# Patient Record
Sex: FEMALE | Race: WHITE | NOT HISPANIC OR LATINO | Employment: UNEMPLOYED | URBAN - NONMETROPOLITAN AREA
[De-identification: names, ages, dates, MRNs, and addresses within clinical notes are randomized per-mention and may not be internally consistent; named-entity substitution may affect disease eponyms.]

---

## 2017-01-17 ENCOUNTER — OFFICE VISIT (OUTPATIENT)
Dept: ENDOCRINOLOGY | Facility: CLINIC | Age: 47
End: 2017-01-17

## 2017-01-17 VITALS
HEART RATE: 75 BPM | WEIGHT: 200.8 LBS | BODY MASS INDEX: 35.58 KG/M2 | HEIGHT: 63 IN | SYSTOLIC BLOOD PRESSURE: 158 MMHG | DIASTOLIC BLOOD PRESSURE: 60 MMHG

## 2017-01-17 DIAGNOSIS — E10.69 MIXED DIABETIC HYPERLIPIDEMIA ASSOCIATED WITH TYPE 1 DIABETES MELLITUS (HCC): ICD-10-CM

## 2017-01-17 DIAGNOSIS — I15.2 HYPERTENSION ASSOCIATED WITH DIABETES (HCC): ICD-10-CM

## 2017-01-17 DIAGNOSIS — E10.21 DIABETIC NEPHROPATHY ASSOCIATED WITH TYPE 1 DIABETES MELLITUS (HCC): ICD-10-CM

## 2017-01-17 DIAGNOSIS — E78.2 MIXED DIABETIC HYPERLIPIDEMIA ASSOCIATED WITH TYPE 1 DIABETES MELLITUS (HCC): ICD-10-CM

## 2017-01-17 DIAGNOSIS — E11.59 HYPERTENSION ASSOCIATED WITH DIABETES (HCC): ICD-10-CM

## 2017-01-17 DIAGNOSIS — E10.649 TYPE 1 DIABETES MELLITUS WITH HYPOGLYCEMIA UNAWARENESS (HCC): Primary | ICD-10-CM

## 2017-01-17 PROBLEM — E10.3419: Status: ACTIVE | Noted: 2017-01-17

## 2017-01-17 LAB — GLUCOSE BLDC GLUCOMTR-MCNC: 160 MG/DL (ref 70–130)

## 2017-01-17 PROCEDURE — 99214 OFFICE O/P EST MOD 30 MIN: CPT | Performed by: INTERNAL MEDICINE

## 2017-01-17 PROCEDURE — 82962 GLUCOSE BLOOD TEST: CPT | Performed by: INTERNAL MEDICINE

## 2017-01-17 NOTE — MR AVS SNAPSHOT
"                        Amanda Gunderson Vito   1/17/2017 9:45 AM   Office Visit    Dept Phone:  [phone]   Encounter #:  [id number]    Provider:  Satinder Ledezma MD   Department:  [facility] ENDOCRINOLOGY                Your Full Care Plan              Today's Medication Changes          These changes are accurate as of: 1/17/17 11:14 AM.  If you have any questions, ask your nurse or doctor.               New Medication(s)Ordered:     Insulin Glargine 300 UNIT/ML solution pen-injector   Commonly known as:  TOUJEO SOLOSTAR   Inject 30 Units under the skin Daily.   Replaces:  LANTUS 100 UNIT/ML injection         Medication(s)that have changed:     lisinopril 10 MG tablet   Commonly known as:  PRINIVIL,ZESTRIL   Take 10 mg by mouth daily.   What changed:  Another medication with the same name was removed. Continue taking this medication, and follow the directions you see here.         Stop taking medication(s)listed here:     LANTUS 100 UNIT/ML injection   Generic drug:  insulin glargine   Replaced by:  Insulin Glargine 300 UNIT/ML solution pen-injector           NOVOLOG 100 UNIT/ML injection   Generic drug:  insulin aspart                Where to Get Your Medications      These medications were sent to [facility] [address] [place] [address] [employer] OUT[facility] - [phone] [facility] [phone] [facility] [address]     Phone:  [phone]     Insulin Glargine 300 UNIT/ML solution pen-injector                  Your Updated Medication List          This list is accurate as of: 1/17/17 11:14 AM.  Always use your most recent med list.                ACCU-CHEK CELSO PLUS test strip   Generic drug:  glucose blood       ACCU-CHEK MULTICLIX LANCET DEV kit   Provide lancing device and 5 lancets per day use as needed       atorvastatin 40 MG tablet   Commonly known as:  LIPITOR       * B-D INSULIN SYRINGE 1CC/27G 27G X 1/2\" 1 ML misc   Generic drug:  Insulin Syringe-Needle " "U-100       * B-D INS SYRINGE 0.5CC/31GX5/16 31G X 5/16\" 0.5 ML misc   Generic drug:  Insulin Syringe-Needle U-100       * BD INSULIN SYRINGE ULTRAFINE 31G X 15/64\" 0.5 ML misc   Generic drug:  Insulin Syringe-Needle U-100       ALONSO BREEZE 2 SYSTEM W/DEVICE kit   Provide meter and strips to check 5 times daily       furosemide 40 MG tablet   Commonly known as:  LASIX       GLUCAGON EMERGENCY 1 MG injection   Generic drug:  glucagon       HUMALOG 100 UNIT/ML injection   Generic drug:  insulin lispro       hydrALAZINE 25 MG tablet   Commonly known as:  APRESOLINE       Insulin Glargine 300 UNIT/ML solution pen-injector   Commonly known as:  TOUJEO SOLOSTAR   Inject 30 Units under the skin Daily.       lisinopril 10 MG tablet   Commonly known as:  PRINIVIL,ZESTRIL       meloxicam 7.5 MG tablet   Commonly known as:  MOBIC       POLYTRIM 37111-3.1 UNIT/ML-% ophthalmic solution   Generic drug:  trimethoprim-polymyxin b       * Notice:  This list has 3 medication(s) that are the same as other medications prescribed for you. Read the directions carefully, and ask your doctor or other care provider to review them with you.            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    -  Primary ICD-10-CM: E10.649  ICD-9-CM: 250.81     Hypertension associated with diabetes     ICD-10-CM: E11.59, I10  ICD-9-CM: 250.80, 401.9     Mixed diabetic hyperlipidemia associated with type 1 diabetes mellitus     ICD-10-CM: E10.69, E78.2  ICD-9-CM: 250.81, 272.2     Diabetic nephropathy associated with type 1 diabetes mellitus     ICD-10-CM: E10.21  ICD-9-CM: 250.41, 583.81       Instructions     None    Patient Instructions History      Upcoming " "Appointments     Visit Type Date Time Department    FOLLOW UP 1/17/2017  9:45 AM [facility] ENDOCRINOLOGY [facility]    FOLLOW UP 5/31/2017  9:00 AM [facility]CRINOLOGY [facility]      [facility] Sign[facility]     Our records indicate that you have an active [facility] [email or web address] account.    You can view your After Visit Summary by going to [employer] and logging in with your [email or web address] username and password.  If you don't have a [email or web address] username and password but a parent or guardian has access to your record, the parent or guardian should login with their own [email or web address] username and password and access your record to view the After Visit Summary.    If you have questions, you can email [email or web address] or call [phone] to talk to our [email or web address] staff.  Remember, [email or web address] is NOT to be used for urgent needs.  For medical emergencies, dial 911.               Other Info from Your Visit           Your Appointments     May 31, 2017  9:00 AM CDT   Follow Up with Satinder Ledezma MD   [facility] (--)    [address]  [facility] [place] [address] [place], [place] [id number] [phone]           Arrive 15 minutes prior to appointment.              Allergies     Altace [Ramipril]        Reason for Visit     Diabetes           Vital Signs     Blood Pressure Pulse Height Weight Body Mass Index Smoking Status    158/60 (BP Location: Right arm, Patient Position: Sitting, Cuff Size: Large Adult) 75 63\" (160 cm) 200 lb 12.8 oz (91.1 kg) 35.57 kg/m2 Never Smoker      Problems and Diagnoses Noted     Diabetes with kidney complications    High blood pressure associated with diabetes    Mixed diabetic hyperlipidemia associated with type 1 diabetes mellitus    Severe nonproliferative diabetic retinopathy with macular edema associated with type 1 diabetes mellitus    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

## 2017-01-17 NOTE — PROGRESS NOTES
Amanda Padron is a 46 y.o. female who presents for  evaluation of   Chief Complaint   Patient presents with   • Diabetes         Primary Care / Referring Provider  APOLINAR Boyd    History of Present Illness  Duration/Timing:  Diabetes mellitus type 1, Age at onset of diabetes: 11 years, Onset of symptoms sudden  Timing - constant  Qualtiy - uncontrolled  Severiy - High since there is associated DR.    Severity (Complications/Hospitalizations)  Secondary Macrovascular Complications:  No CAD, No CVA, No PAD  Secondary Microvascular Complications:  Date of last Microalbumin Assessment 06/08/2016, No proteinuria, Diabetic Retinopathy, No Diabetic Neuropathy  No microalbuminuria - Jan 2014    Context  Diabetes Regimen:  Insulin,   Lab Results   Component Value Date    HGBA1C 7.9 (H) 01/09/2017       Blood Glucose Readings  both hypo and hyperglycemia   Diet  variable carb intake  Exercise:  Does not exercise    Associated Signs/Symptoms  Hyperglycemic Symptoms:  No polyuria, No polydipsia, No polyphagia, Blurred vision  Hypoglycemic Episodes:  Documented symptomatic hypoglycemia, Hypoglycemic unawareness, Seizures and syncope related to hypoglycemia      Past Medical History   Diagnosis Date   • Acute conjunctivitis    • Brittle diabetes mellitus      not controlled due to hypoglycemia      • Diabetic retinopathy    • Dyslipidemia    • Hypertensive disorder    • Vitamin D deficiency      Family History   Problem Relation Age of Onset   • Coronary artery disease Other    • Diabetes Other    • Hypertension Other      Social History   Substance Use Topics   • Smoking status: Never Smoker   • Smokeless tobacco: Not on file   • Alcohol use Not on file         Current Outpatient Prescriptions:   •  atorvastatin (LIPITOR) 40 MG tablet, Take 60 mg by mouth daily., Disp: , Rfl:   •  Blood Glucose Monitoring Suppl (ALONSO BREEZE 2 SYSTEM) W/DEVICE kit, Provide meter and strips to check 5 times daily, Disp: 150  "each, Rfl: 11  •  glucagon (GLUCAGON EMERGENCY) 1 MG injection, Inject  under the skin. Use as directed., Disp: , Rfl:   •  glucose blood (ACCU-CHEK CELSO PLUS) test strip, 1 each by Other route 4 (four) times a day. Use as instructed, Disp: , Rfl:   •  hydrALAZINE (APRESOLINE) 25 MG tablet, Take 25 mg by mouth 3 (three) times a day., Disp: , Rfl:   •  insulin glargine (LANTUS) 100 UNIT/ML injection, Inject 30 Units under the skin every night., Disp: , Rfl:   •  insulin lispro (HUMALOG) 100 UNIT/ML injection, Inject 20 Units under the skin 3 (three) times a day before meals., Disp: , Rfl:   •  Insulin Syringe-Needle U-100 (B-D INS SYRINGE 0.5CC/31GX5/16) 31G X 5/16\" 0.5 ML misc, 5 (five) times a day., Disp: , Rfl:   •  Insulin Syringe-Needle U-100 (B-D INSULIN SYRINGE 1CC/27G) 27G X 1/2\" 1 ML misc, 4 (four) times a day., Disp: , Rfl:   •  Insulin Syringe-Needle U-100 (BD INSULIN SYRINGE ULTRAFINE) 31G X 15/64\" 0.5 ML misc, 4 (four) times a day., Disp: , Rfl:   •  Lancets Misc. (ACCU-CHEK MULTICLIX LANCET DEV) kit, Provide lancing device and 5 lancets per day use as needed, Disp: 150 each, Rfl: 11  •  lisinopril (PRINIVIL,ZESTRIL) 20 MG tablet, Take 20 mg by mouth daily., Disp: , Rfl:   •  meloxicam (MOBIC) 7.5 MG tablet, Take 7.5 mg by mouth daily. one tablet by mouth once daily for pain take with food., Disp: , Rfl:   •  trimethoprim-polymyxin b (POLYTRIM) 32381-4.1 UNIT/ML-% ophthalmic solution, 1-2 drops 4 (four) times a day. one or two gtts to the affected eye QID for 5 to 7 days., Disp: , Rfl:   •  furosemide (LASIX) 40 MG tablet, Take 20 mg by mouth Every Other Day., Disp: , Rfl:   •  insulin aspart (NOVOLOG) 100 UNIT/ML injection, Inject 15-20 Units under the skin 3 (three) times a day before meals. Plus take with snacks., Disp: , Rfl:   •  lisinopril (PRINIVIL,ZESTRIL) 10 MG tablet, Take 10 mg by mouth daily., Disp: , Rfl:     Review of Systems    Review of Systems   Constitutional: Negative for activity " "change, appetite change, chills, diaphoresis, fatigue, fever and unexpected weight change.   HENT: Negative for congestion, drooling, ear discharge, ear pain, facial swelling, mouth sores, nosebleeds, postnasal drip, sinus pressure, sneezing, sore throat, tinnitus, trouble swallowing and voice change.    Eyes: Negative.  Negative for photophobia, pain, discharge, redness and itching.   Respiratory: Negative.  Negative for apnea, cough, choking, chest tightness, shortness of breath, wheezing and stridor.    Cardiovascular: Negative.  Negative for chest pain, palpitations and leg swelling.   Gastrointestinal: Negative.  Negative for abdominal distention, abdominal pain, constipation, diarrhea, nausea and vomiting.   Endocrine: Negative.  Negative for cold intolerance, heat intolerance, polydipsia, polyphagia and polyuria.   Genitourinary: Negative for difficulty urinating, dysuria, flank pain and frequency.   Musculoskeletal: Negative for arthralgias, back pain, gait problem, joint swelling, myalgias, neck pain and neck stiffness.   Skin: Negative for color change, pallor, rash and wound.   Allergic/Immunologic: Negative for environmental allergies, food allergies and immunocompromised state.   Neurological: Negative for dizziness, tremors, seizures, syncope, facial asymmetry, speech difficulty, weakness, light-headedness, numbness and headaches.   Hematological: Negative for adenopathy. Does not bruise/bleed easily.   Psychiatric/Behavioral: Negative for agitation, behavioral problems, confusion, decreased concentration, dysphoric mood, hallucinations, self-injury, sleep disturbance and suicidal ideas. The patient is not nervous/anxious and is not hyperactive.         Objective:     Visit Vitals   • /60 (BP Location: Right arm, Patient Position: Sitting, Cuff Size: Large Adult)   • Pulse 75   • Ht 63\" (160 cm)   • Wt 200 lb 12.8 oz (91.1 kg)   • BMI 35.57 kg/m2       Physical Exam   Constitutional: She is " oriented to person, place, and time. She appears well-developed.   HENT:   Head: Normocephalic.   Right Ear: External ear normal.   Left Ear: External ear normal.   Nose: Nose normal.   Eyes: Conjunctivae and EOM are normal. No scleral icterus.   Neck: Normal range of motion. Neck supple. No tracheal deviation present. No thyromegaly present.   Cardiovascular: Normal rate, regular rhythm, normal heart sounds and intact distal pulses.  Exam reveals no gallop and no friction rub.    No murmur heard.  Pulmonary/Chest: Effort normal and breath sounds normal. No stridor. No respiratory distress. She has no wheezes. She has no rales. She exhibits no tenderness.   Abdominal: Soft. Bowel sounds are normal. She exhibits no distension and no mass. There is no tenderness. There is no rebound and no guarding.   Musculoskeletal: Normal range of motion. She exhibits no tenderness or deformity.   Lymphadenopathy:     She has no cervical adenopathy.   Neurological: She is alert and oriented to person, place, and time. She displays normal reflexes. She exhibits normal muscle tone. Coordination normal.   Skin: No rash noted. No erythema. No pallor.   Psychiatric: She has a normal mood and affect. Her behavior is normal. Judgment and thought content normal.       Lab Review    Results for orders placed or performed in visit on 01/17/17   POC Glucose Fingerstick   Result Value Ref Range    Glucose 160 (A) 70 - 130 mg/dL           Assessment/Plan       ICD-10-CM ICD-9-CM   1. Uncontrolled type 1 diabetes mellitus with stage 3 chronic kidney disease E10.22 250.43    N18.3 585.3    E10.65        Glycemic Mgmt    Lab Results   Component Value Date    HGBA1C 7.9 (H) 01/09/2017    HGBA1C 9.0 (H) 09/29/2016    HGBA1C 9.3 (H) 06/13/2016     Lab Results   Component Value Date    LDLCALC 60 01/09/2017    CREATININE 1.4 (H) 01/09/2017         No carb counting    She gives insulin based on experience and doesn't like change so she is still using  syringe and vials    Lantus 25  -- I suggested to adjust based on night to a.m. excursion  +- adjusting by 1 to 2 units    Novolog- now Humalog,  15 with meals but she adjusts between 10 and 18  It seems that she does a carb ratio of 5 and I suggested to try it and self titration explained.  I sugggested sensitivity of 40    I did give her pens for her to try but preferred syringes    She agrees to toujeo, this will improve variability accounted by kinetic insulin variability       I suggested sensor, she will think about it.                                                                                                                                                                                                                                                                                                                                                                                                                                                                                                                                                       Lipid Management    No results found for: CHOL  Lab Results   Component Value Date    TRIG 93 01/09/2017    TRIG 90 08/11/2015    TRIG 65 04/21/2014     Lab Results   Component Value Date    HDL 53 (L) 01/09/2017    HDL 62 08/11/2015    HDL 60 04/21/2014     Lab Results   Component Value Date    LDLCALC 60 01/09/2017    LDLCALC 55 08/11/2015    LDLCALC 54 04/21/2014     Lipids at goal     continue     Lipitor 40 mg tablet, 1 1/2 tabs daily   Blood Pressure Management:  Target blood pressure less than 130/80 mm/hg, On Ace Inhibitor/ARB, Control of blood pressure    Vitals:    01/17/17 1031   BP: 158/60   Pulse: 75       Now managed by Moris      Microvascular Complication Monitoring:  Microalbuminuria, Date of last Microalbumin Assessment 06/08/2016, Diabetic Retinopathy, No Diabetic Neuropathy  following with Dr. Moore in [place] has     Now following w nephrology      On lasix 20 mg daily   On lisinopril 10 mg daily     not taking NSAIDS, bp well controlled, diabetes needs to improve     no neuropathy - has plantar fasciitis   Immunizations:  Patient prefers not to receive influenza immunization, Patient prefers not to receive pneumococcal immunization  Preventive Care:  Patient is not smoking  Weight Related:  Obesity, Counseled on nutrition, Counseled on physical activity  Bone Health  Vit D Def  can't tolerate Vit D3 but can tolerate vit D2    On 50 th u every weeky      Other Diabetes Related Aspects    Lab Results   Component Value Date    TSH 0.70 01/09/2017     Lab Results   Component Value Date    FTNCNMOH50 251 01/09/2017   neg celiac panel in  2016     I reviewed and summarized records from APOLINAR Boyd from 2016 and I reviewed / ordered labs.     Orders Placed This Encounter   Procedures   • POC Glucose Fingerstick         A copy of my note was sent to APOLINAR Boyd    Please see my above opinion and suggestions.

## 2017-04-06 ENCOUNTER — TRANSCRIBE ORDERS (OUTPATIENT)
Dept: LAB | Facility: HOSPITAL | Age: 47
End: 2017-04-06

## 2017-04-06 DIAGNOSIS — I10 ESSENTIAL (PRIMARY) HYPERTENSION: Primary | ICD-10-CM

## 2017-04-06 DIAGNOSIS — N17.9 ACUTE KIDNEY FAILURE, UNSPECIFIED (HCC): ICD-10-CM

## 2017-04-06 DIAGNOSIS — R80.9 PROTEINURIA: ICD-10-CM

## 2017-04-06 DIAGNOSIS — N18.30 CHRONIC RENAL DISEASE, STAGE III (HCC): ICD-10-CM

## 2017-04-06 DIAGNOSIS — E55.9 UNSPECIFIED VITAMIN D DEFICIENCY: ICD-10-CM

## 2017-04-28 ENCOUNTER — TELEPHONE (OUTPATIENT)
Dept: ENDOCRINOLOGY | Facility: CLINIC | Age: 47
End: 2017-04-28

## 2017-05-24 ENCOUNTER — TRANSCRIBE ORDERS (OUTPATIENT)
Dept: LAB | Facility: HOSPITAL | Age: 47
End: 2017-05-24

## 2017-05-25 ENCOUNTER — APPOINTMENT (OUTPATIENT)
Dept: LAB | Facility: HOSPITAL | Age: 47
End: 2017-05-25

## 2017-05-25 LAB
25(OH)D3 SERPL-MCNC: 46 NG/ML (ref 30–100)
ALBUMIN SERPL-MCNC: 4.7 G/DL (ref 3.4–4.8)
ALBUMIN UR-MCNC: 1.8 MG/L
ALBUMIN/GLOB SERPL: 1.5 G/DL (ref 1.1–1.8)
ALP SERPL-CCNC: 116 U/L (ref 38–126)
ALT SERPL W P-5'-P-CCNC: 52 U/L (ref 9–52)
ANION GAP SERPL CALCULATED.3IONS-SCNC: 14 MMOL/L (ref 5–15)
ARTICHOKE IGE QN: 60 MG/DL (ref 1–129)
AST SERPL-CCNC: 48 U/L (ref 14–36)
BASOPHILS # BLD AUTO: 0.01 10*3/MM3 (ref 0–0.2)
BASOPHILS NFR BLD AUTO: 0.2 % (ref 0–2)
BILIRUB SERPL-MCNC: 0.4 MG/DL (ref 0.2–1.3)
BUN BLD-MCNC: 19 MG/DL (ref 7–21)
BUN/CREAT SERPL: 16.5 (ref 7–25)
CALCIUM SPEC-SCNC: 9.2 MG/DL (ref 8.4–10.2)
CHLORIDE SERPL-SCNC: 100 MMOL/L (ref 95–110)
CHOLEST SERPL-MCNC: 140 MG/DL (ref 0–199)
CO2 SERPL-SCNC: 23 MMOL/L (ref 22–31)
CREAT BLD-MCNC: 1.15 MG/DL (ref 0.5–1)
CREAT UR-MCNC: 20.8 MG/DL
DEPRECATED RDW RBC AUTO: 46 FL (ref 36.4–46.3)
EOSINOPHIL # BLD AUTO: 0.22 10*3/MM3 (ref 0–0.7)
EOSINOPHIL NFR BLD AUTO: 3.8 % (ref 0–7)
ERYTHROCYTE [DISTWIDTH] IN BLOOD BY AUTOMATED COUNT: 14 % (ref 11.5–14.5)
GFR SERPL CREATININE-BSD FRML MDRD: 50 ML/MIN/1.73 (ref 58–135)
GLOBULIN UR ELPH-MCNC: 3.1 GM/DL (ref 2.3–3.5)
GLUCOSE BLD-MCNC: 215 MG/DL (ref 60–100)
HBA1C MFR BLD: 7.99 % (ref 4–5.6)
HCT VFR BLD AUTO: 35.6 % (ref 35–45)
HDLC SERPL-MCNC: 64 MG/DL (ref 60–200)
HGB BLD-MCNC: 11.5 G/DL (ref 12–15.5)
IMM GRANULOCYTES # BLD: 0.01 10*3/MM3 (ref 0–0.02)
IMM GRANULOCYTES NFR BLD: 0.2 % (ref 0–0.5)
LDLC/HDLC SERPL: 0.98 {RATIO} (ref 0–3.22)
LYMPHOCYTES # BLD AUTO: 1.86 10*3/MM3 (ref 0.6–4.2)
LYMPHOCYTES NFR BLD AUTO: 32.2 % (ref 10–50)
MCH RBC QN AUTO: 28.7 PG (ref 26.5–34)
MCHC RBC AUTO-ENTMCNC: 32.3 G/DL (ref 31.4–36)
MCV RBC AUTO: 88.8 FL (ref 80–98)
MICROALBUMIN/CREAT UR: 86.5 MG/G (ref 0–30)
MONOCYTES # BLD AUTO: 0.4 10*3/MM3 (ref 0–0.9)
MONOCYTES NFR BLD AUTO: 6.9 % (ref 0–12)
NEUTROPHILS # BLD AUTO: 3.28 10*3/MM3 (ref 2–8.6)
NEUTROPHILS NFR BLD AUTO: 56.7 % (ref 37–80)
PLATELET # BLD AUTO: 209 10*3/MM3 (ref 150–450)
PMV BLD AUTO: 13 FL (ref 8–12)
POTASSIUM BLD-SCNC: 4.3 MMOL/L (ref 3.5–5.1)
PROT SERPL-MCNC: 7.8 G/DL (ref 6.3–8.6)
RBC # BLD AUTO: 4.01 10*6/MM3 (ref 3.77–5.16)
SODIUM BLD-SCNC: 137 MMOL/L (ref 137–145)
TRIGL SERPL-MCNC: 66 MG/DL (ref 20–199)
TSH SERPL DL<=0.05 MIU/L-ACNC: 0.82 MIU/ML (ref 0.46–4.68)
VIT B12 BLD-MCNC: 906 PG/ML (ref 239–931)
WBC NRBC COR # BLD: 5.78 10*3/MM3 (ref 3.2–9.8)

## 2017-05-25 PROCEDURE — 85025 COMPLETE CBC W/AUTO DIFF WBC: CPT | Performed by: INTERNAL MEDICINE

## 2017-05-25 PROCEDURE — 82570 ASSAY OF URINE CREATININE: CPT | Performed by: INTERNAL MEDICINE

## 2017-05-25 PROCEDURE — 36415 COLL VENOUS BLD VENIPUNCTURE: CPT | Performed by: INTERNAL MEDICINE

## 2017-05-25 PROCEDURE — 82043 UR ALBUMIN QUANTITATIVE: CPT | Performed by: INTERNAL MEDICINE

## 2017-05-25 PROCEDURE — 80061 LIPID PANEL: CPT | Performed by: INTERNAL MEDICINE

## 2017-05-25 PROCEDURE — 80053 COMPREHEN METABOLIC PANEL: CPT | Performed by: INTERNAL MEDICINE

## 2017-05-25 PROCEDURE — 83036 HEMOGLOBIN GLYCOSYLATED A1C: CPT | Performed by: INTERNAL MEDICINE

## 2017-05-25 PROCEDURE — 82607 VITAMIN B-12: CPT | Performed by: INTERNAL MEDICINE

## 2017-05-25 PROCEDURE — 84156 ASSAY OF PROTEIN URINE: CPT | Performed by: INTERNAL MEDICINE

## 2017-05-25 PROCEDURE — 82306 VITAMIN D 25 HYDROXY: CPT | Performed by: INTERNAL MEDICINE

## 2017-05-25 PROCEDURE — 84443 ASSAY THYROID STIM HORMONE: CPT | Performed by: INTERNAL MEDICINE

## 2017-06-19 ENCOUNTER — TRANSCRIBE ORDERS (OUTPATIENT)
Dept: LAB | Facility: HOSPITAL | Age: 47
End: 2017-06-19

## 2017-06-19 DIAGNOSIS — R80.9 PROTEINURIA: ICD-10-CM

## 2017-06-19 DIAGNOSIS — I10 BENIGN HYPERTENSION: Primary | ICD-10-CM

## 2017-06-19 DIAGNOSIS — E55.9 UNSPECIFIED VITAMIN D DEFICIENCY: ICD-10-CM

## 2017-06-19 DIAGNOSIS — N18.30 CHRONIC KIDNEY DISEASE, STAGE III (MODERATE) (HCC): ICD-10-CM

## 2017-06-19 DIAGNOSIS — N17.9 ACUTE KIDNEY FAILURE, UNSPECIFIED (HCC): ICD-10-CM

## 2017-06-26 ENCOUNTER — OFFICE VISIT (OUTPATIENT)
Dept: ENDOCRINOLOGY | Facility: CLINIC | Age: 47
End: 2017-06-26

## 2017-06-26 VITALS
HEIGHT: 63 IN | WEIGHT: 194.3 LBS | DIASTOLIC BLOOD PRESSURE: 68 MMHG | SYSTOLIC BLOOD PRESSURE: 148 MMHG | BODY MASS INDEX: 34.43 KG/M2 | HEART RATE: 76 BPM

## 2017-06-26 DIAGNOSIS — E10.649 TYPE 1 DIABETES MELLITUS WITH HYPOGLYCEMIA UNAWARENESS (HCC): Primary | ICD-10-CM

## 2017-06-26 DIAGNOSIS — E78.2 MIXED DIABETIC HYPERLIPIDEMIA ASSOCIATED WITH TYPE 1 DIABETES MELLITUS (HCC): ICD-10-CM

## 2017-06-26 DIAGNOSIS — I15.2 HYPERTENSION ASSOCIATED WITH DIABETES (HCC): ICD-10-CM

## 2017-06-26 DIAGNOSIS — E10.3419: ICD-10-CM

## 2017-06-26 DIAGNOSIS — E10.69 MIXED DIABETIC HYPERLIPIDEMIA ASSOCIATED WITH TYPE 1 DIABETES MELLITUS (HCC): ICD-10-CM

## 2017-06-26 DIAGNOSIS — E11.59 HYPERTENSION ASSOCIATED WITH DIABETES (HCC): ICD-10-CM

## 2017-06-26 LAB — GLUCOSE BLDC GLUCOMTR-MCNC: 133 MG/DL (ref 70–130)

## 2017-06-26 PROCEDURE — 82962 GLUCOSE BLOOD TEST: CPT | Performed by: INTERNAL MEDICINE

## 2017-06-26 PROCEDURE — 99214 OFFICE O/P EST MOD 30 MIN: CPT | Performed by: INTERNAL MEDICINE

## 2017-06-26 NOTE — PROGRESS NOTES
Amanda Padron is a 46 y.o. female who presents for  evaluation of   Chief Complaint   Patient presents with   • Diabetes         Primary Care / Referring Provider  APOLINAR Boyd    History of Present Illness  Duration/Timing:  Diabetes mellitus type 1, Age at onset of diabetes: 11 years, Onset of symptoms sudden  Timing - constant  Qualtiy - uncontrolled, improved   Severiy - High since there is associated DR.    Severity (Complications/Hospitalizations)  Secondary Macrovascular Complications:  No CAD, No CVA, No PAD  Secondary Microvascular Complications:  Diabetic Nephropathy , Diabetic Retinopathy, No Diabetic Neuropathy      Context  Diabetes Regimen:  Insulin,   Lab Results   Component Value Date    HGBA1C 7.99 (H) 05/25/2017       Blood Glucose Readings  both hypo and hyperglycemia but minimized   Diet  variable carb intake  Exercise:  Does not exercise    Associated Signs/Symptoms  Hyperglycemic Symptoms:  No polyuria, No polydipsia, No polyphagia, Blurred vision  Hypoglycemic Episodes:  Documented symptomatic hypoglycemia, Hypoglycemic unawareness, Seizures and syncope related to hypoglycemia      Past Medical History:   Diagnosis Date   • Acute conjunctivitis    • 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      • Diabetic retinopathy    • Dyslipidemia    • Hypertensive disorder    • Vitamin D deficiency      Family History   Problem Relation Age of Onset   • Coronary artery disease Other    • Diabetes Other    • Hypertension Other      Social History   Substance Use Topics   • Smoking status: Never Smoker   • Smokeless tobacco: Never Used   • Alcohol use Not on file         Current Outpatient Prescriptions:   •  atorvastatin (LIPITOR) 40 MG tablet, Take 60 mg by mouth daily., Disp: , Rfl:   •  Blood Glucose Monitoring Suppl (Simulmedia BREEZE 2 SYSTEM) W/DEVICE kit, Provide meter and strips to check 5 times daily, Disp: 150 each, Rfl: 11  •  furosemide (LASIX) 40 MG  "tablet, Take 20 mg by mouth Every Other Day., Disp: , Rfl:   •  glucagon (GLUCAGON EMERGENCY) 1 MG injection, Inject  under the skin. Use as directed., Disp: , Rfl:   •  glucose blood (ACCU-CHEK CELSO PLUS) test strip, 1 each by Other route 4 (four) times a day. Use as instructed, Disp: , Rfl:   •  Insulin Glargine (TOUJEO SOLOSTAR) 300 UNIT/ML solution pen-injector, Inject 30 Units under the skin Daily., Disp: 1 pen, Rfl: 11  •  insulin lispro (HUMALOG) 100 UNIT/ML injection, Inject 20 Units under the skin 3 (three) times a day before meals., Disp: , Rfl:   •  Insulin Syringe-Needle U-100 (B-D INS SYRINGE 0.5CC/31GX5/16) 31G X 5/16\" 0.5 ML misc, 5 (five) times a day., Disp: , Rfl:   •  Insulin Syringe-Needle U-100 (B-D INSULIN SYRINGE 1CC/27G) 27G X 1/2\" 1 ML misc, 4 (four) times a day., Disp: , Rfl:   •  Insulin Syringe-Needle U-100 (BD INSULIN SYRINGE ULTRAFINE) 31G X 15/64\" 0.5 ML misc, 4 (four) times a day., Disp: , Rfl:   •  Lancets Misc. (ACCU-CHEK MULTICLIX LANCET DEV) kit, Provide lancing device and 5 lancets per day use as needed, Disp: 150 each, Rfl: 11  •  trimethoprim-polymyxin b (POLYTRIM) 60992-0.1 UNIT/ML-% ophthalmic solution, 1-2 drops 4 (four) times a day. one or two gtts to the affected eye QID for 5 to 7 days., Disp: , Rfl:   •  hydrALAZINE (APRESOLINE) 25 MG tablet, Take 25 mg by mouth 3 (three) times a day., Disp: , Rfl:   •  lisinopril (PRINIVIL,ZESTRIL) 10 MG tablet, Take 10 mg by mouth daily., Disp: , Rfl:   •  meloxicam (MOBIC) 7.5 MG tablet, Take 7.5 mg by mouth daily. one tablet by mouth once daily for pain take with food., Disp: , Rfl:     Review of Systems    Review of Systems   Constitutional: Negative for activity change, appetite change, chills, diaphoresis, fatigue, fever and unexpected weight change.   HENT: Negative for congestion, drooling, ear discharge, ear pain, facial swelling, mouth sores, nosebleeds, postnasal drip, sinus pressure, sneezing, sore throat, tinnitus, trouble " "swallowing and voice change.    Eyes: Negative.  Negative for photophobia, pain, discharge, redness and itching.   Respiratory: Negative.  Negative for apnea, cough, choking, chest tightness, shortness of breath, wheezing and stridor.    Cardiovascular: Negative.  Negative for chest pain, palpitations and leg swelling.   Gastrointestinal: Negative.  Negative for abdominal distention, abdominal pain, constipation, diarrhea, nausea and vomiting.   Endocrine: Negative.  Negative for cold intolerance, heat intolerance, polydipsia, polyphagia and polyuria.   Genitourinary: Negative for difficulty urinating, dysuria, flank pain and frequency.   Musculoskeletal: Negative for arthralgias, back pain, gait problem, joint swelling, myalgias, neck pain and neck stiffness.   Skin: Negative for color change, pallor, rash and wound.   Allergic/Immunologic: Negative for environmental allergies, food allergies and immunocompromised state.   Neurological: Negative for dizziness, tremors, seizures, syncope, facial asymmetry, speech difficulty, weakness, light-headedness, numbness and headaches.   Hematological: Negative for adenopathy. Does not bruise/bleed easily.   Psychiatric/Behavioral: Negative for agitation, behavioral problems, confusion, decreased concentration, dysphoric mood, hallucinations, self-injury, sleep disturbance and suicidal ideas. The patient is not nervous/anxious and is not hyperactive.         Objective:     /68 (BP Location: Right arm, Patient Position: Sitting, Cuff Size: Adult)  Pulse 76  Ht 63\" (160 cm)  Wt 194 lb 4.8 oz (88.1 kg)  BMI 34.42 kg/m2    Physical Exam   Constitutional: She is oriented to person, place, and time. She appears well-developed.   HENT:   Head: Normocephalic.   Right Ear: External ear normal.   Left Ear: External ear normal.   Nose: Nose normal.   Eyes: Conjunctivae and EOM are normal. No scleral icterus.   Neck: Normal range of motion. Neck supple. No tracheal deviation " present. No thyromegaly present.   Cardiovascular: Normal rate, regular rhythm, normal heart sounds and intact distal pulses.  Exam reveals no gallop and no friction rub.    No murmur heard.  Pulmonary/Chest: Effort normal and breath sounds normal. No stridor. No respiratory distress. She has no wheezes. She has no rales. She exhibits no tenderness.   Abdominal: Soft. Bowel sounds are normal. She exhibits no distension and no mass. There is no tenderness. There is no rebound and no guarding.   Musculoskeletal: Normal range of motion. She exhibits no tenderness or deformity.   Lymphadenopathy:     She has no cervical adenopathy.   Neurological: She is alert and oriented to person, place, and time. She displays normal reflexes. She exhibits normal muscle tone. Coordination normal.   Skin: No rash noted. No erythema. No pallor.   Psychiatric: She has a normal mood and affect. Her behavior is normal. Judgment and thought content normal.       Lab Review    Results for orders placed or performed in visit on 06/26/17   POC Glucose Fingerstick   Result Value Ref Range    Glucose 133 (A) 70 - 130 mg/dL           Assessment/Plan       ICD-10-CM ICD-9-CM   1. 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 Hypertension associated with diabetes E11.59 250.80    I10 401.9   3. Mixed diabetic hyperlipidemia associated with type 1 diabetes mellitus E10.69 250.81    E78.2 272.2   4. Severe nonproliferative diabetic retinopathy with macular edema associated with type 1 diabetes mellitus E10.3419 250.51     362.07     362.06       Glycemic Mgmt    Lab Results   Component Value Date    HGBA1C 7.99 (H) 05/25/2017    HGBA1C 7.9 (H) 01/09/2017    HGBA1C 9.0 (H) 09/29/2016     Lab Results   Component Value Date    MICROALBUR 1.8 05/25/2017    LDLCALC 60 01/09/2017    CREATININE 1.15 (H) 05/25/2017         No carb counting    She gives insulin based on experience and doesn't like change so she is still using  syringe and vials    Toujeo , 25 units at night     Novolog- now Humalog,  15 with meals but she adjusts between 10 and 18  It seems that she does a carb ratio of 5 and I suggested to try it and self titration explained.  I sugggested sensitivity of 40    I did give her pens for her to try but preferred syringes    I suggested sensor, she will think about it.                                                                                                                                                                                                                                                                                                                                                                                                                                                                                                                                                       Lipid Management    Lab Results   Component Value Date    CHOL 140 05/25/2017     Lab Results   Component Value Date    TRIG 66 05/25/2017    TRIG 93 01/09/2017    TRIG 90 08/11/2015     Lab Results   Component Value Date    HDL 64 05/25/2017    HDL 53 (L) 01/09/2017    HDL 62 08/11/2015     Lab Results   Component Value Date    LDLCALC 60 01/09/2017    LDLCALC 55 08/11/2015    LDLCALC 54 04/21/2014     Lab Results   Component Value Date    LDLDIRECT 60 05/25/2017         Lipids at goal     continue     Lipitor 40 mg tablet, 1 1/2 tabs daily   Blood Pressure Management:  Target blood pressure less than 130/80 mm/hg, On Ace Inhibitor/ARB, Control of blood pressure    Vitals:    06/26/17 1458   BP: 148/68   Pulse: 76       Now managed by Moris      Microvascular Complication Monitoring:   No Diabetic Neuropathy  following with Dr. Moore in [facility]     Now following w nephrology     On lasix 20 mg daily   On lisinopril 10 mg daily     not taking NSAIDS, bp well controlled, diabetes needs to improve     no neuropathy - has plantar  fasciitis   Immunizations:  Patient prefers not to receive influenza immunization, Patient prefers not to receive pneumococcal immunization  Preventive Care:  Patient is not smoking  Weight Related:  Obesity, Counseled on nutrition, Counseled on physical activity  Bone Health  Vit D Def  can't tolerate Vit D3 but can tolerate vit D2    On 50 th u every weeky      Other Diabetes Related Aspects    Lab Results   Component Value Date    TSH 0.820 05/25/2017     Lab Results   Component Value Date    YMLQWJHG96 906 05/25/2017   neg celiac panel in  2016     I reviewed and summarized records from APOLINAR Boyd from 2016 and I reviewed / ordered labs.     Orders Placed This Encounter   Procedures   • POC Glucose Fingerstick         A copy of my note was sent to APOLINAR Boyd    Please see my above opinion and suggestions.

## 2018-02-21 ENCOUNTER — TRANSCRIBE ORDERS (OUTPATIENT)
Dept: LAB | Facility: HOSPITAL | Age: 48
End: 2018-02-21

## 2018-02-21 DIAGNOSIS — E55.9 VITAMIN D DEFICIENCY: ICD-10-CM

## 2018-02-21 DIAGNOSIS — N18.30 CHRONIC RENAL DISEASE, STAGE III (HCC): ICD-10-CM

## 2018-02-21 DIAGNOSIS — R80.9 PROTEINURIA, UNSPECIFIED TYPE: ICD-10-CM

## 2018-02-21 DIAGNOSIS — I10 ESSENTIAL HYPERTENSION: Primary | ICD-10-CM

## 2018-02-21 DIAGNOSIS — N17.9 ACUTE RENAL FAILURE, UNSPECIFIED ACUTE RENAL FAILURE TYPE (HCC): ICD-10-CM

## 2018-02-23 ENCOUNTER — TRANSCRIBE ORDERS (OUTPATIENT)
Dept: LAB | Facility: HOSPITAL | Age: 48
End: 2018-02-23

## 2018-02-23 DIAGNOSIS — Z00.00 ANNUAL PHYSICAL EXAM: Primary | ICD-10-CM

## 2018-03-05 ENCOUNTER — TELEPHONE (OUTPATIENT)
Dept: ENDOCRINOLOGY | Facility: CLINIC | Age: 48
End: 2018-03-05

## 2018-03-09 ENCOUNTER — LAB (OUTPATIENT)
Dept: LAB | Facility: HOSPITAL | Age: 48
End: 2018-03-09

## 2018-03-09 DIAGNOSIS — Z00.00 ANNUAL PHYSICAL EXAM: ICD-10-CM

## 2018-03-09 DIAGNOSIS — R80.9 PROTEINURIA, UNSPECIFIED TYPE: ICD-10-CM

## 2018-03-09 DIAGNOSIS — N18.30 CHRONIC RENAL DISEASE, STAGE III (HCC): ICD-10-CM

## 2018-03-09 DIAGNOSIS — I10 ESSENTIAL HYPERTENSION: ICD-10-CM

## 2018-03-09 DIAGNOSIS — E55.9 VITAMIN D DEFICIENCY: ICD-10-CM

## 2018-03-09 DIAGNOSIS — N17.9 ACUTE RENAL FAILURE, UNSPECIFIED ACUTE RENAL FAILURE TYPE (HCC): ICD-10-CM

## 2018-03-09 LAB
25(OH)D3 SERPL-MCNC: 25.6 NG/ML (ref 30–100)
ALBUMIN SERPL-MCNC: 4.7 G/DL (ref 3.4–4.8)
ALBUMIN UR-MCNC: 1.9 MG/L
ALBUMIN/GLOB SERPL: 1.3 G/DL (ref 1.1–1.8)
ALP SERPL-CCNC: 137 U/L (ref 38–126)
ALT SERPL W P-5'-P-CCNC: 33 U/L (ref 9–52)
ANION GAP SERPL CALCULATED.3IONS-SCNC: 18 MMOL/L (ref 5–15)
ARTICHOKE IGE QN: 72 MG/DL (ref 1–129)
AST SERPL-CCNC: 35 U/L (ref 14–36)
BASOPHILS # BLD AUTO: 0.02 10*3/MM3 (ref 0–0.2)
BASOPHILS NFR BLD AUTO: 0.3 % (ref 0–2)
BILIRUB SERPL-MCNC: 0.3 MG/DL (ref 0.2–1.3)
BUN BLD-MCNC: 28 MG/DL (ref 7–21)
BUN/CREAT SERPL: 24.1 (ref 7–25)
CALCIUM SPEC-SCNC: 9.9 MG/DL (ref 8.4–10.2)
CHLORIDE SERPL-SCNC: 96 MMOL/L (ref 95–110)
CHOLEST SERPL-MCNC: 171 MG/DL (ref 0–199)
CO2 SERPL-SCNC: 24 MMOL/L (ref 22–31)
CREAT BLD-MCNC: 1.16 MG/DL (ref 0.5–1)
CREAT UR-MCNC: 19 MG/DL
CREAT UR-MCNC: 19 MG/DL
DEPRECATED RDW RBC AUTO: 44.4 FL (ref 36.4–46.3)
EOSINOPHIL # BLD AUTO: 0.28 10*3/MM3 (ref 0–0.7)
EOSINOPHIL NFR BLD AUTO: 4.1 % (ref 0–7)
ERYTHROCYTE [DISTWIDTH] IN BLOOD BY AUTOMATED COUNT: 13.5 % (ref 11.5–14.5)
GFR SERPL CREATININE-BSD FRML MDRD: 50 ML/MIN/1.73 (ref 60–135)
GLOBULIN UR ELPH-MCNC: 3.6 GM/DL (ref 2.3–3.5)
GLUCOSE BLD-MCNC: 273 MG/DL (ref 60–100)
HBA1C MFR BLD: 7.9 % (ref 4–5.6)
HCT VFR BLD AUTO: 38.9 % (ref 35–45)
HDLC SERPL-MCNC: 59 MG/DL (ref 60–200)
HGB BLD-MCNC: 12.9 G/DL (ref 12–15.5)
IMM GRANULOCYTES # BLD: 0.01 10*3/MM3 (ref 0–0.02)
IMM GRANULOCYTES NFR BLD: 0.1 % (ref 0–0.5)
LDLC/HDLC SERPL: 1.52 {RATIO} (ref 0–3.22)
LYMPHOCYTES # BLD AUTO: 1.82 10*3/MM3 (ref 0.6–4.2)
LYMPHOCYTES NFR BLD AUTO: 26.5 % (ref 10–50)
MCH RBC QN AUTO: 29.4 PG (ref 26.5–34)
MCHC RBC AUTO-ENTMCNC: 33.2 G/DL (ref 31.4–36)
MCV RBC AUTO: 88.6 FL (ref 80–98)
MICROALBUMIN/CREAT UR: 100 MG/G (ref 0–30)
MONOCYTES # BLD AUTO: 0.41 10*3/MM3 (ref 0–0.9)
MONOCYTES NFR BLD AUTO: 6 % (ref 0–12)
NEUTROPHILS # BLD AUTO: 4.32 10*3/MM3 (ref 2–8.6)
NEUTROPHILS NFR BLD AUTO: 63 % (ref 37–80)
NRBC BLD MANUAL-RTO: 0 /100 WBC (ref 0–0)
PHOSPHATE SERPL-MCNC: 4.3 MG/DL (ref 2.4–4.4)
PLATELET # BLD AUTO: 239 10*3/MM3 (ref 150–450)
PMV BLD AUTO: 12.2 FL (ref 8–12)
POTASSIUM BLD-SCNC: 4.1 MMOL/L (ref 3.5–5.1)
PROT SERPL-MCNC: 8.3 G/DL (ref 6.3–8.6)
PROT UR-MCNC: 15.1 MG/DL
PROT/CREAT UR: 794.7 MG/G CREA (ref 0–200)
PTH-INTACT SERPL-MCNC: 78.6 PG/ML (ref 10–65)
RBC # BLD AUTO: 4.39 10*6/MM3 (ref 3.77–5.16)
SODIUM BLD-SCNC: 138 MMOL/L (ref 137–145)
TRIGL SERPL-MCNC: 112 MG/DL (ref 20–199)
TSH SERPL DL<=0.05 MIU/L-ACNC: 0.77 MIU/ML (ref 0.46–4.68)
VIT B12 BLD-MCNC: 917 PG/ML (ref 239–931)
WBC NRBC COR # BLD: 6.86 10*3/MM3 (ref 3.2–9.8)

## 2018-03-09 PROCEDURE — 83036 HEMOGLOBIN GLYCOSYLATED A1C: CPT | Performed by: INTERNAL MEDICINE

## 2018-03-09 PROCEDURE — 84156 ASSAY OF PROTEIN URINE: CPT | Performed by: INTERNAL MEDICINE

## 2018-03-09 PROCEDURE — 85025 COMPLETE CBC W/AUTO DIFF WBC: CPT

## 2018-03-09 PROCEDURE — 82607 VITAMIN B-12: CPT | Performed by: INTERNAL MEDICINE

## 2018-03-09 PROCEDURE — 82043 UR ALBUMIN QUANTITATIVE: CPT | Performed by: INTERNAL MEDICINE

## 2018-03-09 PROCEDURE — 82570 ASSAY OF URINE CREATININE: CPT | Performed by: INTERNAL MEDICINE

## 2018-03-09 PROCEDURE — 80061 LIPID PANEL: CPT | Performed by: INTERNAL MEDICINE

## 2018-03-09 PROCEDURE — 84100 ASSAY OF PHOSPHORUS: CPT

## 2018-03-09 PROCEDURE — 80053 COMPREHEN METABOLIC PANEL: CPT | Performed by: INTERNAL MEDICINE

## 2018-03-09 PROCEDURE — 82306 VITAMIN D 25 HYDROXY: CPT | Performed by: INTERNAL MEDICINE

## 2018-03-09 PROCEDURE — 83970 ASSAY OF PARATHORMONE: CPT

## 2018-03-09 PROCEDURE — 84443 ASSAY THYROID STIM HORMONE: CPT | Performed by: INTERNAL MEDICINE

## 2018-03-15 ENCOUNTER — OFFICE VISIT (OUTPATIENT)
Dept: ENDOCRINOLOGY | Facility: CLINIC | Age: 48
End: 2018-03-15

## 2018-03-15 VITALS
WEIGHT: 187.3 LBS | OXYGEN SATURATION: 94 % | HEART RATE: 100 BPM | BODY MASS INDEX: 33.19 KG/M2 | HEIGHT: 63 IN | SYSTOLIC BLOOD PRESSURE: 142 MMHG | DIASTOLIC BLOOD PRESSURE: 90 MMHG

## 2018-03-15 DIAGNOSIS — E10.69 MIXED DIABETIC HYPERLIPIDEMIA ASSOCIATED WITH TYPE 1 DIABETES MELLITUS (HCC): ICD-10-CM

## 2018-03-15 DIAGNOSIS — I15.2 HYPERTENSION ASSOCIATED WITH DIABETES (HCC): ICD-10-CM

## 2018-03-15 DIAGNOSIS — E55.9 VITAMIN D DEFICIENCY: ICD-10-CM

## 2018-03-15 DIAGNOSIS — E10.649 TYPE 1 DIABETES MELLITUS WITH HYPOGLYCEMIA UNAWARENESS (HCC): Primary | ICD-10-CM

## 2018-03-15 DIAGNOSIS — E11.59 HYPERTENSION ASSOCIATED WITH DIABETES (HCC): ICD-10-CM

## 2018-03-15 DIAGNOSIS — E78.2 MIXED DIABETIC HYPERLIPIDEMIA ASSOCIATED WITH TYPE 1 DIABETES MELLITUS (HCC): ICD-10-CM

## 2018-03-15 DIAGNOSIS — I38 DIASTOLIC MURMUR: ICD-10-CM

## 2018-03-15 PROCEDURE — 82962 GLUCOSE BLOOD TEST: CPT | Performed by: INTERNAL MEDICINE

## 2018-03-15 PROCEDURE — 99214 OFFICE O/P EST MOD 30 MIN: CPT | Performed by: INTERNAL MEDICINE

## 2018-03-15 RX ORDER — GABAPENTIN 100 MG/1
100 CAPSULE ORAL NIGHTLY
COMMUNITY
End: 2022-08-10

## 2018-03-15 RX ORDER — RANITIDINE 150 MG/1
150 TABLET ORAL 2 TIMES DAILY
COMMUNITY
End: 2022-05-11

## 2018-03-15 RX ORDER — ALPRAZOLAM 0.5 MG/1
0.5 TABLET ORAL 2 TIMES DAILY PRN
COMMUNITY
End: 2021-10-05

## 2018-03-15 RX ORDER — BUPROPION HYDROCHLORIDE 100 MG/1
100 TABLET ORAL 2 TIMES DAILY
COMMUNITY

## 2018-03-15 RX ORDER — SENNOSIDES 8.6 MG
650 CAPSULE ORAL EVERY 8 HOURS PRN
COMMUNITY

## 2018-03-15 NOTE — PROGRESS NOTES
Amanda Padron is a 47 y.o. female who presents for  evaluation of   Chief Complaint   Patient presents with   • Diabetes     BS/287         Primary Care / Referring Provider  APOLINAR Boyd    History of Present Illness  Duration/Timing:  Diabetes mellitus type 1, Age at onset of diabetes: 11 years, Onset of symptoms sudden  Timing - constant  Qualtiy - uncontrolled, improved   Severiy - High since there is associated DR.    Severity (Complications/Hospitalizations)  Secondary Macrovascular Complications:  No CAD, No CVA, No PAD  Secondary Microvascular Complications:  Diabetic Nephropathy , Diabetic Retinopathy, No Diabetic Neuropathy      Context  Diabetes Regimen:  Insulin,   Lab Results   Component Value Date    HGBA1C 7.9 (H) 03/09/2018       Blood Glucose Readings  both hypo and hyperglycemia but minimized   Diet  variable carb intake  Exercise:  Does not exercise    Associated Signs/Symptoms  Hyperglycemic Symptoms:  No polyuria, No polydipsia, No polyphagia, Blurred vision  Hypoglycemic Episodes:  Documented symptomatic hypoglycemia, Hypoglycemic unawareness, Seizures and syncope related to hypoglycemia      Past Medical History:   Diagnosis Date   • Acute conjunctivitis    • 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      • Diabetic retinopathy    • Dyslipidemia    • Hypertensive disorder    • Vitamin D deficiency      Family History   Problem Relation Age of Onset   • Coronary artery disease Other    • Diabetes Other    • Hypertension Other      Social History   Substance Use Topics   • Smoking status: Never Smoker   • Smokeless tobacco: Never Used   • Alcohol use Not on file         Current Outpatient Prescriptions:   •  acetaminophen (TYLENOL) 650 MG 8 hr tablet, Take 650 mg by mouth Every 8 (Eight) Hours As Needed for Mild Pain ., Disp: , Rfl:   •  ALPRAZolam (XANAX) 0.5 MG tablet, Take 0.5 mg by mouth 2 (Two) Times a Day As Needed for Anxiety., Disp: , Rfl:   •   "atorvastatin (LIPITOR) 40 MG tablet, Take 60 mg by mouth daily., Disp: , Rfl:   •  BD INSULIN SYRINGE ULTRAFINE 31G X 15/64\" 0.5 ML misc, INJECT INSULIN 5 TIMES DAILY AS DIRECTED, Disp: 200 each, Rfl: 11  •  buPROPion (WELLBUTRIN) 100 MG tablet, Take 100 mg by mouth 2 (Two) Times a Day., Disp: , Rfl:   •  furosemide (LASIX) 40 MG tablet, Take 20 mg by mouth Every Other Day., Disp: , Rfl:   •  gabapentin (NEURONTIN) 100 MG capsule, Take 100 mg by mouth Daily., Disp: , Rfl:   •  glucagon (GLUCAGON EMERGENCY) 1 MG injection, Inject  under the skin. Use as directed., Disp: , Rfl:   •  glucose blood (ACCU-CHEK CELSO PLUS) test strip, 1 each by Other route 4 (four) times a day. Use as instructed, Disp: , Rfl:   •  HUMALOG 100 UNIT/ML injection, INJECT 20 UNITS 3 TIMES DAILY BEFORE EVERY MEAL, Disp: 5 each, Rfl: 5  •  hydrALAZINE (APRESOLINE) 25 MG tablet, Take 50 mg by mouth 3 (Three) Times a Day., Disp: , Rfl:   •  Insulin Glargine (TOUJEO SOLOSTAR) 300 UNIT/ML solution pen-injector, Inject 30 Units under the skin Daily., Disp: 2 pen, Rfl: 11  •  Insulin Syringe-Needle U-100 (B-D INS SYRINGE 0.5CC/31GX5/16) 31G X 5/16\" 0.5 ML misc, 5 (five) times a day., Disp: , Rfl:   •  Insulin Syringe-Needle U-100 (B-D INSULIN SYRINGE 1CC/27G) 27G X 1/2\" 1 ML misc, 4 (four) times a day., Disp: , Rfl:   •  Lancets Misc. (ACCU-CHEK MULTICLIX LANCET DEV) kit, Provide lancing device and 5 lancets per day use as needed, Disp: 150 each, Rfl: 11  •  lisinopril (PRINIVIL,ZESTRIL) 10 MG tablet, Take 10 mg by mouth daily., Disp: , Rfl:   •  Loratadine (CLARITIN PO), Take 10 mg by mouth Daily., Disp: , Rfl:   •  raNITIdine (ZANTAC) 150 MG tablet, Take 150 mg by mouth 2 (Two) Times a Day., Disp: , Rfl:     Review of Systems    Review of Systems   Constitutional: Negative for activity change, appetite change, chills, diaphoresis, fatigue, fever and unexpected weight change.   HENT: Negative for congestion, drooling, ear discharge, ear pain, facial " "swelling, mouth sores, nosebleeds, postnasal drip, sinus pressure, sneezing, sore throat, tinnitus, trouble swallowing and voice change.    Eyes: Negative.  Negative for photophobia, pain, discharge, redness and itching.   Respiratory: Negative.  Negative for apnea, cough, choking, chest tightness, shortness of breath, wheezing and stridor.    Cardiovascular: Negative.  Negative for chest pain, palpitations and leg swelling.   Gastrointestinal: Negative.  Negative for abdominal distention, abdominal pain, constipation, diarrhea, nausea and vomiting.   Endocrine: Negative.  Negative for cold intolerance, heat intolerance, polydipsia, polyphagia and polyuria.   Genitourinary: Negative for difficulty urinating, dysuria, flank pain and frequency.   Musculoskeletal: Negative for arthralgias, back pain, gait problem, joint swelling, myalgias, neck pain and neck stiffness.   Skin: Negative for color change, pallor, rash and wound.   Allergic/Immunologic: Negative for environmental allergies, food allergies and immunocompromised state.   Neurological: Negative for dizziness, tremors, seizures, syncope, facial asymmetry, speech difficulty, weakness, light-headedness, numbness and headaches.   Hematological: Negative for adenopathy. Does not bruise/bleed easily.   Psychiatric/Behavioral: Negative for agitation, behavioral problems, confusion, decreased concentration, dysphoric mood, hallucinations, self-injury, sleep disturbance and suicidal ideas. The patient is not nervous/anxious and is not hyperactive.         Objective:     /90 (BP Location: Left arm, Patient Position: Sitting, Cuff Size: Large Adult)   Pulse 100   Ht 160 cm (63\")   Wt 85 kg (187 lb 4.8 oz)   SpO2 94%   BMI 33.18 kg/m²     Physical Exam   Constitutional: She is oriented to person, place, and time. She appears well-developed.   HENT:   Head: Normocephalic.   Right Ear: External ear normal.   Left Ear: External ear normal.   Nose: Nose normal. "   Eyes: Conjunctivae and EOM are normal. No scleral icterus.   Neck: Normal range of motion. Neck supple. No tracheal deviation present. No thyromegaly present.   Cardiovascular: Normal rate, regular rhythm and intact distal pulses.  Exam reveals no gallop and no friction rub.    Murmur heard.  Diastolic murmur    Pulmonary/Chest: Effort normal and breath sounds normal. No stridor. No respiratory distress. She has no wheezes. She has no rales. She exhibits no tenderness.   Abdominal: Soft. Bowel sounds are normal. She exhibits no distension and no mass. There is no tenderness. There is no rebound and no guarding.   Musculoskeletal: Normal range of motion. She exhibits no tenderness or deformity.   Lymphadenopathy:     She has no cervical adenopathy.   Neurological: She is alert and oriented to person, place, and time. She displays normal reflexes. She exhibits normal muscle tone. Coordination normal.   Skin: No rash noted. No erythema. No pallor.   Psychiatric: She has a normal mood and affect. Her behavior is normal. Judgment and thought content normal.       Lab Review    Results for orders placed or performed in visit on 03/09/18   PTH, Intact   Result Value Ref Range    PTH, Intact 78.6 (H) 10.0 - 65.0 pg/mL   CBC Auto Differential   Result Value Ref Range    WBC 6.86 3.20 - 9.80 10*3/mm3    RBC 4.39 3.77 - 5.16 10*6/mm3    Hemoglobin 12.9 12.0 - 15.5 g/dL    Hematocrit 38.9 35.0 - 45.0 %    MCV 88.6 80.0 - 98.0 fL    MCH 29.4 26.5 - 34.0 pg    MCHC 33.2 31.4 - 36.0 g/dL    RDW 13.5 11.5 - 14.5 %    RDW-SD 44.4 36.4 - 46.3 fl    MPV 12.2 (H) 8.0 - 12.0 fL    Platelets 239 150 - 450 10*3/mm3    Neutrophil % 63.0 37.0 - 80.0 %    Lymphocyte % 26.5 10.0 - 50.0 %    Monocyte % 6.0 0.0 - 12.0 %    Eosinophil % 4.1 0.0 - 7.0 %    Basophil % 0.3 0.0 - 2.0 %    Immature Grans % 0.1 0.0 - 0.5 %    Neutrophils, Absolute 4.32 2.00 - 8.60 10*3/mm3    Lymphocytes, Absolute 1.82 0.60 - 4.20 10*3/mm3    Monocytes, Absolute  0.41 0.00 - 0.90 10*3/mm3    Eosinophils, Absolute 0.28 0.00 - 0.70 10*3/mm3    Basophils, Absolute 0.02 0.00 - 0.20 10*3/mm3    Immature Grans, Absolute 0.01 0.00 - 0.02 10*3/mm3    nRBC 0.0 0.0 - 0.0 /100 WBC   Phosphorus   Result Value Ref Range    Phosphorus 4.3 2.4 - 4.4 mg/dL           Assessment/Plan       ICD-10-CM ICD-9-CM   1. 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 Hypertension associated with diabetes E11.59 250.80    I10 401.9   3. Mixed diabetic hyperlipidemia associated with type 1 diabetes mellitus E10.69 250.81    E78.2 272.2   4. Vitamin D deficiency E55.9 268.9   5. Diastolic murmur I38 785.2       Glycemic Mgmt    Lab Results   Component Value Date    HGBA1C 7.9 (H) 03/09/2018    HGBA1C 7.99 (H) 05/25/2017    HGBA1C 7.9 (H) 01/09/2017     Lab Results   Component Value Date    MICROALBUR 1.9 03/09/2018    CREATININE 1.16 (H) 03/09/2018     Can't use lantus or basaglar, profound hypoglycemia and seizures    No carb counting    She gives insulin based on experience and doesn't like change so she is still using syringe and vials    Toujeo , 16 to 20  units at night     Novolog- now Humalog,  15 with meals but she adjusts between 10 and 18  It seems that she does a carb ratio of 5 and I suggested to try it and self titration explained.  I sugggested sensitivity of 40    I did give her pens for her to try but preferred syringes    I suggested sensor, she will think about it.                                                                                                                                                                                                                                                                                                                                                                                                                                                                                                                                                        Lipid Management    Lab Results   Component Value Date    CHOL 171 03/09/2018    CHOL 140 05/25/2017     Lab Results   Component Value Date    TRIG 112 03/09/2018    TRIG 66 05/25/2017    TRIG 93 01/09/2017     Lab Results   Component Value Date    HDL 59 (L) 03/09/2018    HDL 64 05/25/2017    HDL 53 (L) 01/09/2017     No components found for: LDLCALC  No results found for: LDLDIRECT      Lipids at goal     continue     Lipitor 40 mg tablet, 1 1/2 tabs daily   Blood Pressure Management:  Target blood pressure less than 130/80 mm/hg, On Ace Inhibitor/ARB, Control of blood pressure    Vitals:    03/15/18 0955   BP: 142/90   Pulse: 100   SpO2: 94%       Now managed by Moris      Microvascular Complication Monitoring:   No Diabetic Neuropathy  following with Dr. Moore in [facility]    Now following w nephrology     On lasix 20 mg daily   On lisinopril 10 mg daily     not taking NSAIDS, bp well controlled, diabetes needs to improve     no neuropathy - has plantar fasciitis   Immunizations:  Patient prefers not to receive influenza immunization, Patient prefers not to receive pneumococcal immunization  Preventive Care:  Patient is not smoking  Weight Related:  Obesity, Counseled on nutrition, Counseled on physical activity  Bone Health  Vit D Def  can't tolerate Vit D3 but can tolerate vit D2    On 50 th u every weeky again     Lab Results   Component Value Date    PKIQ21QK 25.6 (L) 03/09/2018    [id number] 46.0 05/25/2017    LQDF39LC 37.5 01/09/2017           Other Diabetes Related Aspects    Lab Results   Component Value Date    TSH 0.770 03/09/2018     Lab Results   Component Value Date    [id number] 917 03/09/2018   neg celiac panel in  2016     Murmur , asymptomatic, diastolic to my impression , follow clinically     I reviewed and summarized records from APOLINAR Boyd from 2016 and I reviewed / ordered labs.     Orders Placed This Encounter   Procedures   • CBC  Auto Differential   • Celiac Panel Reflex To Titer   • Comprehensive Metabolic Panel   • Hemoglobin A1c   • Lipid Panel   • Microalbumin / Creatinine Urine Ratio - Urine, Clean Catch   • TSH   • Vitamin B12   • Vitamin D 25 Hydroxy         A copy of my note was sent to APOLINAR Boyd    Please see my above opinion and suggestions.

## 2018-03-19 LAB — GLUCOSE BLDC GLUCOMTR-MCNC: 287 MG/DL (ref 70–130)

## 2018-10-15 ENCOUNTER — TELEPHONE (OUTPATIENT)
Dept: ENDOCRINOLOGY | Facility: CLINIC | Age: 48
End: 2018-10-15

## 2018-10-16 ENCOUNTER — APPOINTMENT (OUTPATIENT)
Dept: LAB | Facility: HOSPITAL | Age: 48
End: 2018-10-16

## 2018-10-16 LAB
25(OH)D3 SERPL-MCNC: 49.3 NG/ML (ref 30–100)
ALBUMIN SERPL-MCNC: 4.5 G/DL (ref 3.4–4.8)
ALBUMIN UR-MCNC: 2.8 MG/L
ALBUMIN/GLOB SERPL: 1.4 G/DL (ref 1.1–1.8)
ALP SERPL-CCNC: 117 U/L (ref 38–126)
ALT SERPL W P-5'-P-CCNC: 31 U/L (ref 9–52)
ANION GAP SERPL CALCULATED.3IONS-SCNC: 15 MMOL/L (ref 5–15)
ARTICHOKE IGE QN: 60 MG/DL (ref 1–129)
AST SERPL-CCNC: 28 U/L (ref 14–36)
BASOPHILS # BLD AUTO: 0.01 10*3/MM3 (ref 0–0.2)
BASOPHILS NFR BLD AUTO: 0.2 % (ref 0–2)
BILIRUB SERPL-MCNC: 0.4 MG/DL (ref 0.2–1.3)
BUN BLD-MCNC: 30 MG/DL (ref 7–21)
BUN/CREAT SERPL: 24 (ref 7–25)
CALCIUM SPEC-SCNC: 9.7 MG/DL (ref 8.4–10.2)
CHLORIDE SERPL-SCNC: 98 MMOL/L (ref 95–110)
CHOLEST SERPL-MCNC: 154 MG/DL (ref 0–199)
CO2 SERPL-SCNC: 23 MMOL/L (ref 22–31)
CREAT BLD-MCNC: 1.25 MG/DL (ref 0.5–1)
CREAT UR-MCNC: 57.5 MG/DL
DEPRECATED RDW RBC AUTO: 43 FL (ref 36.4–46.3)
EOSINOPHIL # BLD AUTO: 0.46 10*3/MM3 (ref 0–0.7)
EOSINOPHIL NFR BLD AUTO: 8.2 % (ref 0–7)
ERYTHROCYTE [DISTWIDTH] IN BLOOD BY AUTOMATED COUNT: 13.6 % (ref 11.5–14.5)
GFR SERPL CREATININE-BSD FRML MDRD: 45 ML/MIN/1.73 (ref 51–120)
GLOBULIN UR ELPH-MCNC: 3.2 GM/DL (ref 2.3–3.5)
GLUCOSE BLD-MCNC: 154 MG/DL (ref 60–100)
HBA1C MFR BLD: 8.3 % (ref 4–5.6)
HCT VFR BLD AUTO: 39.6 % (ref 35–45)
HDLC SERPL-MCNC: 65 MG/DL (ref 60–200)
HGB BLD-MCNC: 13.2 G/DL (ref 12–15.5)
IMM GRANULOCYTES # BLD: 0 10*3/MM3 (ref 0–0.02)
IMM GRANULOCYTES NFR BLD: 0 % (ref 0–0.5)
LDLC/HDLC SERPL: 1.07 {RATIO} (ref 0–3.22)
LYMPHOCYTES # BLD AUTO: 2.3 10*3/MM3 (ref 0.6–4.2)
LYMPHOCYTES NFR BLD AUTO: 40.9 % (ref 10–50)
MCH RBC QN AUTO: 28.9 PG (ref 26.5–34)
MCHC RBC AUTO-ENTMCNC: 33.3 G/DL (ref 31.4–36)
MCV RBC AUTO: 86.7 FL (ref 80–98)
MICROALBUMIN/CREAT UR: 48.7 MG/G (ref 0–30)
MONOCYTES # BLD AUTO: 0.38 10*3/MM3 (ref 0–0.9)
MONOCYTES NFR BLD AUTO: 6.8 % (ref 0–12)
NEUTROPHILS # BLD AUTO: 2.47 10*3/MM3 (ref 2–8.6)
NEUTROPHILS NFR BLD AUTO: 43.9 % (ref 37–80)
PLATELET # BLD AUTO: 218 10*3/MM3 (ref 150–450)
PMV BLD AUTO: 12 FL (ref 8–12)
POTASSIUM BLD-SCNC: 4.2 MMOL/L (ref 3.5–5.1)
PROT SERPL-MCNC: 7.7 G/DL (ref 6.3–8.6)
RBC # BLD AUTO: 4.57 10*6/MM3 (ref 3.77–5.16)
SODIUM BLD-SCNC: 136 MMOL/L (ref 137–145)
TRIGL SERPL-MCNC: 96 MG/DL (ref 20–199)
TSH SERPL DL<=0.05 MIU/L-ACNC: 0.9 MIU/ML (ref 0.46–4.68)
VIT B12 BLD-MCNC: 962 PG/ML (ref 239–931)
WBC NRBC COR # BLD: 5.62 10*3/MM3 (ref 3.2–9.8)

## 2018-10-16 PROCEDURE — 80053 COMPREHEN METABOLIC PANEL: CPT | Performed by: INTERNAL MEDICINE

## 2018-10-16 PROCEDURE — 83036 HEMOGLOBIN GLYCOSYLATED A1C: CPT | Performed by: INTERNAL MEDICINE

## 2018-10-16 PROCEDURE — 83516 IMMUNOASSAY NONANTIBODY: CPT | Performed by: INTERNAL MEDICINE

## 2018-10-16 PROCEDURE — 84443 ASSAY THYROID STIM HORMONE: CPT | Performed by: INTERNAL MEDICINE

## 2018-10-16 PROCEDURE — 82043 UR ALBUMIN QUANTITATIVE: CPT | Performed by: INTERNAL MEDICINE

## 2018-10-16 PROCEDURE — 80061 LIPID PANEL: CPT | Performed by: INTERNAL MEDICINE

## 2018-10-16 PROCEDURE — 85025 COMPLETE CBC W/AUTO DIFF WBC: CPT | Performed by: INTERNAL MEDICINE

## 2018-10-16 PROCEDURE — 82607 VITAMIN B-12: CPT | Performed by: INTERNAL MEDICINE

## 2018-10-16 PROCEDURE — 82306 VITAMIN D 25 HYDROXY: CPT | Performed by: INTERNAL MEDICINE

## 2018-10-16 PROCEDURE — 82570 ASSAY OF URINE CREATININE: CPT | Performed by: INTERNAL MEDICINE

## 2018-10-16 PROCEDURE — 82784 ASSAY IGA/IGD/IGG/IGM EACH: CPT | Performed by: INTERNAL MEDICINE

## 2018-10-16 PROCEDURE — 36415 COLL VENOUS BLD VENIPUNCTURE: CPT | Performed by: INTERNAL MEDICINE

## 2018-10-17 LAB
GLIADIN PEPTIDE IGA SER-ACNC: 3 UNITS (ref 0–19)
IGA SERPL-MCNC: 221 MG/DL (ref 87–352)
TTG IGA SER-ACNC: <2 U/ML (ref 0–3)

## 2018-10-22 ENCOUNTER — OFFICE VISIT (OUTPATIENT)
Dept: ENDOCRINOLOGY | Facility: CLINIC | Age: 48
End: 2018-10-22

## 2018-10-22 VITALS
BODY MASS INDEX: 32.76 KG/M2 | WEIGHT: 184.9 LBS | SYSTOLIC BLOOD PRESSURE: 138 MMHG | HEART RATE: 101 BPM | OXYGEN SATURATION: 97 % | DIASTOLIC BLOOD PRESSURE: 80 MMHG | HEIGHT: 63 IN

## 2018-10-22 DIAGNOSIS — E10.3413: ICD-10-CM

## 2018-10-22 DIAGNOSIS — I15.2 HYPERTENSION ASSOCIATED WITH DIABETES (HCC): ICD-10-CM

## 2018-10-22 DIAGNOSIS — E11.59 HYPERTENSION ASSOCIATED WITH DIABETES (HCC): ICD-10-CM

## 2018-10-22 DIAGNOSIS — E10.21 DIABETIC NEPHROPATHY ASSOCIATED WITH TYPE 1 DIABETES MELLITUS (HCC): ICD-10-CM

## 2018-10-22 DIAGNOSIS — E10.69 MIXED DIABETIC HYPERLIPIDEMIA ASSOCIATED WITH TYPE 1 DIABETES MELLITUS (HCC): ICD-10-CM

## 2018-10-22 DIAGNOSIS — E10.649 TYPE 1 DIABETES MELLITUS WITH HYPOGLYCEMIA UNAWARENESS (HCC): Primary | ICD-10-CM

## 2018-10-22 DIAGNOSIS — E55.9 VITAMIN D DEFICIENCY: ICD-10-CM

## 2018-10-22 DIAGNOSIS — E78.2 MIXED DIABETIC HYPERLIPIDEMIA ASSOCIATED WITH TYPE 1 DIABETES MELLITUS (HCC): ICD-10-CM

## 2018-10-22 PROCEDURE — 99214 OFFICE O/P EST MOD 30 MIN: CPT | Performed by: INTERNAL MEDICINE

## 2018-10-22 NOTE — PROGRESS NOTES
Amanda Padron is a 48 y.o. female who presents for  evaluation of   Chief Complaint   Patient presents with   • Diabetes     bs         Primary Care / Referring Provider  Naima Connolly APRN    History of Present Illness  Duration/Timing:  Diabetes mellitus type 1, Age at onset of diabetes: 11 years, Onset of symptoms sudden  Timing - constant  Qualtiy - uncontrolled, improved   Severiy - High since there is associated DR.    Severity (Complications/Hospitalizations)  Secondary Macrovascular Complications:  No CAD, No CVA, No PAD  Secondary Microvascular Complications:  Diabetic Nephropathy , Diabetic Retinopathy, No Diabetic Neuropathy      Context  Diabetes Regimen:  Insulin,   Lab Results   Component Value Date    HGBA1C 8.3 (H) 10/16/2018       Blood Glucose Readings  both hypo and hyperglycemia but minimized   Diet  variable carb intake  Exercise:  Does not exercise    Associated Signs/Symptoms  Hyperglycemic Symptoms:  No polyuria, No polydipsia, No polyphagia, Blurred vision  Hypoglycemic Episodes:  Documented symptomatic hypoglycemia, Hypoglycemic unawareness, Seizures and syncope related to hypoglycemia      Past Medical History:   Diagnosis Date   • Acute conjunctivitis    • 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[facility])     not controlled due to hypoglycemia      • Diabetic retinopathy (CMS/[facility])    • Dyslipidemia    • Hypertensive disorder    • Vitamin D deficiency      Family History   Problem Relation Age of Onset   • Coronary artery disease Other    • Diabetes Other    • Hypertension Other      Social History   Substance Use Topics   • Smoking status: Never Smoker   • Smokeless tobacco: Never Used   • Alcohol use Not on file         Current Outpatient Prescriptions:   •  acetaminophen (TYLENOL) 650 MG 8 hr tablet, Take 650 mg by mouth Every 8 (Eight) Hours As Needed for Mild Pain ., Disp: , Rfl:   •  ALPRAZolam (XANAX) 0.5 MG tablet, Take 0.5 mg by mouth 2 (Two) Times a Day As Needed for Anxiety.,  "Disp: , Rfl:   •  atorvastatin (LIPITOR) 40 MG tablet, Take 60 mg by mouth daily., Disp: , Rfl:   •  BD INSULIN SYRINGE ULTRAFINE 31G X 15/64\" 0.5 ML misc, INJECT INSULIN 5 TIMES DAILY AS DIRECTED, Disp: 200 each, Rfl: 11  •  buPROPion (WELLBUTRIN) 100 MG tablet, Take 100 mg by mouth 2 (Two) Times a Day., Disp: , Rfl:   •  furosemide (LASIX) 40 MG tablet, Take 20 mg by mouth Every Other Day., Disp: , Rfl:   •  gabapentin (NEURONTIN) 100 MG capsule, Take 100 mg by mouth Daily., Disp: , Rfl:   •  glucagon (GLUCAGON EMERGENCY) 1 MG injection, Inject  under the skin. Use as directed., Disp: , Rfl:   •  glucose blood (ACCU-CHEK CELSO PLUS) test strip, 1 each by Other route 4 (four) times a day. Use as instructed, Disp: , Rfl:   •  HUMALOG 100 UNIT/ML injection, INJECT 20 UNITS 3 TIMES DAILY BEFORE EVERY MEAL, Disp: 5 each, Rfl: 5  •  hydrALAZINE (APRESOLINE) 25 MG tablet, Take 50 mg by mouth 3 (Three) Times a Day., Disp: , Rfl:   •  Insulin Glargine (TOUJEO SOLOSTAR) 300 UNIT/ML solution pen-injector, Inject 30 Units under the skin Daily., Disp: 2 pen, Rfl: 11  •  Insulin Pen Needle (ADVOCATE INSULIN PEN NEEDLES) 31G X 5 MM misc, Use to inject insulin 4 times per day, Disp: 200 each, Rfl: 11  •  Insulin Syringe-Needle U-100 (B-D INS SYRINGE 0.5CC/31GX5/16) 31G X 5/16\" 0.5 ML misc, 5 (five) times a day., Disp: , Rfl:   •  Insulin Syringe-Needle U-100 (B-D INSULIN SYRINGE 1CC/27G) 27G X 1/2\" 1 ML misc, 4 (four) times a day., Disp: , Rfl:   •  Lancets Misc. (ACCU-CHEK MULTICLIX LANCET DEV) kit, Provide lancing device and 5 lancets per day use as needed, Disp: 150 each, Rfl: 11  •  lisinopril (PRINIVIL,ZESTRIL) 10 MG tablet, Take 10 mg by mouth daily., Disp: , Rfl:   •  Loratadine (CLARITIN PO), Take 10 mg by mouth Daily., Disp: , Rfl:   •  raNITIdine (ZANTAC) 150 MG tablet, Take 150 mg by mouth 2 (Two) Times a Day., Disp: , Rfl:     Review of Systems    Review of Systems   Constitutional: Negative for activity change, " "appetite change, chills, diaphoresis, fatigue, fever and unexpected weight change.   HENT: Negative for congestion, drooling, ear discharge, ear pain, facial swelling, mouth sores, nosebleeds, postnasal drip, sinus pressure, sneezing, sore throat, tinnitus, trouble swallowing and voice change.    Eyes: Negative.  Negative for photophobia, pain, discharge, redness and itching.   Respiratory: Negative.  Negative for apnea, cough, choking, chest tightness, shortness of breath, wheezing and stridor.    Cardiovascular: Negative.  Negative for chest pain, palpitations and leg swelling.   Gastrointestinal: Negative.  Negative for abdominal distention, abdominal pain, constipation, diarrhea, nausea and vomiting.   Endocrine: Negative.  Negative for cold intolerance, heat intolerance, polydipsia, polyphagia and polyuria.   Genitourinary: Negative for difficulty urinating, dysuria, flank pain and frequency.   Musculoskeletal: Negative for arthralgias, back pain, gait problem, joint swelling, myalgias, neck pain and neck stiffness.   Skin: Negative for color change, pallor, rash and wound.   Allergic/Immunologic: Negative for environmental allergies, food allergies and immunocompromised state.   Neurological: Negative for dizziness, tremors, seizures, syncope, facial asymmetry, speech difficulty, weakness, light-headedness, numbness and headaches.   Hematological: Negative for adenopathy. Does not bruise/bleed easily.   Psychiatric/Behavioral: Negative for agitation, behavioral problems, confusion, decreased concentration, dysphoric mood, hallucinations, self-injury, sleep disturbance and suicidal ideas. The patient is not nervous/anxious and is not hyperactive.         Objective:     /80   Pulse 101   Ht 160 cm (63\")   Wt 83.9 kg (184 lb 14.4 oz)   SpO2 97%   BMI 32.75 kg/m²     Physical Exam   Constitutional: She is oriented to person, place, and time. She appears well-developed.   HENT:   Head: Normocephalic. "   Right Ear: External ear normal.   Left Ear: External ear normal.   Nose: Nose normal.   Eyes: Conjunctivae and EOM are normal. No scleral icterus.   Neck: Normal range of motion. Neck supple. No tracheal deviation present. No thyromegaly present.   Cardiovascular: Normal rate, regular rhythm and intact distal pulses.  Exam reveals no gallop and no friction rub.    Murmur heard.  Diastolic murmur    Pulmonary/Chest: Effort normal and breath sounds normal. No stridor. No respiratory distress. She has no wheezes. She has no rales. She exhibits no tenderness.   Abdominal: Soft. Bowel sounds are normal. She exhibits no distension and no mass. There is no tenderness. There is no rebound and no guarding.   Musculoskeletal: Normal range of motion. She exhibits no tenderness or deformity.   Lymphadenopathy:     She has no cervical adenopathy.   Neurological: She is alert and oriented to person, place, and time. She displays normal reflexes. She exhibits normal muscle tone. Coordination normal.   Skin: No rash noted. No erythema. No pallor.   Psychiatric: She has a normal mood and affect. Her behavior is normal. Judgment and thought content normal.       Lab Review            Assessment/Plan       ICD-10-CM ICD-9-CM   1. Type 1 diabetes mellitus with hypoglycemia unawareness (CMS/[facility]) E10.649 250.81   2. Hypertension associated with diabetes (CMS/[facility]) E11.59 250.80    I10 401.9   3. Mixed diabetic hyperlipidemia associated with type 1 diabetes mellitus (CMS/[facility]) E10.69 250.81    E78.2 272.2   4. Vitamin D deficiency E55.9 268.9   5. Severe nonproliferative diabetic retinopathy of both eyes with macular edema associated with type 1 diabetes mellitus (CMS/[facility]) E10.3413 250.51     362.07     362.06   6. Diabetic nephropathy associated with type 1 diabetes mellitus (CMS/[facility]) E10.21 250.41     583.81       Glycemic Mgmt    Lab Results   Component Value Date    HGBA1C 8.3 (H) 10/16/2018    HGBA1C 7.9 (H) 03/09/2018    HGBA1C 7.99  (H) 05/25/2017     Lab Results   Component Value Date    MICROALBUR 2.8 10/16/2018    CREATININE 1.25 (H) 10/16/2018     Can't use lantus or basaglar, profound hypoglycemia and seizures    No carb counting    She gives insulin based on experience and doesn't like change so she is still using syringe and vials    Toujeo , 16 to 20  units at night     Humalog Kwik Pens  15 with meals but she adjusts between 10 and 18  It seems that she does a carb ratio of 5 and I suggested to try it and self titration explained.  I sugggested sensitivity of 40    I suggested sensor, she will think about it.                                                                                                                                                                                                                                                                                                                                                                                                                                                                                                                                                       Lipid Management    Lab Results   Component Value Date    CHOL 154 10/16/2018    CHOL 171 03/09/2018    CHOL 140 05/25/2017     Lab Results   Component Value Date    TRIG 96 10/16/2018    TRIG 112 03/09/2018    TRIG 66 05/25/2017     Lab Results   Component Value Date    HDL 65 10/16/2018    HDL 59 (L) 03/09/2018    HDL 64 05/25/2017       Lab Results   Component Value Date    LDL 60 10/16/2018    LDL 72 03/09/2018    LDL 60 05/25/2017         Lipids at goal     continue     Lipitor 40 mg tablet, 1 1/2 tabs daily   Blood Pressure Management:  Target blood pressure less than 130/80 mm/hg, On Ace Inhibitor/ARB, Control of blood pressure    Vitals:    10/22/18 0953   BP: 138/80   Pulse: 101   SpO2: 97%       Now managed by Moris      Microvascular Complication Monitoring:   No Diabetic Neuropathy  following  with Dr. Moore in [place] has DR    Now following w nephrology     On lasix 20 mg daily   On lisinopril 10 mg daily     not taking NSAIDS, bp well controlled, diabetes needs to improve     no neuropathy - has plantar fasciitis   Immunizations:  Patient prefers not to receive influenza immunization, Patient prefers not to receive pneumococcal immunization  Preventive Care:  Patient is not smoking  Weight Related:  Obesity, Counseled on nutrition, Counseled on physical activity  Bone Health  Vit D Def  can't tolerate Vit D3 but can tolerate vit D2    On 50 th u every weeky again     Lab Results   Component Value Date    MOGC39SK 49.3 10/16/2018    IYUU55LY 25.6 (L) 03/09/2018    ORUO84GL 46.0 05/25/2017           Other Diabetes Related Aspects    Lab Results   Component Value Date    TSH 0.900 10/16/2018     Lab Results   Component Value Date    [id number] 962 (H) 10/16/2018   neg celiac panel in  2016     Murmur , asymptomatic, diastolic to my impression , follow clinically     I reviewed and summarized records from Naima Connolly APRN from 2016 and I reviewed / ordered labs.     No orders of the defined types were placed in this encounter.        A copy of my note was sent to Naima Connolly APRN    Please see my above opinion and suggestions.

## 2018-12-07 RX ORDER — INSULIN LISPRO 200 [IU]/ML
INJECTION, SOLUTION SUBCUTANEOUS
Qty: 5 PEN | Refills: 5 | Status: SHIPPED | OUTPATIENT
Start: 2018-12-07 | End: 2020-09-24

## 2019-09-04 ENCOUNTER — OFFICE VISIT (OUTPATIENT)
Dept: ENDOCRINOLOGY | Facility: CLINIC | Age: 49
End: 2019-09-04

## 2019-09-04 VITALS
HEIGHT: 63 IN | SYSTOLIC BLOOD PRESSURE: 148 MMHG | OXYGEN SATURATION: 98 % | HEART RATE: 107 BPM | DIASTOLIC BLOOD PRESSURE: 76 MMHG | BODY MASS INDEX: 33.54 KG/M2 | WEIGHT: 189.3 LBS

## 2019-09-04 DIAGNOSIS — E78.2 MIXED DIABETIC HYPERLIPIDEMIA ASSOCIATED WITH TYPE 1 DIABETES MELLITUS (HCC): ICD-10-CM

## 2019-09-04 DIAGNOSIS — I38 DIASTOLIC MURMUR: ICD-10-CM

## 2019-09-04 DIAGNOSIS — E10.21 DIABETIC NEPHROPATHY ASSOCIATED WITH TYPE 1 DIABETES MELLITUS (HCC): ICD-10-CM

## 2019-09-04 DIAGNOSIS — E55.9 VITAMIN D DEFICIENCY: ICD-10-CM

## 2019-09-04 DIAGNOSIS — E11.59 HYPERTENSION ASSOCIATED WITH DIABETES (HCC): ICD-10-CM

## 2019-09-04 DIAGNOSIS — I15.2 HYPERTENSION ASSOCIATED WITH DIABETES (HCC): ICD-10-CM

## 2019-09-04 DIAGNOSIS — E10.3413: ICD-10-CM

## 2019-09-04 DIAGNOSIS — E10.649 TYPE 1 DIABETES MELLITUS WITH HYPOGLYCEMIA UNAWARENESS (HCC): Primary | ICD-10-CM

## 2019-09-04 DIAGNOSIS — E10.69 MIXED DIABETIC HYPERLIPIDEMIA ASSOCIATED WITH TYPE 1 DIABETES MELLITUS (HCC): ICD-10-CM

## 2019-09-04 LAB — HBA1C MFR BLD: 8.8 %

## 2019-09-04 PROCEDURE — 99214 OFFICE O/P EST MOD 30 MIN: CPT | Performed by: INTERNAL MEDICINE

## 2019-09-04 NOTE — PROGRESS NOTES
Amanda Padron is a 49 y.o. female who presents for  evaluation of   Chief Complaint   Patient presents with   • Diabetes         Primary Care / Referring Provider  Naima Connolly APRN    History of Present Illness  Duration/Timing:  Diabetes mellitus type 1, Age at onset of diabetes: 11 years, Onset of symptoms sudden  Timing - constant  Qualtiy - uncontrolled, improved   Severiy - High since there is associated DR.    Severity (Complications/Hospitalizations)  Secondary Macrovascular Complications:  No CAD, No CVA, No PAD  Secondary Microvascular Complications:  Diabetic Nephropathy , Diabetic Retinopathy, No Diabetic Neuropathy      Context  Diabetes Regimen:  Insulin,   Lab Results   Component Value Date    HGBA1C 8.8 09/03/2019       Blood Glucose Readings  both hypo and hyperglycemia but minimized   Diet  variable carb intake  Exercise:  Does not exercise    Associated Signs/Symptoms  Hyperglycemic Symptoms:  No polyuria, No polydipsia, No polyphagia, Blurred vision  Hypoglycemic Episodes:  Documented symptomatic hypoglycemia, Hypoglycemic unawareness, Seizures and syncope related to hypoglycemia      Past Medical History:   Diagnosis Date   • Acute conjunctivitis    • 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[facility])     not controlled due to hypoglycemia      • Diabetic retinopathy (CMS/[facility])    • Dyslipidemia    • Hypertensive disorder    • Vitamin D deficiency      Family History   Problem Relation Age of Onset   • Coronary artery disease Other    • Diabetes Other    • Hypertension Other      Social History     Tobacco Use   • Smoking status: Never Smoker   • Smokeless tobacco: Never Used   Substance Use Topics   • Alcohol use: Not on file   • Drug use: Not on file         Current Outpatient Medications:   •  acetaminophen (TYLENOL) 650 MG 8 hr tablet, Take 650 mg by mouth Every 8 (Eight) Hours As Needed for Mild Pain ., Disp: , Rfl:   •  ALPRAZolam (XANAX) 0.5 MG tablet, Take 0.5 mg by mouth 2 (Two) Times a  "Day As Needed for Anxiety., Disp: , Rfl:   •  atorvastatin (LIPITOR) 40 MG tablet, Take 60 mg by mouth daily., Disp: , Rfl:   •  BD INSULIN SYRINGE ULTRAFINE 31G X 15/64\" 0.5 ML misc, INJECT INSULIN 5 TIMES DAILY AS DIRECTED, Disp: 200 each, Rfl: 11  •  buPROPion (WELLBUTRIN) 100 MG tablet, Take 100 mg by mouth 2 (Two) Times a Day., Disp: , Rfl:   •  furosemide (LASIX) 40 MG tablet, Take 20 mg by mouth Every Other Day., Disp: , Rfl:   •  gabapentin (NEURONTIN) 100 MG capsule, Take 100 mg by mouth Daily., Disp: , Rfl:   •  glucagon (GLUCAGON EMERGENCY) 1 MG injection, Inject  under the skin. Use as directed., Disp: , Rfl:   •  glucose blood (ACCU-CHEK CELSO PLUS) test strip, 1 each by Other route 4 (four) times a day. Use as instructed, Disp: , Rfl:   •  HUMALOG KWIKPEN 200 UNIT/ML solution pen-injector, INJECT 40 UNITS SUBCUTANEOUSLY THREE TIMES DAILY WITH MEALS, Disp: 5 pen, Rfl: 5  •  hydrALAZINE (APRESOLINE) 25 MG tablet, Take 50 mg by mouth 3 (Three) Times a Day., Disp: , Rfl:   •  Insulin Glargine (TOUJEO SOLOSTAR) 300 UNIT/ML solution pen-injector, Inject 30 Units under the skin Daily., Disp: 2 pen, Rfl: 11  •  Insulin Pen Needle (ADVOCATE INSULIN PEN NEEDLES) 31G X 5 MM misc, Use to inject insulin 4 times per day, Disp: 200 each, Rfl: 11  •  Insulin Syringe-Needle U-100 (B-D INS SYRINGE 0.5CC/31GX5/16) 31G X 5/16\" 0.5 ML misc, 5 (five) times a day., Disp: , Rfl:   •  Insulin Syringe-Needle U-100 (B-D INSULIN SYRINGE 1CC/27G) 27G X 1/2\" 1 ML misc, 4 (four) times a day., Disp: , Rfl:   •  Lancets Misc. (ACCU-CHEK MULTICLIX LANCET DEV) kit, Provide lancing device and 5 lancets per day use as needed, Disp: 150 each, Rfl: 11  •  lisinopril (PRINIVIL,ZESTRIL) 10 MG tablet, Take 10 mg by mouth daily., Disp: , Rfl:   •  Loratadine (CLARITIN PO), Take 10 mg by mouth Daily., Disp: , Rfl:   •  raNITIdine (ZANTAC) 150 MG tablet, Take 150 mg by mouth 2 (Two) Times a Day., Disp: , Rfl:   •  HUMALOG 100 UNIT/ML injection, " "INJECT 20 UNITS 3 TIMES DAILY BEFORE EVERY MEAL, Disp: 5 each, Rfl: 5    Review of Systems    Review of Systems   Constitutional: Negative for activity change, appetite change, chills, diaphoresis, fatigue, fever and unexpected weight change.   HENT: Negative for congestion, drooling, ear discharge, ear pain, facial swelling, mouth sores, nosebleeds, postnasal drip, sinus pressure, sneezing, sore throat, tinnitus, trouble swallowing and voice change.    Eyes: Negative.  Negative for photophobia, pain, discharge, redness and itching.   Respiratory: Negative.  Negative for apnea, cough, choking, chest tightness, shortness of breath, wheezing and stridor.    Cardiovascular: Negative.  Negative for chest pain, palpitations and leg swelling.   Gastrointestinal: Negative.  Negative for abdominal distention, abdominal pain, constipation, diarrhea, nausea and vomiting.   Endocrine: Negative.  Negative for cold intolerance, heat intolerance, polydipsia, polyphagia and polyuria.   Genitourinary: Negative for difficulty urinating, dysuria, flank pain and frequency.   Musculoskeletal: Negative for arthralgias, back pain, gait problem, joint swelling, myalgias, neck pain and neck stiffness.   Skin: Negative for color change, pallor, rash and wound.   Allergic/Immunologic: Negative for environmental allergies, food allergies and immunocompromised state.   Neurological: Negative for dizziness, tremors, seizures, syncope, facial asymmetry, speech difficulty, weakness, light-headedness, numbness and headaches.   Hematological: Negative for adenopathy. Does not bruise/bleed easily.   Psychiatric/Behavioral: Negative for agitation, behavioral problems, confusion, decreased concentration, dysphoric mood, hallucinations, self-injury, sleep disturbance and suicidal ideas. The patient is not nervous/anxious and is not hyperactive.         Objective:     /76   Pulse 107   Ht 160 cm (63\")   Wt 85.9 kg (189 lb 4.8 oz)   SpO2 98%   " BMI 33.53 kg/m²     Physical Exam   Constitutional: She is oriented to person, place, and time. She appears well-developed.   HENT:   Head: Normocephalic.   Right Ear: External ear normal.   Left Ear: External ear normal.   Nose: Nose normal.   Eyes: Conjunctivae and EOM are normal. No scleral icterus.   Neck: Normal range of motion. Neck supple. No tracheal deviation present. No thyromegaly present.   Cardiovascular: Normal rate, regular rhythm and intact distal pulses. Exam reveals no gallop and no friction rub.   Murmur heard.  Diastolic murmur    Pulmonary/Chest: Effort normal and breath sounds normal. No stridor. No respiratory distress. She has no wheezes. She has no rales. She exhibits no tenderness.   Abdominal: Soft. Bowel sounds are normal. She exhibits no distension and no mass. There is no tenderness. There is no rebound and no guarding.   Musculoskeletal: Normal range of motion. She exhibits no tenderness or deformity.   Lymphadenopathy:     She has no cervical adenopathy.   Neurological: She is alert and oriented to person, place, and time. She displays normal reflexes. She exhibits normal muscle tone. Coordination normal.   Skin: No rash noted. No erythema. No pallor.   Psychiatric: She has a normal mood and affect. Her behavior is normal. Judgment and thought content normal.       Lab Review            Assessment/Plan       ICD-10-CM ICD-9-CM   1. 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 Hypertension associated with diabetes (CMS/[facility]) E11.59 250.80    I10 401.9   3. Mixed diabetic hyperlipidemia associated with type 1 diabetes mellitus (CMS/[facility]) E10.69 250.81    E78.2 272.2   4. Vitamin D deficiency E55.9 268.9   5. Severe nonproliferative diabetic retinopathy of both eyes with macular edema associated with type 1 diabetes mellitus (CMS/[facility]) E10.3413 250.51     362.07     362.06   6. Diabetic nephropathy associated with type 1 diabetes mellitus (CMS/[facility])  E10.21 250.41     583.81   7. Diastolic murmur I38 785.2       Glycemic Mgmt    Lab Results   Component Value Date    HGBA1C 8.8 09/03/2019    HGBA1C 8.3 (H) 10/16/2018    HGBA1C 7.9 (H) 03/09/2018     Lab Results   Component Value Date    MICROALBUR 2.8 10/16/2018    CREATININE 1.25 (H) 10/16/2018     Can't use lantus or basaglar, profound hypoglycemia and seizures    No carb counting    She gives insulin based on experience and doesn't like change so she is still using syringe and vials    Toujeo , 16 to 20  units at night     Humalog Kwik Pens  15 with meals but she adjusts between 10 and 18  It seems that she does a carb ratio of 5 and I suggested to try it and self titration explained.  I sugggested sensitivity of 40  Change to apidra with copay card     I suggested sensor, she will think about it.                                                                                                                                                                                                                                                                                                                                                                                                                                                                                                                                                       Lipid Management    Lab Results   Component Value Date    CHOL 154 10/16/2018    CHOL 171 03/09/2018    CHOL 140 05/25/2017     Lab Results   Component Value Date    TRIG 96 10/16/2018    TRIG 112 03/09/2018    TRIG 66 05/25/2017     Lab Results   Component Value Date    HDL 65 10/16/2018    HDL 59 (L) 03/09/2018    HDL 64 05/25/2017       Lab Results   Component Value Date    LDL 60 10/16/2018    LDL 72 03/09/2018    LDL 60 05/25/2017         Lipids at goal     continue     Lipitor 40 mg tablet, 1 1/2 tabs daily   Blood Pressure Management:  Target blood pressure less than 130/80 mm/hg, On  Ace Inhibitor/ARB, Control of blood pressure    Vitals:    09/04/19 0939   BP: 148/76   Pulse: 107   SpO2: 98%       Now managed by Moris      Microvascular Complication Monitoring:   No Diabetic Neuropathy  following with Dr. Moore in [facility]     Now following w nephrology     On lasix 20 mg qod - changed to 10 mg qod   On lisinopril 10 mg daily  ( from 20 mg qd ) decided by Moris    not taking NSAIDS, bp well controlled, diabetes needs to improve     no neuropathy - has plantar fasciitis   Immunizations:  Patient prefers not to receive influenza immunization, Patient prefers not to receive pneumococcal immunization  Preventive Care:  Patient is not smoking  Weight Related:  Obesity, Counseled on nutrition, Counseled on physical activity  Bone Health  Vit D Def  can't tolerate Vit D3 but can tolerate vit D2    On 50 th u every weeky again     Lab Results   Component Value Date    MBRK37JM 49.3 10/16/2018    [id number] 25.6 (L) 03/09/2018    [id number] 46.0 05/25/2017           Other Diabetes Related Aspects    Lab Results   Component Value Date    TSH 0.900 10/16/2018     Lab Results   Component Value Date    [id number] 962 (H) 10/16/2018   neg celiac panel in  2016     Murmur , asymptomatic, diastolic to my impression , follow clinically     I reviewed and summarized records from Naima Connolly APRN from present year  and I reviewed / ordered labs.     Orders Placed This Encounter   Procedures   • Hemoglobin A1c     This order was created through External Result Entry         A copy of my note was sent to Naima Connolly APRN    Please see my above opinion and suggestions.

## 2019-10-28 ENCOUNTER — TELEPHONE (OUTPATIENT)
Dept: FAMILY MEDICINE CLINIC | Facility: CLINIC | Age: 49
End: 2019-10-28

## 2019-10-28 ENCOUNTER — TELEPHONE (OUTPATIENT)
Dept: ENDOCRINOLOGY | Facility: CLINIC | Age: 49
End: 2019-10-28

## 2020-03-27 ENCOUNTER — TELEPHONE (OUTPATIENT)
Dept: ENDOCRINOLOGY | Facility: CLINIC | Age: 50
End: 2020-03-27

## 2020-03-27 NOTE — TELEPHONE ENCOUNTER
Pt needs refills relion pen needles 31 gauge X 6 mm usually gets 50 count but is running out if can be more would be better .  [phone]    [facility] in [place]

## 2020-06-23 ENCOUNTER — TELEPHONE (OUTPATIENT)
Dept: ENDOCRINOLOGY | Facility: CLINIC | Age: 50
End: 2020-06-23

## 2020-07-08 ENCOUNTER — TELEPHONE (OUTPATIENT)
Dept: ENDOCRINOLOGY | Facility: CLINIC | Age: 50
End: 2020-07-08

## 2020-07-09 DIAGNOSIS — E10.649 TYPE 1 DIABETES MELLITUS WITH HYPOGLYCEMIA UNAWARENESS (HCC): Primary | ICD-10-CM

## 2020-07-09 DIAGNOSIS — E11.59 HYPERTENSION ASSOCIATED WITH DIABETES (HCC): ICD-10-CM

## 2020-07-09 DIAGNOSIS — E78.2 MIXED DIABETIC HYPERLIPIDEMIA ASSOCIATED WITH TYPE 1 DIABETES MELLITUS (HCC): ICD-10-CM

## 2020-07-09 DIAGNOSIS — I15.2 HYPERTENSION ASSOCIATED WITH DIABETES (HCC): ICD-10-CM

## 2020-07-09 DIAGNOSIS — E55.9 VITAMIN D DEFICIENCY: ICD-10-CM

## 2020-07-09 DIAGNOSIS — E10.69 MIXED DIABETIC HYPERLIPIDEMIA ASSOCIATED WITH TYPE 1 DIABETES MELLITUS (HCC): ICD-10-CM

## 2020-08-05 ENCOUNTER — TELEPHONE (OUTPATIENT)
Dept: ENDOCRINOLOGY | Facility: CLINIC | Age: 50
End: 2020-08-05

## 2020-08-05 NOTE — TELEPHONE ENCOUNTER
Pt called stating that she is out of Toujeo, and it is too expensive for her to keep getting. She would like to be called to let her know if we have samples, at [phone].

## 2020-09-17 ENCOUNTER — TELEPHONE (OUTPATIENT)
Dept: ENDOCRINOLOGY | Facility: CLINIC | Age: 50
End: 2020-09-17

## 2020-09-17 NOTE — TELEPHONE ENCOUNTER
Pt called back with more requests.     She wants labs faxed to [facility] in [place], fax [phone], back up fax [phone].     She also wants Insulin Glulisine (APIDRA SOLOSTAR) 100 UNIT/ML solution pen-injector    To be called in to the pharmacy downstairs.

## 2020-09-18 ENCOUNTER — DOCUMENTATION (OUTPATIENT)
Dept: ENDOCRINOLOGY | Facility: CLINIC | Age: 50
End: 2020-09-18

## 2020-09-18 RX ORDER — INSULIN GLULISINE 100 [IU]/ML
INJECTION, SOLUTION SUBCUTANEOUS
Qty: 30 ML | Refills: 11 | OUTPATIENT
Start: 2020-09-18

## 2020-09-18 NOTE — PROGRESS NOTES
I spoke with pt and let her know we could send her in one pen of toujeo to last her till her next apt and then she wouldn't get any more till seen

## 2020-09-18 NOTE — TELEPHONE ENCOUNTER
Pt called again stating that she is needing toujeo samples, she only has enough for one more night.

## 2020-09-18 NOTE — TELEPHONE ENCOUNTER
Pt rx called stating that a PA would be needed to get her Toujeo pen, states that she is going out of town for the weekend and does not have any more pens.

## 2020-09-24 ENCOUNTER — TELEPHONE (OUTPATIENT)
Dept: FAMILY MEDICINE CLINIC | Facility: CLINIC | Age: 50
End: 2020-09-24

## 2020-09-24 ENCOUNTER — TELEMEDICINE (OUTPATIENT)
Dept: ENDOCRINOLOGY | Facility: CLINIC | Age: 50
End: 2020-09-24

## 2020-09-24 DIAGNOSIS — E11.59 HYPERTENSION ASSOCIATED WITH DIABETES (HCC): ICD-10-CM

## 2020-09-24 DIAGNOSIS — E10.69 MIXED DIABETIC HYPERLIPIDEMIA ASSOCIATED WITH TYPE 1 DIABETES MELLITUS (HCC): ICD-10-CM

## 2020-09-24 DIAGNOSIS — E78.2 MIXED DIABETIC HYPERLIPIDEMIA ASSOCIATED WITH TYPE 1 DIABETES MELLITUS (HCC): ICD-10-CM

## 2020-09-24 DIAGNOSIS — E55.9 VITAMIN D DEFICIENCY: ICD-10-CM

## 2020-09-24 DIAGNOSIS — E10.649 TYPE 1 DIABETES MELLITUS WITH HYPOGLYCEMIA UNAWARENESS (HCC): Primary | ICD-10-CM

## 2020-09-24 DIAGNOSIS — I15.2 HYPERTENSION ASSOCIATED WITH DIABETES (HCC): ICD-10-CM

## 2020-09-24 PROCEDURE — 99214 OFFICE O/P EST MOD 30 MIN: CPT | Performed by: INTERNAL MEDICINE

## 2020-09-24 RX ORDER — INSULIN GLULISINE 100 [IU]/ML
INJECTION, SOLUTION SUBCUTANEOUS
Qty: 10 PEN | Refills: 11 | Status: SHIPPED | OUTPATIENT
Start: 2020-09-24 | End: 2021-10-05

## 2020-09-24 RX ORDER — GLUCOSAMINE HCL/CHONDROITIN SU 500-400 MG
CAPSULE ORAL
Qty: 120 EACH | Refills: 11 | Status: SHIPPED | OUTPATIENT
Start: 2020-09-24

## 2020-09-24 RX ORDER — BLOOD SUGAR DIAGNOSTIC
STRIP MISCELLANEOUS
Qty: 200 EACH | Refills: 11 | Status: SHIPPED | OUTPATIENT
Start: 2020-09-24 | End: 2021-10-15 | Stop reason: SDUPTHER

## 2020-09-24 RX ORDER — INSULIN GLULISINE 100 [IU]/ML
INJECTION, SOLUTION SUBCUTANEOUS
Qty: 10 PEN | Refills: 11 | Status: SHIPPED | OUTPATIENT
Start: 2020-09-24 | End: 2020-09-24 | Stop reason: SDUPTHER

## 2020-09-24 RX ORDER — GLUCAGON 3 MG/1
1 POWDER NASAL AS NEEDED
Qty: 2 EACH | Refills: 11 | Status: SHIPPED | OUTPATIENT
Start: 2020-09-24 | End: 2021-10-15

## 2020-09-24 NOTE — PROGRESS NOTES
Amanda Padron is a 50 y.o. female who presents for  evaluation of   Type 1 diabetes                                       This was a Telehealth Encounter. Benefits and Disadvantages of a Telehealth Visit were discussed and accepted by patient. .  Patient agreed to receive service through Telehealth visit as patient is being compliant with social distancing recommendations imparted by CDC.     You have chosen to receive care through a telehealth visit.  Do you consent to use a video/audio connection for your medical care today? Yes          Primary Care / Referring Provider  Naima Connolly APRN    History of Present Illness  Duration/Timing:  Diabetes mellitus type 1, Age at onset of diabetes: 11 years, Onset of symptoms sudden  Timing - constant  Qualtiy - uncontrolled, improved   Severiy - High since there is associated DR.    Severity (Complications/Hospitalizations)  Secondary Macrovascular Complications:  No CAD, No CVA, No PAD  Secondary Microvascular Complications:  Diabetic Nephropathy , Diabetic Retinopathy, No Diabetic Neuropathy      Context  Diabetes Regimen:  Insulin,   Lab Results   Component Value Date    HGBA1C 8.8 09/03/2019       Blood Glucose Readings  both hypo and hyperglycemia but minimized   Diet  variable carb intake  Exercise:  Does not exercise    Associated Signs/Symptoms  Hyperglycemic Symptoms:  No polyuria, No polydipsia, No polyphagia, Blurred vision  Hypoglycemic Episodes:  Documented symptomatic hypoglycemia, Hypoglycemic unawareness, Seizures and syncope related to hypoglycemia      Past Medical History:   Diagnosis Date   • Acute conjunctivitis    • 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HCC)     not controlled due to hypoglycemia      • Diabetic retinopathy (CMS/HCC)    • Dyslipidemia    • Hypertensive disorder    • Vitamin D deficiency      Family History   Problem Relation Age of Onset   • Coronary artery disease Other    • Diabetes Other    • Hypertension Other      Social  "History     Tobacco Use   • Smoking status: Never Smoker   • Smokeless tobacco: Never Used   Substance Use Topics   • Alcohol use: Not on file   • Drug use: Not on file         Current Outpatient Medications:   •  acetaminophen (TYLENOL) 650 MG 8 hr tablet, Take 650 mg by mouth Every 8 (Eight) Hours As Needed for Mild Pain ., Disp: , Rfl:   •  ALPRAZolam (XANAX) 0.5 MG tablet, Take 0.5 mg by mouth 2 (Two) Times a Day As Needed for Anxiety., Disp: , Rfl:   •  atorvastatin (LIPITOR) 40 MG tablet, Take 60 mg by mouth daily., Disp: , Rfl:   •  BD INSULIN SYRINGE ULTRAFINE 31G X 15/64\" 0.5 ML misc, INJECT INSULIN 5 TIMES DAILY AS DIRECTED, Disp: 200 each, Rfl: 11  •  buPROPion (WELLBUTRIN) 100 MG tablet, Take 100 mg by mouth 2 (Two) Times a Day., Disp: , Rfl:   •  furosemide (LASIX) 40 MG tablet, Take 20 mg by mouth Every Other Day., Disp: , Rfl:   •  gabapentin (NEURONTIN) 100 MG capsule, Take 100 mg by mouth Daily., Disp: , Rfl:   •  glucagon (GLUCAGON EMERGENCY) 1 MG injection, Inject  under the skin. Use as directed., Disp: , Rfl:   •  glucose blood (ACCU-CHEK CELSO PLUS) test strip, 1 each by Other route 4 (four) times a day. Use as instructed, Disp: , Rfl:   •  HUMALOG KWIKPEN 200 UNIT/ML solution pen-injector, INJECT 40 UNITS SUBCUTANEOUSLY THREE TIMES DAILY WITH MEALS, Disp: 5 pen, Rfl: 5  •  hydrALAZINE (APRESOLINE) 25 MG tablet, Take 50 mg by mouth 3 (Three) Times a Day., Disp: , Rfl:   •  Insulin Glargine, 1 Unit Dial, (TOUJEO) 300 UNIT/ML solution pen-injector injection, Inject 30 Units under the skin into the appropriate area as directed Daily., Disp: 1 pen, Rfl: 0  •  Insulin Glulisine (APIDRA SOLOSTAR) 100 UNIT/ML solution pen-injector, Up to 35 units with meals, Disp: 10 pen, Rfl: 11  •  Insulin Pen Needle (Advocate Insulin Pen Needles) 31G X 5 MM misc, Use to inject insulin 4 times per day, Disp: 200 each, Rfl: 11  •  Insulin Syringe-Needle U-100 (B-D INS SYRINGE 0.5CC/31GX5/16) 31G X 5/16\" 0.5 ML misc, " "5 (five) times a day., Disp: , Rfl:   •  Insulin Syringe-Needle U-100 (B-D INSULIN SYRINGE 1CC/27G) 27G X 1/2\" 1 ML misc, 4 (four) times a day., Disp: , Rfl:   •  Lancets Misc. (ACCU-CHEK MULTICLIX LANCET DEV) kit, Provide lancing device and 5 lancets per day use as needed, Disp: 150 each, Rfl: 11  •  lisinopril (PRINIVIL,ZESTRIL) 10 MG tablet, Take 10 mg by mouth daily., Disp: , Rfl:   •  Loratadine (CLARITIN PO), Take 10 mg by mouth Daily., Disp: , Rfl:   •  raNITIdine (ZANTAC) 150 MG tablet, Take 150 mg by mouth 2 (Two) Times a Day., Disp: , Rfl:     Review of Systems    Review of Systems   Constitutional: Negative for activity change, appetite change, chills, diaphoresis, fatigue, fever and unexpected weight change.   HENT: Negative for congestion, drooling, ear discharge, ear pain, facial swelling, mouth sores, nosebleeds, postnasal drip, sinus pressure, sneezing, sore throat, tinnitus, trouble swallowing and voice change.    Eyes: Negative.  Negative for photophobia, pain, discharge, redness and itching.   Respiratory: Negative.  Negative for apnea, cough, choking, chest tightness, shortness of breath, wheezing and stridor.    Cardiovascular: Negative.  Negative for chest pain, palpitations and leg swelling.   Gastrointestinal: Negative.  Negative for abdominal distention, abdominal pain, constipation, diarrhea, nausea and vomiting.   Endocrine: Negative.  Negative for cold intolerance, heat intolerance, polydipsia, polyphagia and polyuria.   Genitourinary: Negative for difficulty urinating, dysuria, flank pain and frequency.   Musculoskeletal: Negative for arthralgias, back pain, gait problem, joint swelling, myalgias, neck pain and neck stiffness.   Skin: Negative for color change, pallor, rash and wound.   Allergic/Immunologic: Negative for environmental allergies, food allergies and immunocompromised state.   Neurological: Negative for dizziness, tremors, seizures, syncope, facial asymmetry, speech " difficulty, weakness, light-headedness, numbness and headaches.   Hematological: Negative for adenopathy. Does not bruise/bleed easily.   Psychiatric/Behavioral: Negative for agitation, behavioral problems, confusion, decreased concentration, dysphoric mood, hallucinations, self-injury, sleep disturbance and suicidal ideas. The patient is not nervous/anxious and is not hyperactive.         Objective:     There were no vitals taken for this visit.    Physical Exam   Constitutional: She is oriented to person, place, and time. She appears well-developed.   HENT:   Head: Normocephalic.   Right Ear: External ear normal.   Left Ear: External ear normal.   Nose: Nose normal.   Eyes: Conjunctivae are normal. No scleral icterus.   Neck: Normal range of motion. Neck supple. No tracheal deviation present. No thyromegaly present.   Cardiovascular: Normal rate and regular rhythm. Exam reveals no gallop and no friction rub.   Murmur heard.  Diastolic murmur    Pulmonary/Chest: Effort normal and breath sounds normal. No stridor. No respiratory distress. She has no wheezes. She has no rales. She exhibits no tenderness.   Abdominal: Soft. Bowel sounds are normal. She exhibits no distension and no mass. There is no abdominal tenderness. There is no rebound and no guarding.   Musculoskeletal: Normal range of motion. No tenderness or deformity.   Lymphadenopathy:     She has no cervical adenopathy.   Neurological: She is alert and oriented to person, place, and time. She displays normal reflexes. She exhibits normal muscle tone. Coordination normal.   Skin: No rash noted. No erythema. No pallor.   Psychiatric: Her behavior is normal. Judgment and thought content normal.       Lab Review            Assessment/Plan       ICD-10-CM ICD-9-CM   1. 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 Hypertension associated with diabetes (CMS/[facility])  E11.59 250.80    I10 401.9   3. Mixed diabetic hyperlipidemia  associated with type 1 diabetes mellitus (CMS/[facility])  E10.69 250.81    E78.2 272.2   4. Vitamin D deficiency  E55.9 268.9       Glycemic Mgmt    Lab Results   Component Value Date    HGBA1C 8.8 09/03/2019    HGBA1C 8.3 (H) 10/16/2018    HGBA1C 7.9 (H) 03/09/2018     Lab Results   Component Value Date    MICROALBUR 2.8 10/16/2018    CREATININE 1.25 (H) 10/16/2018     Can't use lantus or basaglar, profound hypoglycemia and seizures    No carb counting    She gives insulin based on experience and doesn't like change so she is still using syringe and vials    Toujeo , 16 to 20  units at night     Apidra  15 with meals but she adjusts between 10 and 18  It seems that she does a carb ratio of 5 and I suggested to try it and self titration explained.  I sugggested sensitivity of 40       I suggested sensor, she will think about it.                                                                                                                                                                                                                                                                                                                                                                                                                                                                                                                                                       Lipid Management    Lab Results   Component Value Date    CHOL 154 10/16/2018    CHOL 171 03/09/2018    CHOL 140 05/25/2017     Lab Results   Component Value Date    TRIG 96 10/16/2018    TRIG 112 03/09/2018    TRIG 66 05/25/2017     Lab Results   Component Value Date    HDL 65 10/16/2018    HDL 59 (L) 03/09/2018    HDL 64 05/25/2017       Lab Results   Component Value Date    LDL 60 10/16/2018    LDL 72 03/09/2018    LDL 60 05/25/2017         Lipids at goal     continue     Lipitor 40 mg tablet, 1 1/2 tabs daily   Blood Pressure Management:  Target blood pressure less than  130/80 mm/hg, On Ace Inhibitor/ARB, Control of blood pressure    There were no vitals filed for this visit.    Now managed by Moris      Microvascular Complication Monitoring:   No Diabetic Neuropathy  following with Dr. Moore in [place] has DR    Now following w nephrology     On lasix 20 mg qod - changed to 10 mg qod   On lisinopril 10 mg daily  ( from 20 mg qd ) decided by Moris    not taking NSAIDS, bp well controlled, diabetes needs to improve     no neuropathy - has plantar fasciitis   Immunizations:  Patient prefers not to receive influenza immunization, Patient prefers not to receive pneumococcal immunization  Preventive Care:  Patient is not smoking  Weight Related:  Obesity, Counseled on nutrition, Counseled on physical activity  Bone Health  Vit D Def  can't tolerate Vit D3 but can tolerate vit D2    On 50 th u every weeky again     Lab Results   Component Value Date    QLTR11IB 49.3 10/16/2018    RLLC59AZ 25.6 (L) 03/09/2018    DLOT27NB 46.0 05/25/2017           Other Diabetes Related Aspects    Lab Results   Component Value Date    TSH 0.900 10/16/2018     Lab Results   Component Value Date    ICXVFBPA03 962 (H) 10/16/2018   neg celiac panel in  2016     Murmur , asymptomatic, diastolic to my impression , follow clinically     I reviewed and summarized records from Naima Connolly APRN from present year  and I reviewed / ordered labs.     No orders of the defined types were placed in this encounter.        A copy of my note was sent to Naima Connolly APRN    Please see my above opinion and suggestions.     I spent 15 minutes reviewing patient electronic chart , reviewing medications , past history , active problems.   I provided advice regarding management of medical conditions, refilled prescriptions , ordered labs and arranged for future appointment.   Patient was advised to contact us if there were any unanswered questions or ongoing concerns.

## 2020-09-29 ENCOUNTER — TELEPHONE (OUTPATIENT)
Dept: FAMILY MEDICINE CLINIC | Facility: CLINIC | Age: 50
End: 2020-09-29

## 2021-01-20 ENCOUNTER — TELEPHONE (OUTPATIENT)
Dept: ENDOCRINOLOGY | Facility: CLINIC | Age: 51
End: 2021-01-20

## 2021-01-20 NOTE — TELEPHONE ENCOUNTER
PA FOR APIDRA SOLOSTAR WAS SUBMITTED TO PHARMACY BENEFITS VIA [facility]

## 2021-01-20 NOTE — TELEPHONE ENCOUNTER
THE DRUG APIDRA SOLOSTAR 100UNIT/ML PEN-INJECTORS WAS SUBMITTED VIA [facility] ON 09/29/2020 AND WAS APPROVED.    APPROVAL 09/29/2020 THROUGH 09/29/2021    REF # [id number]    COPY OF APPROVAL IN PATIENT CHART

## 2021-06-30 ENCOUNTER — TELEPHONE (OUTPATIENT)
Dept: ENDOCRINOLOGY | Facility: CLINIC | Age: 51
End: 2021-06-30

## 2021-06-30 NOTE — TELEPHONE ENCOUNTER
SPOKE TO PHARMACY - SHE HAS DIFFERENT INSURANCE. CALLED PT EXPLAINED - SENT PA VIA [facility]  Amanda Padron (Johnson: [id number]) - [id number]  Apidra SoloStar 100UNIT/ML pen-injectors       Status: PA Request - SENT    INSTRUCTED PT TO CALL [facility] - INSURANCE ENDING TONIGHT - TO GET EXPEDITED.

## 2021-06-30 NOTE — TELEPHONE ENCOUNTER
Pt called stating that [place] pharmacy told her that her Apidra is requiring a PA.     She is wanting this done today so that she can get it at no charge.     A PA was done in January and says it is approved through Sept 29, 2021. She is wanting to know if we can tell the pharmacy this.

## 2021-07-16 ENCOUNTER — DOCUMENTATION (OUTPATIENT)
Dept: ENDOCRINOLOGY | Facility: CLINIC | Age: 51
End: 2021-07-16

## 2021-07-16 NOTE — PROGRESS NOTES
Patient is out of Apidra insulin. We do not have samples so Humalog Kwikpen sample u100mg/ml is given to patient.

## 2021-08-19 ENCOUNTER — TELEPHONE (OUTPATIENT)
Dept: ENDOCRINOLOGY | Facility: CLINIC | Age: 51
End: 2021-08-19

## 2021-09-01 ENCOUNTER — DOCUMENTATION (OUTPATIENT)
Dept: ENDOCRINOLOGY | Facility: CLINIC | Age: 51
End: 2021-09-01

## 2021-09-01 RX ORDER — INSULIN LISPRO 100 [IU]/ML
INJECTION, SOLUTION INTRAVENOUS; SUBCUTANEOUS
Qty: 6 PEN | Refills: 11 | Status: SHIPPED | OUTPATIENT
Start: 2021-09-01 | End: 2022-05-11

## 2021-09-01 NOTE — PROGRESS NOTES
[facility]     [facility]OVAL PEND[facility]     KEY: [id number]

## 2021-09-27 ENCOUNTER — DOCUMENTATION (OUTPATIENT)
Dept: ENDOCRINOLOGY | Facility: CLINIC | Age: 51
End: 2021-09-27

## 2021-09-27 DIAGNOSIS — E11.59 HYPERTENSION ASSOCIATED WITH DIABETES (HCC): ICD-10-CM

## 2021-09-27 DIAGNOSIS — E55.9 VITAMIN D DEFICIENCY: ICD-10-CM

## 2021-09-27 DIAGNOSIS — I15.2 HYPERTENSION ASSOCIATED WITH DIABETES (HCC): ICD-10-CM

## 2021-09-27 DIAGNOSIS — E78.2 MIXED DIABETIC HYPERLIPIDEMIA ASSOCIATED WITH TYPE 1 DIABETES MELLITUS (HCC): ICD-10-CM

## 2021-09-27 DIAGNOSIS — E10.649 TYPE 1 DIABETES MELLITUS WITH HYPOGLYCEMIA UNAWARENESS (HCC): Primary | ICD-10-CM

## 2021-09-27 DIAGNOSIS — E10.69 MIXED DIABETIC HYPERLIPIDEMIA ASSOCIATED WITH TYPE 1 DIABETES MELLITUS (HCC): ICD-10-CM

## 2021-10-05 ENCOUNTER — OFFICE VISIT (OUTPATIENT)
Dept: ENDOCRINOLOGY | Facility: CLINIC | Age: 51
End: 2021-10-05

## 2021-10-05 VITALS
SYSTOLIC BLOOD PRESSURE: 140 MMHG | WEIGHT: 178 LBS | DIASTOLIC BLOOD PRESSURE: 70 MMHG | BODY MASS INDEX: 30.39 KG/M2 | HEIGHT: 64 IN | HEART RATE: 96 BPM | OXYGEN SATURATION: 97 %

## 2021-10-05 DIAGNOSIS — I15.2 HYPERTENSION ASSOCIATED WITH DIABETES (HCC): ICD-10-CM

## 2021-10-05 DIAGNOSIS — E10.69 MIXED DIABETIC HYPERLIPIDEMIA ASSOCIATED WITH TYPE 1 DIABETES MELLITUS (HCC): ICD-10-CM

## 2021-10-05 DIAGNOSIS — E11.59 HYPERTENSION ASSOCIATED WITH DIABETES (HCC): ICD-10-CM

## 2021-10-05 DIAGNOSIS — E10.649 TYPE 1 DIABETES MELLITUS WITH HYPOGLYCEMIA UNAWARENESS (HCC): Primary | ICD-10-CM

## 2021-10-05 DIAGNOSIS — E55.9 VITAMIN D DEFICIENCY: ICD-10-CM

## 2021-10-05 DIAGNOSIS — N18.32 CHRONIC RENAL FAILURE, STAGE 3B (HCC): ICD-10-CM

## 2021-10-05 DIAGNOSIS — E23.0 HYPOGONADOTROPIC HYPOGONADISM (HCC): ICD-10-CM

## 2021-10-05 DIAGNOSIS — E78.2 MIXED DIABETIC HYPERLIPIDEMIA ASSOCIATED WITH TYPE 1 DIABETES MELLITUS (HCC): ICD-10-CM

## 2021-10-05 PROCEDURE — 99214 OFFICE O/P EST MOD 30 MIN: CPT | Performed by: INTERNAL MEDICINE

## 2021-10-05 RX ORDER — CETIRIZINE HYDROCHLORIDE 10 MG/1
10 TABLET ORAL DAILY
COMMUNITY

## 2021-10-05 RX ORDER — SERTRALINE HYDROCHLORIDE 100 MG/1
100 TABLET, FILM COATED ORAL 2 TIMES DAILY
COMMUNITY

## 2021-10-05 RX ORDER — TIZANIDINE 4 MG/1
4 TABLET ORAL NIGHTLY PRN
COMMUNITY

## 2021-10-05 RX ORDER — BUSPIRONE HYDROCHLORIDE 5 MG/1
5 TABLET ORAL 2 TIMES DAILY
COMMUNITY

## 2021-10-05 RX ORDER — FUROSEMIDE 20 MG/1
20 TABLET ORAL DAILY
Qty: 30 TABLET | Refills: 11 | Status: SHIPPED | OUTPATIENT
Start: 2021-10-05 | End: 2022-05-11

## 2021-10-05 RX ORDER — LISINOPRIL 10 MG/1
10 TABLET ORAL DAILY
Qty: 30 TABLET | Refills: 11 | Status: SHIPPED | OUTPATIENT
Start: 2021-10-05 | End: 2023-03-14 | Stop reason: SDUPTHER

## 2021-10-05 NOTE — PROGRESS NOTES
" Amanda Padron is a 51 y.o. female who presents for  evaluation of   Type 1 diabetes         Primary Care / Referring Provider  Naima Connolly APRN    History of Present Illness  Duration/Timing:  Diabetes mellitus type 1, Age at onset of diabetes: 11 years    Severity (Complications/Hospitalizations)  Secondary Macrovascular Complications:  No CAD, No CVA, No PAD  Secondary Microvascular Complications:  Diabetic Nephropathy , Diabetic Retinopathy, No Diabetic Neuropathy      Blood Glucose Readings  both hypo and hyperglycemia    Diet  variable carb intake  Exercise:  Does not exercise    Associated Signs/Symptoms  Hyperglycemic Symptoms:  No polyuria, No polydipsia, No polyphagia, Blurred vision  Hypoglycemic Episodes:  Documented symptomatic hypoglycemia, Hypoglycemic unawareness, Seizures and syncope related to hypoglycemia      Doesn't want sensor or pump    PE    /70   Pulse 96   Ht 162.6 cm (64\")   Wt 80.7 kg (178 lb)   SpO2 97%   BMI 30.55 kg/m²   AOx3  No visible goiter  Normal Respiratory Effort , Lung CTA  RRR  Diastolic murmur  No Edema    Labs  See below             Assessment/Plan       ICD-10-CM ICD-9-CM   1. 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 Hypertension associated with diabetes (HCC)  E11.59 250.80    I15.2 401.9   3. Mixed diabetic hyperlipidemia associated with type 1 diabetes mellitus ([facility])  E10.69 250.81    E78.2 272.2   4. Vitamin D deficiency  E55.9 268.9   5. Chronic renal failure, stage 3b ([facility])  N18.32 585.3       Glycemic Mgmt    Lab Results   Component Value Date    HGBA1C 8.8 09/03/2019    HGBA1C 8.3 (H) 10/16/2018    HGBA1C 7.9 (H) 03/09/2018     Lab Results   Component Value Date    MICROALBUR 2.8 10/16/2018    CREATININE 1.25 (H) 10/16/2018     Can't use lantus or basaglar, profound hypoglycemia and seizures    No carb counting    She gives insulin based on experience and doesn't like change so she is still using " syringe and vials    Toujeo , 16 to 20  units at night     Humalog   15 with meals but she adjusts between 10 and 18  It seems that she does a carb ratio of 5 and I suggested to try it and self titration explained.  I sugggested sensitivity of 40       I suggested sensor, she will think about it.      I tried again                                                                                                                                                                                                                                                                                                                                                                                                                                                                                                                                                      Lipid Management    Sept 2021    LDL 63  HDL 68    Lipitor 40 mg tablet, 1 1/2 tabs daily --- decrease to 40       Blood Pressure Management:       Vitals:    10/05/21 0835   BP: 140/70   Pulse: 96   SpO2: 97%           Microvascular Complication Monitoring:       No Diabetic Neuropathy  States takes neurontin for plantar fasciitis ?    following with Dr. Moore in [place] has DR    John Subramanian before but insurance changed     On lasix 20 mg qod - changed to 10 mg qod   On lisinopril 10 mg daily  ( from 20 mg qd ) decided by Moris    I will keep this regimen     Monitor PTH and Red Cell count   If decline in GFR or development of anemia, hyperparathyroidism refer back     Sept 2021 , GFR 41   Sept 2021 alb / creat ratio 13     Immunizations:  Patient prefers not to receive influenza immunization, Patient prefers not to receive pneumococcal immunization  Didn't take COVID vaccine    Preventive Care:  Patient is not smoking    Bone Health    Vit D Def  can't tolerate Vit D3 but can tolerate vit D2    On 50 th u every weeky in the past and levels became high    Off  treatment  Vit D from Sept 2021 is 30     Other    neg celiac panel in  2016   Thyroid nl from Sept 2021  B12 elevated    Murmur , asymptomatic, diastolic to my impression , follow clinically

## 2021-10-06 DIAGNOSIS — E10.649 TYPE 1 DIABETES MELLITUS WITH HYPOGLYCEMIA UNAWARENESS (HCC): ICD-10-CM

## 2021-10-06 DIAGNOSIS — I15.2 HYPERTENSION ASSOCIATED WITH DIABETES (HCC): ICD-10-CM

## 2021-10-06 DIAGNOSIS — E55.9 VITAMIN D DEFICIENCY: ICD-10-CM

## 2021-10-06 DIAGNOSIS — E10.69 MIXED DIABETIC HYPERLIPIDEMIA ASSOCIATED WITH TYPE 1 DIABETES MELLITUS (HCC): ICD-10-CM

## 2021-10-06 DIAGNOSIS — E11.59 HYPERTENSION ASSOCIATED WITH DIABETES (HCC): ICD-10-CM

## 2021-10-06 DIAGNOSIS — E78.2 MIXED DIABETIC HYPERLIPIDEMIA ASSOCIATED WITH TYPE 1 DIABETES MELLITUS (HCC): ICD-10-CM

## 2021-10-12 RX ORDER — PEN NEEDLE, DIABETIC 32GX 5/32"
NEEDLE, DISPOSABLE MISCELLANEOUS
Qty: 100 EACH | Refills: 0 | Status: CANCELLED | OUTPATIENT
Start: 2021-10-12

## 2021-10-12 NOTE — TELEPHONE ENCOUNTER
Pt called for pen needles prescription. Please send to [facility] and call pt when sent.

## 2021-10-15 ENCOUNTER — TELEPHONE (OUTPATIENT)
Dept: ENDOCRINOLOGY | Facility: CLINIC | Age: 51
End: 2021-10-15

## 2021-10-15 RX ORDER — BLOOD SUGAR DIAGNOSTIC
STRIP MISCELLANEOUS
Qty: 200 EACH | Refills: 11 | Status: SHIPPED | OUTPATIENT
Start: 2021-10-15 | End: 2022-10-17

## 2021-10-15 NOTE — TELEPHONE ENCOUNTER
PT calls and needs Insulin Pen Needle (Advocate Insulin Pen Needles) 31G X 5 MM miscInsulin Pen Needle (Advocate Insulin Pen Needles) 31G X 5 MM misc sent to [facility] in [place]. Needs asap only has a few left. Thank you.

## 2021-10-27 RX ORDER — PEN NEEDLE, DIABETIC 31 GX5/16"
NEEDLE, DISPOSABLE MISCELLANEOUS
Qty: 120 EACH | Refills: 11 | Status: SHIPPED | OUTPATIENT
Start: 2021-10-27

## 2021-11-01 ENCOUNTER — TELEPHONE (OUTPATIENT)
Dept: ENDOCRINOLOGY | Facility: CLINIC | Age: 51
End: 2021-11-01

## 2021-11-18 ENCOUNTER — TELEPHONE (OUTPATIENT)
Dept: ENDOCRINOLOGY | Facility: CLINIC | Age: 51
End: 2021-11-18

## 2021-11-18 NOTE — TELEPHONE ENCOUNTER
Pt called and [facility]  does not have the FS sensors in stock so we would need to send it to another place

## 2022-01-19 ENCOUNTER — TELEPHONE (OUTPATIENT)
Dept: ENDOCRINOLOGY | Facility: CLINIC | Age: 52
End: 2022-01-19

## 2022-01-19 NOTE — TELEPHONE ENCOUNTER
Pt said Dr. James was working on getting her Freestyle Marquis 2 covered on insurance. She now has new insurance and will be scanning a copy in her mychart so I can get it added. She also wants to know if we have samples of the sensors to get her through until this issue is resolved. Thank you

## 2022-01-21 ENCOUNTER — TELEPHONE (OUTPATIENT)
Dept: ENDOCRINOLOGY | Facility: CLINIC | Age: 52
End: 2022-01-21

## 2022-01-21 NOTE — TELEPHONE ENCOUNTER
Pt is needing more freestyle rashad 2 sample sensors.   She stated that she picked up 2 last week and one had  in Dec of 21 and did not work.

## 2022-01-24 DIAGNOSIS — E10.649 TYPE 1 DIABETES MELLITUS WITH HYPOGLYCEMIA UNAWARENESS: Primary | ICD-10-CM

## 2022-02-08 ENCOUNTER — OFFICE VISIT (OUTPATIENT)
Dept: ENDOCRINOLOGY | Facility: CLINIC | Age: 52
End: 2022-02-08

## 2022-02-08 VITALS
HEIGHT: 64 IN | HEART RATE: 91 BPM | SYSTOLIC BLOOD PRESSURE: 110 MMHG | BODY MASS INDEX: 30.05 KG/M2 | OXYGEN SATURATION: 97 % | DIASTOLIC BLOOD PRESSURE: 70 MMHG | WEIGHT: 176 LBS

## 2022-02-08 DIAGNOSIS — E78.2 MIXED DIABETIC HYPERLIPIDEMIA ASSOCIATED WITH TYPE 1 DIABETES MELLITUS: ICD-10-CM

## 2022-02-08 DIAGNOSIS — E11.59 HYPERTENSION ASSOCIATED WITH DIABETES: ICD-10-CM

## 2022-02-08 DIAGNOSIS — E55.9 VITAMIN D DEFICIENCY: ICD-10-CM

## 2022-02-08 DIAGNOSIS — I38 DIASTOLIC MURMUR: ICD-10-CM

## 2022-02-08 DIAGNOSIS — I15.2 HYPERTENSION ASSOCIATED WITH DIABETES: ICD-10-CM

## 2022-02-08 DIAGNOSIS — E10.69 MIXED DIABETIC HYPERLIPIDEMIA ASSOCIATED WITH TYPE 1 DIABETES MELLITUS: ICD-10-CM

## 2022-02-08 DIAGNOSIS — E10.649 TYPE 1 DIABETES MELLITUS WITH HYPOGLYCEMIA UNAWARENESS: Primary | ICD-10-CM

## 2022-02-08 PROCEDURE — 99214 OFFICE O/P EST MOD 30 MIN: CPT | Performed by: INTERNAL MEDICINE

## 2022-02-08 PROCEDURE — 95251 CONT GLUC MNTR ANALYSIS I&R: CPT | Performed by: INTERNAL MEDICINE

## 2022-02-08 RX ORDER — PROCHLORPERAZINE 25 MG/1
SUPPOSITORY RECTAL AS NEEDED
Qty: 9 EACH | Refills: 3 | Status: ON HOLD | OUTPATIENT
Start: 2022-02-08 | End: 2022-10-20

## 2022-02-08 RX ORDER — GLUCAGON INJECTION, SOLUTION 1 MG/.2ML
1 INJECTION, SOLUTION SUBCUTANEOUS AS NEEDED
Qty: 0.4 ML | Refills: 1 | Status: SHIPPED | OUTPATIENT
Start: 2022-02-08

## 2022-02-08 RX ORDER — PROCHLORPERAZINE 25 MG/1
1 SUPPOSITORY RECTAL ONCE
Qty: 1 EACH | Refills: 1 | Status: SHIPPED | OUTPATIENT
Start: 2022-02-08 | End: 2022-02-08

## 2022-02-08 RX ORDER — PROCHLORPERAZINE 25 MG/1
1 SUPPOSITORY RECTAL ONCE
Qty: 1 EACH | Refills: 3 | Status: SHIPPED | OUTPATIENT
Start: 2022-02-08 | End: 2022-02-08

## 2022-02-08 NOTE — PROGRESS NOTES
" Amanda Padron is a 51 y.o. female who presents for  evaluation of   Type 1 diabetes         Primary Care / Referring Provider  Naima Connolly APRN    History of Present Illness  Duration/Timing:  Diabetes mellitus type 1, Age at onset of diabetes: 11 years    Severity (Complications/Hospitalizations)  Secondary Macrovascular Complications:  No CAD, No CVA, No PAD  Secondary Microvascular Complications:  Diabetic Nephropathy , Diabetic Retinopathy, No Diabetic Neuropathy      Blood Glucose Readings  both hypo and hyperglycemia    Diet  variable carb intake  Exercise:  Does not exercise    Associated Signs/Symptoms  Hyperglycemic Symptoms:  No polyuria, No polydipsia, No polyphagia, Blurred vision  Hypoglycemic Episodes:  Documented symptomatic hypoglycemia, Hypoglycemic unawareness, Seizures and syncope related to hypoglycemia      Doesn't want sensor or pump    PE    /70   Pulse 91   Ht 162.6 cm (64\")   Wt 79.8 kg (176 lb)   SpO2 97%   BMI 30.21 kg/m²   AOx3  No visible goiter  Normal Respiratory Effort , Lung CTA  RRR  Diastolic murmur  No Edema    Labs      Lab Results   Component Value Date    WBC 5.62 10/16/2018    HGB 13.2 10/16/2018    HCT 39.6 10/16/2018    MCV 86.7 10/16/2018     10/16/2018     Lab Results   Component Value Date    GLUCOSE 154 (H) 10/16/2018    BUN 30 (H) 10/16/2018    CREATININE 1.25 (H) 10/16/2018    EGFRIFNONA 45 (L) 10/16/2018    BCR 24.0 10/16/2018    K 4.2 10/16/2018    CO2 23.0 10/16/2018    CALCIUM 9.7 10/16/2018    ALBUMIN 4.50 10/16/2018    AST 28 10/16/2018    ALT 31 10/16/2018                 Assessment/Plan       ICD-10-CM ICD-9-CM   1. 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 Hypertension associated with diabetes (HCC)  E11.59 250.80    I15.2 401.9   3. Mixed diabetic hyperlipidemia associated with type 1 diabetes mellitus (HCC)  E10.69 250.81    E78.2 272.2       Glycemic Mgmt    Lab Results   Component " Value Date    HGBA1C 8.8 09/03/2019    HGBA1C 8.3 (H) 10/16/2018    HGBA1C 7.9 (H) 03/09/2018     Lab Results   Component Value Date    MICROALBUR 2.8 10/16/2018    CREATININE 1.25 (H) 10/16/2018     Can't use lantus or basaglar, profound hypoglycemia and seizures    No carb counting    She gives insulin based on experience and doesn't like change so she is still using syringe and vials    Has been using rashad 2 since last time  From Jan 26 to Feb 8, 2022    In range 46%, lows 23% but at least she can now see the readings and she is alerted  Before syncope        Toujeo , 16 to 20  units at night keep at only 15     Humalog   15 with meals but she adjusts between 10 and 18  It seems that she does a carb ratio of 5 and I suggested to try it and self titration explained.  I sugggested sensitivity of 40                                                                                                                                                                                                                                                                                                                                                                                                                                                                                                                                                            Lipid Management    Sept 2021    LDL 63  HDL 68    Lipitor 40 mg tablet, 1 1/2 tabs daily --- decrease to 40       Blood Pressure Management:       Vitals:    02/08/22 0941   BP: 110/70   Pulse: 91   SpO2: 97%           Microvascular Complication Monitoring:       No Diabetic Neuropathy  States takes neurontin for plantar fasciitis ?    following with Dr. Moore in [place] has DR John Subramanian before but insurance changed     On lasix 20 mg qod - changed to 10 mg qod   On lisinopril 10 mg daily  ( from 20 mg qd ) decided by Moris    I will keep this regimen     Monitor PTH and Red  Cell count   If decline in GFR or development of anemia, hyperparathyroidism refer back     Sept 2021 , GFR 41   Sept 2021 alb / creat ratio 13     Immunizations:  Patient prefers not to receive influenza immunization, Patient prefers not to receive pneumococcal immunization  Didn't take COVID vaccine    Preventive Care:  Patient is not smoking    Bone Health    Vit D Def  can't tolerate Vit D3 but can tolerate vit D2    On 50 th u every weeky in the past and levels became high    Off treatment  Vit D from Sept 2021 is 30     Other    neg celiac panel in  2016   Thyroid nl from Sept 2021  B12 elevated    Murmur , asymptomatic, diastolic to my impression , follow clinically

## 2022-02-14 ENCOUNTER — DOCUMENTATION (OUTPATIENT)
Dept: ENDOCRINOLOGY | Facility: CLINIC | Age: 52
End: 2022-02-14

## 2022-03-08 ENCOUNTER — DOCUMENTATION (OUTPATIENT)
Dept: ENDOCRINOLOGY | Facility: CLINIC | Age: 52
End: 2022-03-08

## 2022-03-24 ENCOUNTER — TELEPHONE (OUTPATIENT)
Dept: ENDOCRINOLOGY | Facility: CLINIC | Age: 52
End: 2022-03-24

## 2022-03-24 NOTE — TELEPHONE ENCOUNTER
Pt says she can not afford the omnipod. She was on the freestyle rashad but had issues with that. She would like to know her options. Thank you

## 2022-03-25 NOTE — TELEPHONE ENCOUNTER
Patient has to meet her deductible before she can get the Omnipod and Dexcom was not covered because she just started Marquis 2. Patient will continue injections and use the Marquis 2

## 2022-04-12 ENCOUNTER — TELEPHONE (OUTPATIENT)
Dept: ENDOCRINOLOGY | Facility: CLINIC | Age: 52
End: 2022-04-12

## 2022-04-12 NOTE — TELEPHONE ENCOUNTER
Pt insurance is not wanting to cover Toujeo anymore, they do cover Basaglar (pt has already tried and it was sending her into lows), Levemir or Tresiba. PT HAS 3 DAYS LEFT OF TOUJEO, PLEASE SEND NEW SCRIPT TO [facility] IN [place].      Thanks

## 2022-04-20 ENCOUNTER — DOCUMENTATION (OUTPATIENT)
Dept: ENDOCRINOLOGY | Facility: CLINIC | Age: 52
End: 2022-04-20

## 2022-04-20 ENCOUNTER — TELEPHONE (OUTPATIENT)
Dept: ENDOCRINOLOGY | Facility: CLINIC | Age: 52
End: 2022-04-20

## 2022-04-20 NOTE — TELEPHONE ENCOUNTER
Pt is having severe lows since starting tresiba on Friday. Pt has taken two glucagon shots. Sugars were 23 on Monday and 34 this morning. Please call pt as soon as possible. Thank you

## 2022-04-20 NOTE — PROGRESS NOTES
SPOKE WITH PT ABOUT EXPERIENCING LOWS WHEN TAKING HER TRESIBA, SHE WAS TAKING 20 UNITS AT NIGHT THEN WENT TO 18 AND IS STILL HAVING ISSUES.     I ADVISED PT TO LOWER HER LONG ACTING INSULIN DOSE TO 15 UNITS AND MONITOR HER SUGARS, SHE IF IS STILL HAVING LOWS SHE NEEDS TO CALL ME AGAIN SO WE CAN LOWER THE DOSE AGAIN TO GET HER TO WHERE SHE NEEDS TO BE TO CONTROL HER SUGARS.     PT EXPRESSED VERBAL UNDERSTANDING TO NEW DIRECTIONS.

## 2022-04-22 ENCOUNTER — TELEPHONE (OUTPATIENT)
Dept: ENDOCRINOLOGY | Facility: CLINIC | Age: 52
End: 2022-04-22

## 2022-04-22 NOTE — TELEPHONE ENCOUNTER
Pt says her sugar has been between 200 and 350 for the last couple of mornings. She increased Tresiba to 15, then 17 and then to 20 last night (4/21). Pt states her log this morning as follows  5:10AM- 355  5:50AM- 306  7:50AM-215  9:00AM- 184  Pt has not taken any Tresiba this morning yet and would like a call back concerning these high morning levels.       Thanks

## 2022-04-25 RX ORDER — INSULIN GLARGINE 300 U/ML
INJECTION, SOLUTION SUBCUTANEOUS
Qty: 3 PEN | Refills: 3 | Status: SHIPPED | OUTPATIENT
Start: 2022-04-25 | End: 2022-05-11

## 2022-05-05 ENCOUNTER — APPOINTMENT (OUTPATIENT)
Dept: GENERAL RADIOLOGY | Facility: HOSPITAL | Age: 52
End: 2022-05-05

## 2022-05-05 ENCOUNTER — HOSPITAL ENCOUNTER (OUTPATIENT)
Facility: HOSPITAL | Age: 52
Setting detail: OBSERVATION
Discharge: HOME | End: 2022-05-07
Attending: EMERGENCY MEDICINE | Admitting: INTERNAL MEDICINE

## 2022-05-05 DIAGNOSIS — S82.832A CLOSED FRACTURE OF PROXIMAL END OF LEFT FIBULA, UNSPECIFIED FRACTURE MORPHOLOGY, INITIAL ENCOUNTER: ICD-10-CM

## 2022-05-05 DIAGNOSIS — E10.649 TYPE 1 DIABETES MELLITUS WITH HYPOGLYCEMIA UNAWARENESS: ICD-10-CM

## 2022-05-05 DIAGNOSIS — S82.851A CLOSED TRIMALLEOLAR FRACTURE OF RIGHT ANKLE, INITIAL ENCOUNTER: Primary | ICD-10-CM

## 2022-05-05 DIAGNOSIS — E11.59 HYPERTENSION ASSOCIATED WITH DIABETES: ICD-10-CM

## 2022-05-05 DIAGNOSIS — E10.21 DIABETIC NEPHROPATHY ASSOCIATED WITH TYPE 1 DIABETES MELLITUS: ICD-10-CM

## 2022-05-05 DIAGNOSIS — E10.3413 SEVERE NONPROLIFERATIVE DIABETIC RETINOPATHY OF BOTH EYES WITH MACULAR EDEMA ASSOCIATED WITH TYPE 1 DIABETES MELLITUS: ICD-10-CM

## 2022-05-05 DIAGNOSIS — Z78.9 IMPAIRED MOBILITY AND ACTIVITIES OF DAILY LIVING: ICD-10-CM

## 2022-05-05 DIAGNOSIS — E10.69 MIXED DIABETIC HYPERLIPIDEMIA ASSOCIATED WITH TYPE 1 DIABETES MELLITUS: ICD-10-CM

## 2022-05-05 DIAGNOSIS — E78.2 MIXED DIABETIC HYPERLIPIDEMIA ASSOCIATED WITH TYPE 1 DIABETES MELLITUS: ICD-10-CM

## 2022-05-05 DIAGNOSIS — S82.862A CLOSED DISPLACED MAISONNEUVE FRACTURE OF LEFT LOWER EXTREMITY, INITIAL ENCOUNTER: ICD-10-CM

## 2022-05-05 DIAGNOSIS — Z74.09 IMPAIRED MOBILITY AND ACTIVITIES OF DAILY LIVING: ICD-10-CM

## 2022-05-05 DIAGNOSIS — R26.89 DECREASED FUNCTIONAL MOBILITY: ICD-10-CM

## 2022-05-05 DIAGNOSIS — I15.2 HYPERTENSION ASSOCIATED WITH DIABETES: ICD-10-CM

## 2022-05-05 LAB
ANION GAP SERPL CALCULATED.3IONS-SCNC: 13 MMOL/L (ref 5–15)
BUN SERPL-MCNC: 23 MG/DL (ref 6–20)
BUN/CREAT SERPL: 18.5 (ref 7–25)
CALCIUM SPEC-SCNC: 9.7 MG/DL (ref 8.6–10.5)
CHLORIDE SERPL-SCNC: 103 MMOL/L (ref 98–107)
CO2 SERPL-SCNC: 23 MMOL/L (ref 22–29)
CREAT SERPL-MCNC: 1.24 MG/DL (ref 0.57–1)
EGFRCR SERPLBLD CKD-EPI 2021: 52.1 ML/MIN/1.73
FLUAV SUBTYP SPEC NAA+PROBE: NOT DETECTED
FLUBV RNA ISLT QL NAA+PROBE: NOT DETECTED
GLUCOSE BLDC GLUCOMTR-MCNC: 136 MG/DL (ref 70–130)
GLUCOSE BLDC GLUCOMTR-MCNC: 75 MG/DL (ref 70–130)
GLUCOSE SERPL-MCNC: 67 MG/DL (ref 65–99)
POTASSIUM SERPL-SCNC: 3.6 MMOL/L (ref 3.5–5.2)
SARS-COV-2 RNA PNL SPEC NAA+PROBE: NOT DETECTED
SODIUM SERPL-SCNC: 139 MMOL/L (ref 136–145)
WHOLE BLOOD HOLD SPECIMEN: NORMAL
WHOLE BLOOD HOLD SPECIMEN: NORMAL

## 2022-05-05 PROCEDURE — 36415 COLL VENOUS BLD VENIPUNCTURE: CPT | Performed by: EMERGENCY MEDICINE

## 2022-05-05 PROCEDURE — 80048 BASIC METABOLIC PNL TOTAL CA: CPT | Performed by: EMERGENCY MEDICINE

## 2022-05-05 PROCEDURE — 25010000002 ENOXAPARIN PER 10 MG

## 2022-05-05 PROCEDURE — 73610 X-RAY EXAM OF ANKLE: CPT

## 2022-05-05 PROCEDURE — 87636 SARSCOV2 & INF A&B AMP PRB: CPT | Performed by: INTERNAL MEDICINE

## 2022-05-05 PROCEDURE — 25010000002 MORPHINE PER 10 MG: Performed by: EMERGENCY MEDICINE

## 2022-05-05 PROCEDURE — 96361 HYDRATE IV INFUSION ADD-ON: CPT

## 2022-05-05 PROCEDURE — 96372 THER/PROPH/DIAG INJ SC/IM: CPT

## 2022-05-05 PROCEDURE — G0378 HOSPITAL OBSERVATION PER HR: HCPCS

## 2022-05-05 PROCEDURE — 82962 GLUCOSE BLOOD TEST: CPT

## 2022-05-05 PROCEDURE — C9803 HOPD COVID-19 SPEC COLLECT: HCPCS

## 2022-05-05 PROCEDURE — 25010000002 FENTANYL CITRATE (PF) 50 MCG/ML SOLUTION: Performed by: EMERGENCY MEDICINE

## 2022-05-05 PROCEDURE — 25010000002 ONDANSETRON PER 1 MG

## 2022-05-05 PROCEDURE — 96374 THER/PROPH/DIAG INJ IV PUSH: CPT

## 2022-05-05 PROCEDURE — 25010000002 HYDROMORPHONE 1 MG/ML SOLUTION

## 2022-05-05 PROCEDURE — 73590 X-RAY EXAM OF LOWER LEG: CPT

## 2022-05-05 PROCEDURE — 36415 COLL VENOUS BLD VENIPUNCTURE: CPT

## 2022-05-05 PROCEDURE — 96375 TX/PRO/DX INJ NEW DRUG ADDON: CPT

## 2022-05-05 PROCEDURE — 99284 EMERGENCY DEPT VISIT MOD MDM: CPT

## 2022-05-05 RX ORDER — TIZANIDINE 4 MG/1
4 TABLET ORAL NIGHTLY PRN
Status: DISCONTINUED | OUTPATIENT
Start: 2022-05-05 | End: 2022-05-07 | Stop reason: HOSPADM

## 2022-05-05 RX ORDER — GABAPENTIN 100 MG/1
200 CAPSULE ORAL EVERY 8 HOURS SCHEDULED
Status: DISCONTINUED | OUTPATIENT
Start: 2022-05-05 | End: 2022-05-07 | Stop reason: HOSPADM

## 2022-05-05 RX ORDER — INSULIN ASPART 100 [IU]/ML
0-14 INJECTION, SOLUTION INTRAVENOUS; SUBCUTANEOUS
Status: DISCONTINUED | OUTPATIENT
Start: 2022-05-06 | End: 2022-05-06

## 2022-05-05 RX ORDER — DEXTROSE MONOHYDRATE 25 G/50ML
25 INJECTION, SOLUTION INTRAVENOUS
Status: DISCONTINUED | OUTPATIENT
Start: 2022-05-05 | End: 2022-05-07 | Stop reason: HOSPADM

## 2022-05-05 RX ORDER — ONDANSETRON 2 MG/ML
4 INJECTION INTRAMUSCULAR; INTRAVENOUS EVERY 6 HOURS PRN
Status: DISCONTINUED | OUTPATIENT
Start: 2022-05-05 | End: 2022-05-07 | Stop reason: HOSPADM

## 2022-05-05 RX ORDER — FAMOTIDINE 40 MG/1
40 TABLET, FILM COATED ORAL NIGHTLY
COMMUNITY

## 2022-05-05 RX ORDER — LISINOPRIL 10 MG/1
10 TABLET ORAL DAILY
Status: DISCONTINUED | OUTPATIENT
Start: 2022-05-06 | End: 2022-05-07 | Stop reason: HOSPADM

## 2022-05-05 RX ORDER — HYDROCODONE BITARTRATE AND ACETAMINOPHEN 5; 325 MG/1; MG/1
1 TABLET ORAL ONCE
Status: COMPLETED | OUTPATIENT
Start: 2022-05-05 | End: 2022-05-05

## 2022-05-05 RX ORDER — FENTANYL CITRATE 50 UG/ML
1 INJECTION, SOLUTION INTRAMUSCULAR; INTRAVENOUS ONCE
Status: COMPLETED | OUTPATIENT
Start: 2022-05-05 | End: 2022-05-05

## 2022-05-05 RX ORDER — PANTOPRAZOLE SODIUM 40 MG/10ML
40 INJECTION, POWDER, LYOPHILIZED, FOR SOLUTION INTRAVENOUS
Status: DISCONTINUED | OUTPATIENT
Start: 2022-05-06 | End: 2022-05-06

## 2022-05-05 RX ORDER — NICOTINE POLACRILEX 4 MG
15 LOZENGE BUCCAL
Status: DISCONTINUED | OUTPATIENT
Start: 2022-05-05 | End: 2022-05-07 | Stop reason: HOSPADM

## 2022-05-05 RX ORDER — SODIUM CHLORIDE 0.9 % (FLUSH) 0.9 %
10 SYRINGE (ML) INJECTION AS NEEDED
Status: DISCONTINUED | OUTPATIENT
Start: 2022-05-05 | End: 2022-05-07 | Stop reason: HOSPADM

## 2022-05-05 RX ORDER — SODIUM CHLORIDE 9 MG/ML
125 INJECTION, SOLUTION INTRAVENOUS CONTINUOUS
Status: DISCONTINUED | OUTPATIENT
Start: 2022-05-05 | End: 2022-05-06

## 2022-05-05 RX ORDER — BUPROPION HYDROCHLORIDE 100 MG/1
100 TABLET ORAL EVERY 12 HOURS SCHEDULED
Status: DISCONTINUED | OUTPATIENT
Start: 2022-05-05 | End: 2022-05-07 | Stop reason: HOSPADM

## 2022-05-05 RX ORDER — ENOXAPARIN SODIUM 100 MG/ML
40 INJECTION SUBCUTANEOUS EVERY 24 HOURS
Status: DISCONTINUED | OUTPATIENT
Start: 2022-05-05 | End: 2022-05-07 | Stop reason: HOSPADM

## 2022-05-05 RX ORDER — FUROSEMIDE 20 MG/1
20 TABLET ORAL DAILY
Status: DISCONTINUED | OUTPATIENT
Start: 2022-05-06 | End: 2022-05-07 | Stop reason: HOSPADM

## 2022-05-05 RX ORDER — ACETAMINOPHEN 325 MG/1
650 TABLET ORAL EVERY 6 HOURS PRN
Status: DISCONTINUED | OUTPATIENT
Start: 2022-05-05 | End: 2022-05-07 | Stop reason: HOSPADM

## 2022-05-05 RX ORDER — TRAMADOL HYDROCHLORIDE 50 MG/1
50 TABLET ORAL EVERY 6 HOURS PRN
Status: DISCONTINUED | OUTPATIENT
Start: 2022-05-05 | End: 2022-05-07 | Stop reason: HOSPADM

## 2022-05-05 RX ORDER — FENTANYL CITRATE 50 UG/ML
1 INJECTION, SOLUTION INTRAMUSCULAR; INTRAVENOUS ONCE
Status: DISCONTINUED | OUTPATIENT
Start: 2022-05-05 | End: 2022-05-05

## 2022-05-05 RX ADMIN — Medication 10 ML: at 23:07

## 2022-05-05 RX ADMIN — TRAMADOL HYDROCHLORIDE 50 MG: 50 TABLET, COATED ORAL at 23:41

## 2022-05-05 RX ADMIN — SERTRALINE 100 MG: 50 TABLET, FILM COATED ORAL at 23:07

## 2022-05-05 RX ADMIN — BUPROPION HYDROCHLORIDE 100 MG: 100 TABLET, FILM COATED ORAL at 23:07

## 2022-05-05 RX ADMIN — TIZANIDINE 4 MG: 4 TABLET ORAL at 23:12

## 2022-05-05 RX ADMIN — GABAPENTIN 200 MG: 100 CAPSULE ORAL at 23:07

## 2022-05-05 RX ADMIN — SODIUM CHLORIDE 125 ML/HR: 9 INJECTION, SOLUTION INTRAVENOUS at 19:08

## 2022-05-05 RX ADMIN — ONDANSETRON 4 MG: 2 INJECTION INTRAMUSCULAR; INTRAVENOUS at 23:41

## 2022-05-05 RX ADMIN — HYDROMORPHONE HYDROCHLORIDE 0.5 MG: 1 INJECTION, SOLUTION INTRAMUSCULAR; INTRAVENOUS; SUBCUTANEOUS at 23:07

## 2022-05-05 RX ADMIN — ENOXAPARIN SODIUM 40 MG: 40 INJECTION SUBCUTANEOUS at 23:13

## 2022-05-05 RX ADMIN — HYDROCODONE BITARTRATE AND ACETAMINOPHEN 1 TABLET: 5; 325 TABLET ORAL at 16:43

## 2022-05-05 RX ADMIN — FENTANYL CITRATE 79.5 MCG: 50 INJECTION INTRAMUSCULAR; INTRAVENOUS at 19:07

## 2022-05-05 RX ADMIN — MORPHINE SULFATE 4 MG: 4 INJECTION INTRAVENOUS at 20:21

## 2022-05-06 ENCOUNTER — APPOINTMENT (OUTPATIENT)
Dept: CARDIOLOGY | Facility: HOSPITAL | Age: 52
End: 2022-05-06

## 2022-05-06 ENCOUNTER — APPOINTMENT (OUTPATIENT)
Dept: GENERAL RADIOLOGY | Facility: HOSPITAL | Age: 52
End: 2022-05-06

## 2022-05-06 ENCOUNTER — APPOINTMENT (OUTPATIENT)
Dept: CT IMAGING | Facility: HOSPITAL | Age: 52
End: 2022-05-06

## 2022-05-06 PROBLEM — S82.862A: Status: ACTIVE | Noted: 2022-05-06

## 2022-05-06 LAB
BH CV ECHO MEAS - ACS: 1.65 CM
BH CV ECHO MEAS - AO MAX PG: 16.4 MMHG
BH CV ECHO MEAS - AO MEAN PG: 7.1 MMHG
BH CV ECHO MEAS - AO ROOT DIAM: 2.7 CM
BH CV ECHO MEAS - AO V2 MAX: 202.2 CM/SEC
BH CV ECHO MEAS - AO V2 VTI: 33.8 CM
BH CV ECHO MEAS - AVA(I,D): 1.97 CM2
BH CV ECHO MEAS - EDV(CUBED): 48.3 ML
BH CV ECHO MEAS - EDV(MOD-SP2): 74.8 ML
BH CV ECHO MEAS - EDV(MOD-SP4): 80.9 ML
BH CV ECHO MEAS - EF(MOD-SP2): 62.8 %
BH CV ECHO MEAS - EF(MOD-SP4): 61.7 %
BH CV ECHO MEAS - ESV(CUBED): 13.6 ML
BH CV ECHO MEAS - ESV(MOD-SP2): 27.8 ML
BH CV ECHO MEAS - ESV(MOD-SP4): 31 ML
BH CV ECHO MEAS - FS: 34.4 %
BH CV ECHO MEAS - IVS/LVPW: 1.03 CM
BH CV ECHO MEAS - IVSD: 1.13 CM
BH CV ECHO MEAS - LA DIMENSION: 3.6 CM
BH CV ECHO MEAS - LAT PEAK E' VEL: 8.3 CM/SEC
BH CV ECHO MEAS - LV DIASTOLIC VOL/BSA (35-75): 43.1 CM2
BH CV ECHO MEAS - LV MASS(C)D: 128.6 GRAMS
BH CV ECHO MEAS - LV MAX PG: 7.9 MMHG
BH CV ECHO MEAS - LV MEAN PG: 4.6 MMHG
BH CV ECHO MEAS - LV SYSTOLIC VOL/BSA (12-30): 16.5 CM2
BH CV ECHO MEAS - LV V1 MAX: 140.7 CM/SEC
BH CV ECHO MEAS - LV V1 VTI: 22.4 CM
BH CV ECHO MEAS - LVIDD: 3.6 CM
BH CV ECHO MEAS - LVIDS: 2.39 CM
BH CV ECHO MEAS - LVOT AREA: 3 CM2
BH CV ECHO MEAS - LVOT DIAM: 1.94 CM
BH CV ECHO MEAS - LVPWD: 1.1 CM
BH CV ECHO MEAS - MED PEAK E' VEL: 9.1 CM/SEC
BH CV ECHO MEAS - MV A MAX VEL: 97.7 CM/SEC
BH CV ECHO MEAS - MV DEC SLOPE: 676.4 CM/SEC2
BH CV ECHO MEAS - MV E MAX VEL: 96.4 CM/SEC
BH CV ECHO MEAS - MV E/A: 0.99
BH CV ECHO MEAS - MV MAX PG: 6.9 MMHG
BH CV ECHO MEAS - MV MEAN PG: 3 MMHG
BH CV ECHO MEAS - MV P1/2T: 57 MSEC
BH CV ECHO MEAS - MV V2 VTI: 25.5 CM
BH CV ECHO MEAS - MVA(P1/2T): 3.9 CM2
BH CV ECHO MEAS - MVA(VTI): 2.6 CM2
BH CV ECHO MEAS - PA V2 MAX: 114 CM/SEC
BH CV ECHO MEAS - RAP SYSTOLE: 10 MMHG
BH CV ECHO MEAS - RVDD: 1.88 CM
BH CV ECHO MEAS - RVSP: 50.4 MMHG
BH CV ECHO MEAS - SI(MOD-SP2): 25.1 ML/M2
BH CV ECHO MEAS - SI(MOD-SP4): 26.6 ML/M2
BH CV ECHO MEAS - SV(LVOT): 66.4 ML
BH CV ECHO MEAS - SV(MOD-SP2): 47 ML
BH CV ECHO MEAS - SV(MOD-SP4): 49.9 ML
BH CV ECHO MEAS - TR MAX PG: 40.4 MMHG
BH CV ECHO MEAS - TR MAX VEL: 317.6 CM/SEC
BH CV ECHO MEASUREMENTS AVERAGE E/E' RATIO: 11.08
GLUCOSE BLDC GLUCOMTR-MCNC: 364 MG/DL (ref 70–130)
LEFT ATRIUM VOLUME INDEX: 26.1 ML/M2
MAXIMAL PREDICTED HEART RATE: 167 BPM
STRESS TARGET HR: 142 BPM

## 2022-05-06 PROCEDURE — 25010000002 ENOXAPARIN PER 10 MG

## 2022-05-06 PROCEDURE — 25010000002 HYDROMORPHONE 1 MG/ML SOLUTION

## 2022-05-06 PROCEDURE — 96376 TX/PRO/DX INJ SAME DRUG ADON: CPT

## 2022-05-06 PROCEDURE — 73700 CT LOWER EXTREMITY W/O DYE: CPT

## 2022-05-06 PROCEDURE — 96361 HYDRATE IV INFUSION ADD-ON: CPT

## 2022-05-06 PROCEDURE — G0378 HOSPITAL OBSERVATION PER HR: HCPCS

## 2022-05-06 PROCEDURE — 73610 X-RAY EXAM OF ANKLE: CPT

## 2022-05-06 PROCEDURE — 96372 THER/PROPH/DIAG INJ SC/IM: CPT

## 2022-05-06 PROCEDURE — 93306 TTE W/DOPPLER COMPLETE: CPT

## 2022-05-06 PROCEDURE — 93306 TTE W/DOPPLER COMPLETE: CPT | Performed by: INTERNAL MEDICINE

## 2022-05-06 PROCEDURE — 76000 FLUOROSCOPY <1 HR PHYS/QHP: CPT

## 2022-05-06 PROCEDURE — 25010000002 ONDANSETRON PER 1 MG

## 2022-05-06 PROCEDURE — 96375 TX/PRO/DX INJ NEW DRUG ADDON: CPT

## 2022-05-06 PROCEDURE — 99204 OFFICE O/P NEW MOD 45 MIN: CPT | Performed by: ORTHOPAEDIC SURGERY

## 2022-05-06 PROCEDURE — 25010000002 PERFLUTREN (DEFINITY) 8.476 MG IN SODIUM CHLORIDE (PF) 0.9 % 10 ML INJECTION

## 2022-05-06 PROCEDURE — 97166 OT EVAL MOD COMPLEX 45 MIN: CPT

## 2022-05-06 PROCEDURE — 97162 PT EVAL MOD COMPLEX 30 MIN: CPT

## 2022-05-06 PROCEDURE — 82962 GLUCOSE BLOOD TEST: CPT

## 2022-05-06 RX ORDER — INSULIN LISPRO 100 [IU]/ML
3-5 INJECTION, SOLUTION INTRAVENOUS; SUBCUTANEOUS
Status: DISCONTINUED | OUTPATIENT
Start: 2022-05-07 | End: 2022-05-07 | Stop reason: HOSPADM

## 2022-05-06 RX ORDER — PANTOPRAZOLE SODIUM 40 MG/1
40 TABLET, DELAYED RELEASE ORAL
Status: DISCONTINUED | OUTPATIENT
Start: 2022-05-07 | End: 2022-05-07 | Stop reason: HOSPADM

## 2022-05-06 RX ADMIN — TRAMADOL HYDROCHLORIDE 50 MG: 50 TABLET, COATED ORAL at 18:00

## 2022-05-06 RX ADMIN — HYDROMORPHONE HYDROCHLORIDE 0.5 MG: 1 INJECTION, SOLUTION INTRAMUSCULAR; INTRAVENOUS; SUBCUTANEOUS at 10:19

## 2022-05-06 RX ADMIN — GABAPENTIN 200 MG: 100 CAPSULE ORAL at 14:10

## 2022-05-06 RX ADMIN — ENOXAPARIN SODIUM 40 MG: 40 INJECTION SUBCUTANEOUS at 22:08

## 2022-05-06 RX ADMIN — SERTRALINE 100 MG: 50 TABLET, FILM COATED ORAL at 07:49

## 2022-05-06 RX ADMIN — PANTOPRAZOLE SODIUM 40 MG: 40 INJECTION, POWDER, FOR SOLUTION INTRAVENOUS at 05:04

## 2022-05-06 RX ADMIN — TRAMADOL HYDROCHLORIDE 50 MG: 50 TABLET, COATED ORAL at 10:19

## 2022-05-06 RX ADMIN — FUROSEMIDE 20 MG: 20 TABLET ORAL at 07:49

## 2022-05-06 RX ADMIN — SERTRALINE 100 MG: 50 TABLET, FILM COATED ORAL at 20:46

## 2022-05-06 RX ADMIN — PERFLUTREN 1.5 ML: 6.52 INJECTION, SUSPENSION INTRAVENOUS at 12:14

## 2022-05-06 RX ADMIN — ATORVASTATIN CALCIUM 60 MG: 40 TABLET, FILM COATED ORAL at 07:48

## 2022-05-06 RX ADMIN — HYDROMORPHONE HYDROCHLORIDE 0.5 MG: 1 INJECTION, SOLUTION INTRAMUSCULAR; INTRAVENOUS; SUBCUTANEOUS at 05:06

## 2022-05-06 RX ADMIN — ACETAMINOPHEN 650 MG: 325 TABLET ORAL at 20:46

## 2022-05-06 RX ADMIN — SODIUM CHLORIDE 125 ML/HR: 9 INJECTION, SOLUTION INTRAVENOUS at 05:04

## 2022-05-06 RX ADMIN — HYDROMORPHONE HYDROCHLORIDE 0.5 MG: 1 INJECTION, SOLUTION INTRAMUSCULAR; INTRAVENOUS; SUBCUTANEOUS at 01:35

## 2022-05-06 RX ADMIN — INSULIN GLARGINE 30 UNITS: 300 INJECTION, SOLUTION SUBCUTANEOUS at 23:30

## 2022-05-06 RX ADMIN — ONDANSETRON 4 MG: 2 INJECTION INTRAMUSCULAR; INTRAVENOUS at 16:05

## 2022-05-06 RX ADMIN — GABAPENTIN 200 MG: 100 CAPSULE ORAL at 22:08

## 2022-05-06 RX ADMIN — LISINOPRIL 10 MG: 10 TABLET ORAL at 07:49

## 2022-05-06 RX ADMIN — BUPROPION HYDROCHLORIDE 100 MG: 100 TABLET, FILM COATED ORAL at 07:49

## 2022-05-06 RX ADMIN — BUPROPION HYDROCHLORIDE 100 MG: 100 TABLET, FILM COATED ORAL at 20:46

## 2022-05-06 RX ADMIN — GABAPENTIN 200 MG: 100 CAPSULE ORAL at 05:04

## 2022-05-07 VITALS
OXYGEN SATURATION: 95 % | WEIGHT: 188.4 LBS | RESPIRATION RATE: 18 BRPM | DIASTOLIC BLOOD PRESSURE: 63 MMHG | TEMPERATURE: 99.2 F | SYSTOLIC BLOOD PRESSURE: 141 MMHG | HEIGHT: 63 IN | BODY MASS INDEX: 33.38 KG/M2 | HEART RATE: 86 BPM

## 2022-05-07 LAB
ALBUMIN SERPL-MCNC: 3.8 G/DL (ref 3.5–5.2)
ALBUMIN/GLOB SERPL: 1.3 G/DL
ALP SERPL-CCNC: 84 U/L (ref 39–117)
ALT SERPL W P-5'-P-CCNC: 13 U/L (ref 1–33)
ANION GAP SERPL CALCULATED.3IONS-SCNC: 10 MMOL/L (ref 5–15)
AST SERPL-CCNC: 18 U/L (ref 1–32)
BASOPHILS # BLD AUTO: 0.02 10*3/MM3 (ref 0–0.2)
BASOPHILS NFR BLD AUTO: 0.3 % (ref 0–1.5)
BILIRUB SERPL-MCNC: 0.5 MG/DL (ref 0–1.2)
BUN SERPL-MCNC: 24 MG/DL (ref 6–20)
BUN/CREAT SERPL: 17.1 (ref 7–25)
CALCIUM SPEC-SCNC: 9.2 MG/DL (ref 8.6–10.5)
CHLORIDE SERPL-SCNC: 100 MMOL/L (ref 98–107)
CO2 SERPL-SCNC: 23 MMOL/L (ref 22–29)
CREAT SERPL-MCNC: 1.4 MG/DL (ref 0.57–1)
DEPRECATED RDW RBC AUTO: 42.2 FL (ref 37–54)
EGFRCR SERPLBLD CKD-EPI 2021: 45.1 ML/MIN/1.73
EOSINOPHIL # BLD AUTO: 0.17 10*3/MM3 (ref 0–0.4)
EOSINOPHIL NFR BLD AUTO: 2.6 % (ref 0.3–6.2)
ERYTHROCYTE [DISTWIDTH] IN BLOOD BY AUTOMATED COUNT: 13.4 % (ref 12.3–15.4)
GLOBULIN UR ELPH-MCNC: 2.9 GM/DL
GLUCOSE SERPL-MCNC: 292 MG/DL (ref 65–99)
HCT VFR BLD AUTO: 29.6 % (ref 34–46.6)
HGB BLD-MCNC: 9.9 G/DL (ref 12–15.9)
IMM GRANULOCYTES # BLD AUTO: 0.01 10*3/MM3 (ref 0–0.05)
IMM GRANULOCYTES NFR BLD AUTO: 0.2 % (ref 0–0.5)
LYMPHOCYTES # BLD AUTO: 1.88 10*3/MM3 (ref 0.7–3.1)
LYMPHOCYTES NFR BLD AUTO: 28.9 % (ref 19.6–45.3)
MCH RBC QN AUTO: 29 PG (ref 26.6–33)
MCHC RBC AUTO-ENTMCNC: 33.4 G/DL (ref 31.5–35.7)
MCV RBC AUTO: 86.8 FL (ref 79–97)
MONOCYTES # BLD AUTO: 0.67 10*3/MM3 (ref 0.1–0.9)
MONOCYTES NFR BLD AUTO: 10.3 % (ref 5–12)
NEUTROPHILS NFR BLD AUTO: 3.75 10*3/MM3 (ref 1.7–7)
NEUTROPHILS NFR BLD AUTO: 57.7 % (ref 42.7–76)
NRBC BLD AUTO-RTO: 0 /100 WBC (ref 0–0.2)
PLATELET # BLD AUTO: 164 10*3/MM3 (ref 140–450)
PMV BLD AUTO: 12.4 FL (ref 6–12)
POTASSIUM SERPL-SCNC: 4.3 MMOL/L (ref 3.5–5.2)
PROT SERPL-MCNC: 6.7 G/DL (ref 6–8.5)
RBC # BLD AUTO: 3.41 10*6/MM3 (ref 3.77–5.28)
SODIUM SERPL-SCNC: 133 MMOL/L (ref 136–145)
WBC NRBC COR # BLD: 6.5 10*3/MM3 (ref 3.4–10.8)

## 2022-05-07 PROCEDURE — 97110 THERAPEUTIC EXERCISES: CPT

## 2022-05-07 PROCEDURE — G0378 HOSPITAL OBSERVATION PER HR: HCPCS

## 2022-05-07 PROCEDURE — 97530 THERAPEUTIC ACTIVITIES: CPT

## 2022-05-07 PROCEDURE — 97535 SELF CARE MNGMENT TRAINING: CPT

## 2022-05-07 PROCEDURE — 85025 COMPLETE CBC W/AUTO DIFF WBC: CPT | Performed by: NURSE PRACTITIONER

## 2022-05-07 PROCEDURE — 80053 COMPREHEN METABOLIC PANEL: CPT | Performed by: NURSE PRACTITIONER

## 2022-05-07 RX ORDER — TRAMADOL HYDROCHLORIDE 50 MG/1
50 TABLET ORAL EVERY 6 HOURS PRN
Qty: 12 TABLET | Refills: 0 | Status: SHIPPED | OUTPATIENT
Start: 2022-05-07 | End: 2022-05-10

## 2022-05-07 RX ADMIN — TRAMADOL HYDROCHLORIDE 50 MG: 50 TABLET, COATED ORAL at 06:08

## 2022-05-07 RX ADMIN — ATORVASTATIN CALCIUM 60 MG: 40 TABLET, FILM COATED ORAL at 07:35

## 2022-05-07 RX ADMIN — PANTOPRAZOLE SODIUM 40 MG: 40 TABLET, DELAYED RELEASE ORAL at 05:59

## 2022-05-07 RX ADMIN — LISINOPRIL 10 MG: 10 TABLET ORAL at 07:36

## 2022-05-07 RX ADMIN — FUROSEMIDE 20 MG: 20 TABLET ORAL at 07:36

## 2022-05-07 RX ADMIN — GABAPENTIN 200 MG: 100 CAPSULE ORAL at 05:59

## 2022-05-07 RX ADMIN — INSULIN LISPRO 5 UNITS: 100 INJECTION, SOLUTION INTRAVENOUS; SUBCUTANEOUS at 07:34

## 2022-05-07 RX ADMIN — BUPROPION HYDROCHLORIDE 100 MG: 100 TABLET, FILM COATED ORAL at 07:36

## 2022-05-07 RX ADMIN — SERTRALINE 100 MG: 50 TABLET, FILM COATED ORAL at 07:36

## 2022-05-08 ENCOUNTER — READMISSION MANAGEMENT (OUTPATIENT)
Dept: CALL CENTER | Facility: HOSPITAL | Age: 52
End: 2022-05-08

## 2022-05-08 NOTE — OUTREACH NOTE
Prep Survey    Flowsheet Row Responses   [facility] facility patient discharged from? [facility]   Is LACE score < 7 ? No   Emergency Room discharge w/ pulse ox? No   Eligibility Readm Mgmt   Discharge diagnosis Displaced Maisonneuve fracture of left lower extremity, Closed trimalleolar fracture of right ankle, S/P Closed reduction of the right ankle   Does the patient have one of the following disease processes/diagnoses(primary or secondary)? Other   Does the patient have Home health ordered? No   Is there a DME ordered? Yes   What DME was ordered? [facility]   Prep survey completed? Yes          АЛЕКСАНДР GILLIAM - Registered Nurse

## 2022-05-11 ENCOUNTER — READMISSION MANAGEMENT (OUTPATIENT)
Dept: CALL CENTER | Facility: HOSPITAL | Age: 52
End: 2022-05-11

## 2022-05-11 ENCOUNTER — OFFICE VISIT (OUTPATIENT)
Dept: PODIATRY | Facility: CLINIC | Age: 52
End: 2022-05-11

## 2022-05-11 VITALS — BODY MASS INDEX: 33.31 KG/M2 | HEIGHT: 63 IN | HEART RATE: 80 BPM | OXYGEN SATURATION: 98 % | WEIGHT: 188 LBS

## 2022-05-11 DIAGNOSIS — S93.432A SYNDESMOTIC DISRUPTION OF LEFT ANKLE, INITIAL ENCOUNTER: ICD-10-CM

## 2022-05-11 DIAGNOSIS — S82.851A CLOSED TRIMALLEOLAR FRACTURE OF RIGHT ANKLE, INITIAL ENCOUNTER: Primary | ICD-10-CM

## 2022-05-11 PROCEDURE — 99204 OFFICE O/P NEW MOD 45 MIN: CPT | Performed by: PODIATRIST

## 2022-05-11 RX ORDER — BUPIVACAINE HCL/0.9 % NACL/PF 0.1 %
2 PLASTIC BAG, INJECTION (ML) EPIDURAL ONCE
Status: CANCELLED | OUTPATIENT
Start: 2022-05-12 | End: 2022-05-11

## 2022-05-11 RX ORDER — FUROSEMIDE 20 MG/1
10 TABLET ORAL EVERY OTHER DAY
COMMUNITY
End: 2022-10-17

## 2022-05-11 RX ORDER — INSULIN GLARGINE 300 U/ML
24-30 INJECTION, SOLUTION SUBCUTANEOUS NIGHTLY
COMMUNITY
End: 2022-05-17

## 2022-05-11 RX ORDER — INSULIN LISPRO 100 [IU]/ML
0-20 INJECTION, SOLUTION INTRAVENOUS; SUBCUTANEOUS 3 TIMES DAILY
COMMUNITY
End: 2022-09-08

## 2022-05-11 NOTE — OUTREACH NOTE
Medical Week 1 Survey    Flowsheet Row Responses   [facility] [place] patient discharged from? [place]   Does the patient have one of the following disease processes/diagnoses(primary or secondary)? Other   Week 1 attempt successful? No   Unsuccessful attempts Attempt 1          MAUREEN KNIGHT - Registered Nurse

## 2022-05-11 NOTE — H&P (VIEW-ONLY)
Amanda Padron  1970  51 y.o. female   PCP: 02/08/2022  BC:150 per pt      05/11/2022    Chief Complaint   Patient presents with   • Right Ankle - Follow-up, Pain   • Left Ankle - Follow-up, Pain       History of Present Illness    Amanda Padron is a 51 y.o.female presents to clinic today for bilateral fx hospital follow up. Patient states her blood sugar dropped and she fell causing the injury. Patient states pain is a 7. Xrays obtained 05/06/2022.       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 No pertinent surgical history.      Family History   Problem Relation Age of Onset   • Diabetes Sister    • Coronary artery disease Other    • Diabetes Other    • Hypertension Other        Allergies   Allergen Reactions   • Altace [Ramipril]        Social History     Socioeconomic History   • Marital status:    Tobacco Use   • Smoking status: Never Smoker   • Smokeless tobacco: Never Used         Current Outpatient Medications   Medication Sig Dispense Refill   • acetaminophen (TYLENOL) 650 MG 8 hr tablet Take 650 mg by mouth Every 8 (Eight) Hours As Needed for Mild Pain .     • atorvastatin (LIPITOR) 40 MG tablet Take 60 mg by mouth daily.     • buPROPion (WELLBUTRIN) 100 MG tablet Take 100 mg by mouth 2 (Two) Times a Day.     • busPIRone (BUSPAR) 5 MG tablet Take 5 mg by mouth 2 (Two) Times a Day.     • cetirizine (zyrTEC) 10 MG tablet Take 10 mg by mouth Daily.     • Continuous Blood Gluc Sensor (Dexcom G6 Sensor) As Needed (glucose control). Every 10 daysDexcom G6 Sensor Kit [address]-90 Use as directed for continuous glucose monitoring 9 each 3   • Continuous Blood Gluc Sensor (FreeStyle Marquis 2 Sensor) misc 1 each Every 14 (Fourteen) Days. 2 each 4   • famotidine (PEPCID) 40 MG  "tablet Take 40 mg by mouth Daily.     • furosemide (Lasix) 20 MG tablet Take 1 tablet by mouth Daily. (Patient taking differently: Take 20 mg by mouth Daily. Takes 1/2 every other day) 30 tablet 11   • gabapentin (NEURONTIN) 100 MG capsule Take 100 mg by mouth Daily.     • Glucagon (Gvoke HypoPen 2-Pack) 1 MG/0.2ML solution auto-injector Inject 1 mg under the skin into the appropriate area as directed As Needed (for hypoglycemia). 0.4 mL 1   • Glucose Blood (Blood Glucose Test) strip Use 4 x daily use any brand covered by insurance or same brand as before ICD10 code is E11.9 120 each 11   • hydrALAZINE (APRESOLINE) 25 MG tablet Take 50 mg by mouth 3 (Three) Times a Day.     • insulin degludec (TRESIBA FLEXTOUCH) 100 UNIT/ML solution pen-injector injection Inject 30 Units under the skin into the appropriate area as directed Daily. 2 pen 11   • Insulin Glargine, 2 Unit Dial, (Toujeo Max SoloStar) 300 UNIT/ML solution pen-injector injection 30 units daily 3 pen 3   • Insulin Lispro, 1 Unit Dial, (HumaLOG KwikPen) 100 UNIT/ML solution pen-injector Up to 20 units with meals, E11.65 6 pen 11   • Insulin Pen Needle (Advocate Insulin Pen Needles) 31G X 5 MM misc Use to inject insulin 4 times per day 200 each 11   • Insulin Pen Needle (B-D ULTRAFINE III SHORT PEN) 31G X 8 MM misc Use to inject insulin 4 times per day 120 each 11   • Lancets Misc. (Accu-Chek Multiclix Lancet Dev) kit Provide lancing device and 5 lancets per day use as needed 150 each 11   • lisinopril (PRINIVIL,ZESTRIL) 10 MG tablet Take 1 tablet by mouth Daily. 30 tablet 11   • sertraline (ZOLOFT) 100 MG tablet Take 100 mg by mouth 2 (Two) Times a Day.     • tiZANidine (ZANAFLEX) 4 MG tablet Take 4 mg by mouth At Night As Needed for Muscle Spasms.     • Insulin Syringe-Needle U-100 (B-D INS SYRINGE 0.5CC/31GX5/16) 31G X 5/16\" 0.5 ML misc 5 (five) times a day.     • Insulin Syringe-Needle U-100 (B-D INSULIN SYRINGE 1CC/27G) 27G X 1/2\" 1 ML misc 4 (four) times " "a day.     • raNITIdine (ZANTAC) 150 MG tablet Take 150 mg by mouth 2 (Two) Times a Day.       No current facility-administered medications for this visit.       Review of Systems   Constitutional: Negative.    HENT: Negative.    Eyes: Negative.    Respiratory: Negative.    Cardiovascular: Negative.    Gastrointestinal: Negative.    Endocrine: Negative.    Genitourinary: Negative.    Musculoskeletal:        Foot pain  Ankle pain   Skin: Negative.    Allergic/Immunologic: Negative.    Neurological: Negative.    Hematological: Negative.    Psychiatric/Behavioral: Negative.          OBJECTIVE    Pulse 80   Ht 160 cm (62.99\")   Wt 85.3 kg (188 lb)   SpO2 98%   BMI 33.31 kg/m²     Physical Exam  Vitals reviewed.   Constitutional:       General: She is not in acute distress.     Appearance: She is well-developed.   HENT:      Head: Normocephalic and atraumatic.      Nose: Nose normal.   Eyes:      Conjunctiva/sclera: Conjunctivae normal.      Pupils: Pupils are equal, round, and reactive to light.   Pulmonary:      Effort: Pulmonary effort is normal. No respiratory distress.      Breath sounds: No wheezing.   Musculoskeletal:         General: Swelling and tenderness present. No deformity.      Right ankle: Swelling and ecchymosis present. Tenderness present over the lateral malleolus and medial malleolus. Decreased range of motion. Normal pulse.      Right Achilles Tendon: Normal.      Left ankle: Swelling present. No ecchymosis. Tenderness present over the lateral malleolus and proximal fibula.   Skin:     General: Skin is warm and dry.      Capillary Refill: Capillary refill takes less than 2 seconds.      Findings: Bruising present.   Neurological:      Mental Status: She is alert and oriented to person, place, and time.   Psychiatric:         Behavior: Behavior normal.         Thought Content: Thought content normal.              Procedures        ASSESSMENT AND PLAN    Diagnoses and all orders for this " visit:    1. Closed trimalleolar fracture of right ankle, initial encounter (Primary)  -     Case Request; Standing  -     Case Request    2. Syndesmotic disruption of left ankle, initial encounter  -     Case Request; Standing  -     Case Request        -Patient examined and evaluated  -Records and imaging reviewed  -Discussed treatment options going forward.  Patient is on board with surgical intervention.  -Surgical plan is open reduction and internal fixation of right bimalleolar ankle fracture and possible open reduction internal fixation of left syndesmotic ankle injury and all with indicated procedures.  -Risks and benefits of the procedure including but not limited to postoperative infection, incisional dehiscence, numbness, swelling, residual pain and postoperative blood clot discussed in detail.  No guarantees were given or implied.  -Tentative date for the surgery May 12, 2022  -All questions were answered  -Recheck following surgery           This document has been electronically signed by Familia Infante DPM on May 11, 2022 12:40 CDT     5/11/2022  12:40 CDT

## 2022-05-11 NOTE — PROGRESS NOTES
Amanda Padron  1970  51 y.o. female   PCP: 02/08/2022  BC:150 per pt      05/11/2022    Chief Complaint   Patient presents with   • Right Ankle - Follow-up, Pain   • Left Ankle - Follow-up, Pain       History of Present Illness    Amanda Padron is a 51 y.o.female presents to clinic today for bilateral fx hospital follow up. Patient states her blood sugar dropped and she fell causing the injury. Patient states pain is a 7. Xrays obtained 05/06/2022.       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 No pertinent surgical history.      Family History   Problem Relation Age of Onset   • Diabetes Sister    • Coronary artery disease Other    • Diabetes Other    • Hypertension Other        Allergies   Allergen Reactions   • Altace [Ramipril]        Social History     Socioeconomic History   • Marital status:    Tobacco Use   • Smoking status: Never Smoker   • Smokeless tobacco: Never Used         Current Outpatient Medications   Medication Sig Dispense Refill   • acetaminophen (TYLENOL) 650 MG 8 hr tablet Take 650 mg by mouth Every 8 (Eight) Hours As Needed for Mild Pain .     • atorvastatin (LIPITOR) 40 MG tablet Take 60 mg by mouth daily.     • buPROPion (WELLBUTRIN) 100 MG tablet Take 100 mg by mouth 2 (Two) Times a Day.     • busPIRone (BUSPAR) 5 MG tablet Take 5 mg by mouth 2 (Two) Times a Day.     • cetirizine (zyrTEC) 10 MG tablet Take 10 mg by mouth Daily.     • Continuous Blood Gluc Sensor (Dexcom G6 Sensor) As Needed (glucose control). Every 10 daysDexcom G6 Sensor Kit [address]-09 Use as directed for continuous glucose monitoring 9 each 3   • Continuous Blood Gluc Sensor (FreeStyle Marquis 2 Sensor) misc 1 each Every 14 (Fourteen) Days. 2 each 4   • famotidine (PEPCID) 40 MG  "tablet Take 40 mg by mouth Daily.     • furosemide (Lasix) 20 MG tablet Take 1 tablet by mouth Daily. (Patient taking differently: Take 20 mg by mouth Daily. Takes 1/2 every other day) 30 tablet 11   • gabapentin (NEURONTIN) 100 MG capsule Take 100 mg by mouth Daily.     • Glucagon (Gvoke HypoPen 2-Pack) 1 MG/0.2ML solution auto-injector Inject 1 mg under the skin into the appropriate area as directed As Needed (for hypoglycemia). 0.4 mL 1   • Glucose Blood (Blood Glucose Test) strip Use 4 x daily use any brand covered by insurance or same brand as before ICD10 code is E11.9 120 each 11   • hydrALAZINE (APRESOLINE) 25 MG tablet Take 50 mg by mouth 3 (Three) Times a Day.     • insulin degludec (TRESIBA FLEXTOUCH) 100 UNIT/ML solution pen-injector injection Inject 30 Units under the skin into the appropriate area as directed Daily. 2 pen 11   • Insulin Glargine, 2 Unit Dial, (Toujeo Max SoloStar) 300 UNIT/ML solution pen-injector injection 30 units daily 3 pen 3   • Insulin Lispro, 1 Unit Dial, (HumaLOG KwikPen) 100 UNIT/ML solution pen-injector Up to 20 units with meals, E11.65 6 pen 11   • Insulin Pen Needle (Advocate Insulin Pen Needles) 31G X 5 MM misc Use to inject insulin 4 times per day 200 each 11   • Insulin Pen Needle (B-D ULTRAFINE III SHORT PEN) 31G X 8 MM misc Use to inject insulin 4 times per day 120 each 11   • Lancets Misc. (Accu-Chek Multiclix Lancet Dev) kit Provide lancing device and 5 lancets per day use as needed 150 each 11   • lisinopril (PRINIVIL,ZESTRIL) 10 MG tablet Take 1 tablet by mouth Daily. 30 tablet 11   • sertraline (ZOLOFT) 100 MG tablet Take 100 mg by mouth 2 (Two) Times a Day.     • tiZANidine (ZANAFLEX) 4 MG tablet Take 4 mg by mouth At Night As Needed for Muscle Spasms.     • Insulin Syringe-Needle U-100 (B-D INS SYRINGE 0.5CC/31GX5/16) 31G X 5/16\" 0.5 ML misc 5 (five) times a day.     • Insulin Syringe-Needle U-100 (B-D INSULIN SYRINGE 1CC/27G) 27G X 1/2\" 1 ML misc 4 (four) times " "a day.     • raNITIdine (ZANTAC) 150 MG tablet Take 150 mg by mouth 2 (Two) Times a Day.       No current facility-administered medications for this visit.       Review of Systems   Constitutional: Negative.    HENT: Negative.    Eyes: Negative.    Respiratory: Negative.    Cardiovascular: Negative.    Gastrointestinal: Negative.    Endocrine: Negative.    Genitourinary: Negative.    Musculoskeletal:        Foot pain  Ankle pain   Skin: Negative.    Allergic/Immunologic: Negative.    Neurological: Negative.    Hematological: Negative.    Psychiatric/Behavioral: Negative.          OBJECTIVE    Pulse 80   Ht 160 cm (62.99\")   Wt 85.3 kg (188 lb)   SpO2 98%   BMI 33.31 kg/m²     Physical Exam  Vitals reviewed.   Constitutional:       General: She is not in acute distress.     Appearance: She is well-developed.   HENT:      Head: Normocephalic and atraumatic.      Nose: Nose normal.   Eyes:      Conjunctiva/sclera: Conjunctivae normal.      Pupils: Pupils are equal, round, and reactive to light.   Pulmonary:      Effort: Pulmonary effort is normal. No respiratory distress.      Breath sounds: No wheezing.   Musculoskeletal:         General: Swelling and tenderness present. No deformity.      Right ankle: Swelling and ecchymosis present. Tenderness present over the lateral malleolus and medial malleolus. Decreased range of motion. Normal pulse.      Right Achilles Tendon: Normal.      Left ankle: Swelling present. No ecchymosis. Tenderness present over the lateral malleolus and proximal fibula.   Skin:     General: Skin is warm and dry.      Capillary Refill: Capillary refill takes less than 2 seconds.      Findings: Bruising present.   Neurological:      Mental Status: She is alert and oriented to person, place, and time.   Psychiatric:         Behavior: Behavior normal.         Thought Content: Thought content normal.              Procedures        ASSESSMENT AND PLAN    Diagnoses and all orders for this " visit:    1. Closed trimalleolar fracture of right ankle, initial encounter (Primary)  -     Case Request; Standing  -     Case Request    2. Syndesmotic disruption of left ankle, initial encounter  -     Case Request; Standing  -     Case Request        -Patient examined and evaluated  -Records and imaging reviewed  -Discussed treatment options going forward.  Patient is on board with surgical intervention.  -Surgical plan is open reduction and internal fixation of right bimalleolar ankle fracture and possible open reduction internal fixation of left syndesmotic ankle injury and all with indicated procedures.  -Risks and benefits of the procedure including but not limited to postoperative infection, incisional dehiscence, numbness, swelling, residual pain and postoperative blood clot discussed in detail.  No guarantees were given or implied.  -Tentative date for the surgery May 12, 2022  -All questions were answered  -Recheck following surgery           This document has been electronically signed by Familia Infante DPM on May 11, 2022 12:40 CDT     5/11/2022  12:40 CDT

## 2022-05-12 ENCOUNTER — APPOINTMENT (OUTPATIENT)
Dept: GENERAL RADIOLOGY | Facility: HOSPITAL | Age: 52
End: 2022-05-12

## 2022-05-12 ENCOUNTER — HOSPITAL ENCOUNTER (OUTPATIENT)
Facility: HOSPITAL | Age: 52
Setting detail: HOSPITAL OUTPATIENT SURGERY
Discharge: HOME | End: 2022-05-12
Attending: PODIATRIST | Admitting: PODIATRIST

## 2022-05-12 ENCOUNTER — ANESTHESIA (OUTPATIENT)
Dept: PERIOP | Facility: HOSPITAL | Age: 52
End: 2022-05-12

## 2022-05-12 ENCOUNTER — ANESTHESIA EVENT (OUTPATIENT)
Dept: PERIOP | Facility: HOSPITAL | Age: 52
End: 2022-05-12

## 2022-05-12 VITALS
RESPIRATION RATE: 18 BRPM | BODY MASS INDEX: 30.86 KG/M2 | DIASTOLIC BLOOD PRESSURE: 74 MMHG | OXYGEN SATURATION: 97 % | WEIGHT: 174.16 LBS | TEMPERATURE: 97.3 F | HEIGHT: 63 IN | SYSTOLIC BLOOD PRESSURE: 125 MMHG | HEART RATE: 75 BPM

## 2022-05-12 DIAGNOSIS — S82.851A CLOSED TRIMALLEOLAR FRACTURE OF RIGHT ANKLE, INITIAL ENCOUNTER: ICD-10-CM

## 2022-05-12 DIAGNOSIS — S93.432A SYNDESMOTIC DISRUPTION OF LEFT ANKLE, INITIAL ENCOUNTER: ICD-10-CM

## 2022-05-12 LAB
GLUCOSE BLDC GLUCOMTR-MCNC: 228 MG/DL (ref 70–130)
GLUCOSE BLDC GLUCOMTR-MCNC: 307 MG/DL (ref 70–130)
GLUCOSE BLDC GLUCOMTR-MCNC: 357 MG/DL (ref 70–130)
MRSA DNA SPEC QL NAA+PROBE: NEGATIVE

## 2022-05-12 PROCEDURE — C1713 ANCHOR/SCREW BN/BN,TIS/BN: HCPCS | Performed by: PODIATRIST

## 2022-05-12 PROCEDURE — 87641 MR-STAPH DNA AMP PROBE: CPT | Performed by: PODIATRIST

## 2022-05-12 PROCEDURE — 25010000002 HYDROMORPHONE 1 MG/ML SOLUTION: Performed by: NURSE ANESTHETIST, CERTIFIED REGISTERED

## 2022-05-12 PROCEDURE — 27823 TREATMENT OF ANKLE FRACTURE: CPT | Performed by: PODIATRIST

## 2022-05-12 PROCEDURE — 77071 MNL APPL STRS JT RADIOGRAPHY: CPT | Performed by: PODIATRIST

## 2022-05-12 PROCEDURE — 76000 FLUOROSCOPY <1 HR PHYS/QHP: CPT

## 2022-05-12 PROCEDURE — 93010 ELECTROCARDIOGRAM REPORT: CPT | Performed by: INTERNAL MEDICINE

## 2022-05-12 PROCEDURE — 25010000002 SUCCINYLCHOLINE PER 20 MG: Performed by: NURSE ANESTHETIST, CERTIFIED REGISTERED

## 2022-05-12 PROCEDURE — 25010000002 FENTANYL CITRATE (PF) 50 MCG/ML SOLUTION: Performed by: NURSE ANESTHETIST, CERTIFIED REGISTERED

## 2022-05-12 PROCEDURE — 25010000002 ONDANSETRON PER 1 MG: Performed by: NURSE ANESTHETIST, CERTIFIED REGISTERED

## 2022-05-12 PROCEDURE — 25010000002 MIDAZOLAM PER 1 MG: Performed by: NURSE ANESTHETIST, CERTIFIED REGISTERED

## 2022-05-12 PROCEDURE — 93005 ELECTROCARDIOGRAM TRACING: CPT | Performed by: ANESTHESIOLOGY

## 2022-05-12 PROCEDURE — 25010000002 CEFAZOLIN PER 500 MG: Performed by: PODIATRIST

## 2022-05-12 PROCEDURE — 25010000002 HYDRALAZINE PER 20 MG: Performed by: NURSE ANESTHETIST, CERTIFIED REGISTERED

## 2022-05-12 PROCEDURE — C1769 GUIDE WIRE: HCPCS | Performed by: PODIATRIST

## 2022-05-12 PROCEDURE — 82962 GLUCOSE BLOOD TEST: CPT

## 2022-05-12 PROCEDURE — 25010000002 PROPOFOL 10 MG/ML EMULSION: Performed by: NURSE ANESTHETIST, CERTIFIED REGISTERED

## 2022-05-12 PROCEDURE — 25010000002 KETOROLAC TROMETHAMINE PER 15 MG: Performed by: ANESTHESIOLOGY

## 2022-05-12 PROCEDURE — 25010000002 ROPIVACAINE PER 1 MG: Performed by: ANESTHESIOLOGY

## 2022-05-12 DEVICE — IMPLANTABLE DEVICE: Type: IMPLANTABLE DEVICE | Site: FOOT | Status: FUNCTIONAL

## 2022-05-12 DEVICE — SCRW CORT LP SS 3.5X18MM: Type: IMPLANTABLE DEVICE | Site: FOOT | Status: FUNCTIONAL

## 2022-05-12 DEVICE — SCRW LK LP SS 3.5X14MM: Type: IMPLANTABLE DEVICE | Site: FOOT | Status: FUNCTIONAL

## 2022-05-12 DEVICE — PLT TUB LK THRD 4H: Type: IMPLANTABLE DEVICE | Site: FOOT | Status: FUNCTIONAL

## 2022-05-12 RX ORDER — HYDROMORPHONE HCL 110MG/55ML
PATIENT CONTROLLED ANALGESIA SYRINGE INTRAVENOUS AS NEEDED
Status: DISCONTINUED | OUTPATIENT
Start: 2022-05-12 | End: 2022-05-12 | Stop reason: SURG

## 2022-05-12 RX ORDER — MIDAZOLAM HYDROCHLORIDE 1 MG/ML
INJECTION INTRAMUSCULAR; INTRAVENOUS AS NEEDED
Status: DISCONTINUED | OUTPATIENT
Start: 2022-05-12 | End: 2022-05-12 | Stop reason: SURG

## 2022-05-12 RX ORDER — ONDANSETRON 2 MG/ML
4 INJECTION INTRAMUSCULAR; INTRAVENOUS ONCE AS NEEDED
Status: DISCONTINUED | OUTPATIENT
Start: 2022-05-12 | End: 2022-05-12 | Stop reason: HOSPADM

## 2022-05-12 RX ORDER — BUPIVACAINE HCL/0.9 % NACL/PF 0.1 %
2 PLASTIC BAG, INJECTION (ML) EPIDURAL ONCE
Status: COMPLETED | OUTPATIENT
Start: 2022-05-12 | End: 2022-05-12

## 2022-05-12 RX ORDER — HYDRALAZINE HYDROCHLORIDE 20 MG/ML
INJECTION INTRAMUSCULAR; INTRAVENOUS AS NEEDED
Status: DISCONTINUED | OUTPATIENT
Start: 2022-05-12 | End: 2022-05-12 | Stop reason: SURG

## 2022-05-12 RX ORDER — ROPIVACAINE HYDROCHLORIDE 5 MG/ML
INJECTION, SOLUTION EPIDURAL; INFILTRATION; PERINEURAL
Status: DISCONTINUED | OUTPATIENT
Start: 2022-05-12 | End: 2022-05-12 | Stop reason: SURG

## 2022-05-12 RX ORDER — ONDANSETRON 2 MG/ML
INJECTION INTRAMUSCULAR; INTRAVENOUS AS NEEDED
Status: DISCONTINUED | OUTPATIENT
Start: 2022-05-12 | End: 2022-05-12 | Stop reason: SURG

## 2022-05-12 RX ORDER — ROCURONIUM BROMIDE 10 MG/ML
INJECTION, SOLUTION INTRAVENOUS AS NEEDED
Status: DISCONTINUED | OUTPATIENT
Start: 2022-05-12 | End: 2022-05-12 | Stop reason: SURG

## 2022-05-12 RX ORDER — EPHEDRINE SULFATE 50 MG/ML
INJECTION, SOLUTION INTRAVENOUS AS NEEDED
Status: DISCONTINUED | OUTPATIENT
Start: 2022-05-12 | End: 2022-05-12 | Stop reason: SURG

## 2022-05-12 RX ORDER — LIDOCAINE HYDROCHLORIDE 5 MG/ML
INJECTION, SOLUTION INFILTRATION; INTRAVENOUS
Status: DISCONTINUED | OUTPATIENT
Start: 2022-05-12 | End: 2022-05-12 | Stop reason: SURG

## 2022-05-12 RX ORDER — FENTANYL CITRATE 50 UG/ML
INJECTION, SOLUTION INTRAMUSCULAR; INTRAVENOUS AS NEEDED
Status: DISCONTINUED | OUTPATIENT
Start: 2022-05-12 | End: 2022-05-12 | Stop reason: SURG

## 2022-05-12 RX ORDER — SUCCINYLCHOLINE CHLORIDE 20 MG/ML
INJECTION INTRAMUSCULAR; INTRAVENOUS AS NEEDED
Status: DISCONTINUED | OUTPATIENT
Start: 2022-05-12 | End: 2022-05-12 | Stop reason: SURG

## 2022-05-12 RX ORDER — SODIUM CHLORIDE, SODIUM GLUCONATE, SODIUM ACETATE, POTASSIUM CHLORIDE AND MAGNESIUM CHLORIDE 526; 502; 368; 37; 30 MG/100ML; MG/100ML; MG/100ML; MG/100ML; MG/100ML
INJECTION, SOLUTION INTRAVENOUS CONTINUOUS PRN
Status: DISCONTINUED | OUTPATIENT
Start: 2022-05-12 | End: 2022-05-12 | Stop reason: SURG

## 2022-05-12 RX ORDER — SODIUM CHLORIDE 9 MG/ML
1000 INJECTION, SOLUTION INTRAVENOUS CONTINUOUS
Status: DISCONTINUED | OUTPATIENT
Start: 2022-05-12 | End: 2022-05-12 | Stop reason: HOSPADM

## 2022-05-12 RX ORDER — OXYCODONE AND ACETAMINOPHEN 10; 325 MG/1; MG/1
1 TABLET ORAL EVERY 4 HOURS PRN
Qty: 30 TABLET | Refills: 0 | Status: SHIPPED | OUTPATIENT
Start: 2022-05-12 | End: 2022-05-17 | Stop reason: SDUPTHER

## 2022-05-12 RX ORDER — GABAPENTIN 300 MG/1
300 CAPSULE ORAL 3 TIMES DAILY
Qty: 30 CAPSULE | Refills: 0 | Status: SHIPPED | OUTPATIENT
Start: 2022-05-12 | End: 2022-05-31 | Stop reason: SDUPTHER

## 2022-05-12 RX ORDER — INSULIN ASPART 100 [IU]/ML
1 INJECTION, SOLUTION INTRAVENOUS; SUBCUTANEOUS ONCE
Status: DISCONTINUED | OUTPATIENT
Start: 2022-05-12 | End: 2022-05-12 | Stop reason: HOSPADM

## 2022-05-12 RX ORDER — KETOROLAC TROMETHAMINE 15 MG/ML
15 INJECTION, SOLUTION INTRAMUSCULAR; INTRAVENOUS ONCE
Status: COMPLETED | OUTPATIENT
Start: 2022-05-12 | End: 2022-05-12

## 2022-05-12 RX ORDER — PROPOFOL 10 MG/ML
VIAL (ML) INTRAVENOUS AS NEEDED
Status: DISCONTINUED | OUTPATIENT
Start: 2022-05-12 | End: 2022-05-12 | Stop reason: SURG

## 2022-05-12 RX ORDER — TRAMADOL HYDROCHLORIDE 50 MG/1
50 TABLET ORAL EVERY 6 HOURS PRN
COMMUNITY
End: 2022-08-10

## 2022-05-12 RX ADMIN — EPHEDRINE SULFATE 10 MG: 50 INJECTION INTRAVENOUS at 10:24

## 2022-05-12 RX ADMIN — HYDROMORPHONE HYDROCHLORIDE 0.5 MG: 1 INJECTION, SOLUTION INTRAMUSCULAR; INTRAVENOUS; SUBCUTANEOUS at 13:48

## 2022-05-12 RX ADMIN — SODIUM CHLORIDE 1000 ML: 9 INJECTION, SOLUTION INTRAVENOUS at 08:46

## 2022-05-12 RX ADMIN — LIDOCAINE HYDROCHLORIDE 30 ML: 5 INJECTION, SOLUTION INFILTRATION; INTRAVENOUS at 14:40

## 2022-05-12 RX ADMIN — ROCURONIUM BROMIDE 20 MG: 10 INJECTION INTRAVENOUS at 10:58

## 2022-05-12 RX ADMIN — PROPOFOL 150 MG: 10 INJECTION, EMULSION INTRAVENOUS at 09:56

## 2022-05-12 RX ADMIN — FENTANYL CITRATE 100 MCG: 50 INJECTION INTRAMUSCULAR; INTRAVENOUS at 09:53

## 2022-05-12 RX ADMIN — HYDRALAZINE HYDROCHLORIDE 5 MG: 20 INJECTION INTRAMUSCULAR; INTRAVENOUS at 12:12

## 2022-05-12 RX ADMIN — Medication 1 MG: at 11:18

## 2022-05-12 RX ADMIN — SUCCINYLCHOLINE CHLORIDE 100 MG: 20 INJECTION, SOLUTION INTRAMUSCULAR; INTRAVENOUS at 09:56

## 2022-05-12 RX ADMIN — MIDAZOLAM HYDROCHLORIDE 2 MG: 1 INJECTION, SOLUTION INTRAMUSCULAR; INTRAVENOUS at 09:45

## 2022-05-12 RX ADMIN — ROPIVACAINE HYDROCHLORIDE 30 ML: 5 INJECTION, SOLUTION EPIDURAL; INFILTRATION; PERINEURAL at 14:40

## 2022-05-12 RX ADMIN — KETOROLAC TROMETHAMINE 15 MG: 15 INJECTION, SOLUTION INTRAMUSCULAR; INTRAVENOUS at 14:00

## 2022-05-12 RX ADMIN — ONDANSETRON 4 MG: 2 INJECTION INTRAMUSCULAR; INTRAVENOUS at 10:28

## 2022-05-12 RX ADMIN — HYDROMORPHONE HYDROCHLORIDE 0.5 MG: 1 INJECTION, SOLUTION INTRAMUSCULAR; INTRAVENOUS; SUBCUTANEOUS at 13:28

## 2022-05-12 RX ADMIN — FENTANYL CITRATE 100 MCG: 50 INJECTION INTRAMUSCULAR; INTRAVENOUS at 10:39

## 2022-05-12 RX ADMIN — HYDROMORPHONE HYDROCHLORIDE 0.5 MG: 1 INJECTION, SOLUTION INTRAMUSCULAR; INTRAVENOUS; SUBCUTANEOUS at 13:37

## 2022-05-12 RX ADMIN — SODIUM CHLORIDE, SODIUM GLUCONATE, SODIUM ACETATE, POTASSIUM CHLORIDE AND MAGNESIUM CHLORIDE: 526; 502; 368; 37; 30 INJECTION, SOLUTION INTRAVENOUS at 11:44

## 2022-05-12 RX ADMIN — Medication 2 G: at 10:03

## 2022-05-12 RX ADMIN — ROCURONIUM BROMIDE 30 MG: 10 INJECTION INTRAVENOUS at 10:02

## 2022-05-12 NOTE — ANESTHESIA PROCEDURE NOTES
Airway  Urgency: elective    Date/Time: 5/12/2022 9:58 AM  End Time:5/12/2022 9:58 AM  Airway not difficult    General Information and Staff    Patient location during procedure: OR  CRNA/CAA: Shanthi Shore CRNA    Indications and Patient Condition  Indications for airway management: airway protection    Preoxygenated: yes  Mask difficulty assessment: 0 - not attempted    Final Airway Details  Final airway type: endotracheal airway      Successful airway: ETT  Cuffed: yes   Successful intubation technique: video laryngoscopy  Facilitating devices/methods: intubating stylet  Endotracheal tube insertion site: oral  Blade: Engel  Blade size: 3  ETT size (mm): 7.0  Cormack-Lehane Classification: grade I - full view of glottis  Placement verified by: chest auscultation and capnometry   Cuff volume (mL): 6  Measured from: lips  ETT/EBT  to lips (cm): 20  Number of attempts at approach: 1  Assessment: lips, teeth, and gum same as pre-op and atraumatic intubation

## 2022-05-12 NOTE — ANESTHESIA PROCEDURE NOTES
Peripheral Block      Patient reassessed immediately prior to procedure    Patient location during procedure: post-op  Start time: 5/12/2022 2:30 PM  Stop time: 5/12/2022 2:40 PM  Reason for block: at surgeon's request, post-op pain management and secondary anesthetic  Performed by  Anesthesiologist: Hermelinda Carnes DO  Preanesthetic Checklist  Completed: patient identified, IV checked, site marked, risks and benefits discussed, surgical consent, monitors and equipment checked, pre-op evaluation and timeout performed  Prep:  Pt Position: supine  Sterile barriers:cap, gloves and sterile barriers  Prep: ChloraPrep  Patient monitoring: blood pressure monitoring, continuous pulse oximetry and EKG  Procedure  Performed under: PNB  Guidance:ultrasound guided    ULTRASOUND INTERPRETATION.  Using ultrasound guidance a 21 G gauge needle was placed in close proximity to the sciatic nerve, at which point, under ultrasound guidance anesthetic was injected in the area of the nerve and spread of the anesthesia was seen on ultrasound in close proximity thereto.  There were no abnormalities seen on ultrasound; a digital image was taken; and the patient tolerated the procedure with no complications. Images:still images obtained, printed/placed on chart    Laterality:right  Block Type:popliteal  Injection Technique:single-shot  Needle Type:echogenic  Needle Gauge:21 G  Resistance on Injection: none  Catheter Size:20 G  Cath Depth at skin: 5 cm    Medications Used: lidocaine PF (XYLOCAINE) 0.5 % injection, 30 mL  ropivacaine (NAROPIN) 0.5 % injection, 30 mL      Medications  Comment:Pt was in severe pain in PACU that was not controlled with IV pain medication. Pt would like to receive peripheral nerve block and understands the risks.   A decision was made to do a popliteal block to help with the pts pain.        Post Assessment  Injection Assessment: negative aspiration for heme, no paresthesia on injection and incremental  injection  Patient Tolerance:comfortable throughout block  Complications:no  Additional Notes  Pt & side identified  U/S used throughout and needle seen throughout  No complications  Pt tolerated procedure well

## 2022-05-12 NOTE — INTERVAL H&P NOTE
H&P reviewed. The patient was examined and there are no changes to the H&P.            This document has been electronically signed by Familia Infante DPM on May 12, 2022 09:22 CDT

## 2022-05-12 NOTE — DISCHARGE INSTRUCTIONS
Discharge home  Resume normal diet  Keep dressing and splint c/d/I  Ice and elevate   Nonweight bearing to Right lower extremity  WBAT in cam boot LLE  Percocet 7.5mg prn pain   Gabapentin 300mg  Follow up in clinic in 1 week          This document has been electronically signed by aFmilia Infante DPM on May 12, 2022 13:04 CDT

## 2022-05-12 NOTE — BRIEF OP NOTE
ANKLE OPEN REDUCTION INTERNAL FIXATION  Progress Note    Amanda Padron  5/12/2022    Pre-op Diagnosis:   Closed trimalleolar fracture of right ankle, initial encounter [S82.851A]  Syndesmotic disruption of left ankle, initial encounter [S93.432A]       Post-Op Diagnosis Codes:     * Closed trimalleolar fracture of right ankle, initial encounter [S82.851A]     * Syndesmotic disruption of left ankle, initial encounter [S93.432A]    Procedure/CPT® Codes:        Procedure(s):  Open reduction internal fixation of right trimalleolar ankle fracture and intraoperative fluoroscopic evaluation of left ankle.    Surgeon(s):  Familia Infante DPM    Anesthesia: General    Staff:   Circulator: Paty Nunez RN  Scrub Person: Catalino Mancia  Vendor Representative: Naima Green  Assistant: Claudia Mckinney MA  Assistant: Claudia Mckinney MA      Estimated Blood Loss: minimal    Urine Voided: * No values recorded between 5/12/2022  9:48 AM and 5/12/2022 12:49 PM *    Specimens:                None          Drains: * No LDAs found *    Findings: consistent with pre-op dx, left syndesmosis stable        Complications: none     Assistant: Claudia Mckinney MA  was responsible for performing the following activities: Retraction, Suction and Irrigation and their skilled assistance was necessary for the success of this case.    Familia Infante DPM     Date: 5/12/2022  Time: 13:01 CDT

## 2022-05-12 NOTE — ANESTHESIA POSTPROCEDURE EVALUATION
Patient: Amanda Padron    Procedure Summary     Date: 05/12/22 Room / Location: [facility] OR  / [facility] OR    Anesthesia Start: 0950 Anesthesia Stop: 1308    Procedure: ANKLE OPEN REDUCTION INTERNAL FIXATION (Bilateral Foot) Diagnosis:       Closed trimalleolar fracture of right ankle, initial encounter      Syndesmotic disruption of left ankle, initial encounter      (Closed trimalleolar fracture of right ankle, initial encounter [S82.851A])      (Syndesmotic disruption of left ankle, initial encounter [S93.432A])    Surgeons: Familia Infante DPM Provider: Yvon Murry MD    Anesthesia Type: general ASA Status: 3          Anesthesia Type: general    Vitals  Vitals Value Taken Time   BP 99/47 05/12/22 1304   Temp 96.6 °F (35.9 °C) 05/12/22 1304   Pulse 98 05/12/22 1304   Resp 18 05/12/22 1304   SpO2 98 % 05/12/22 1304           Post Anesthesia Care and Evaluation    Patient location during evaluation: PACU  Patient participation: complete - patient participated  Level of consciousness: sleepy but conscious  Pain management: adequate  Airway patency: patent  Anesthetic complications: No anesthetic complications  PONV Status: none  Cardiovascular status: hemodynamically stable  Respiratory status: spontaneous ventilation and face mask  Hydration status: acceptable    Comments: 99/66 72 16 95%

## 2022-05-12 NOTE — ADDENDUM NOTE
Addendum  created 05/12/22 1444 by Hermelinda Carnes, DO    Child order released for a procedure order, Clinical Note Signed, Intraprocedure Blocks edited

## 2022-05-12 NOTE — ANESTHESIA PREPROCEDURE EVALUATION
Anesthesia Evaluation     no history of anesthetic complications:  NPO Solid Status: > 8 hours  NPO Liquid Status: > 2 hours           Airway   Mallampati: I  TM distance: >3 FB  Neck ROM: full  No difficulty expected  Dental    (+) poor dentition    Pulmonary - normal exam    breath sounds clear to auscultation  (-) COPD, asthma, sleep apnea, not a smoker    ROS comment: snores  Cardiovascular   Exercise tolerance: good (4-7 METS)    Rhythm: regular  Rate: normal    (+) hypertension 2 medications or greater, murmur, hyperlipidemia,   (-) valvular problems/murmurs, dysrhythmias, angina, cardiac stents, DVT    ROS comment: · Left ventricular systolic function is hyperdynamic (EF > 70%).  · Left ventricular diastolic function was normal.  · Left ventricular wall thickness is consistent with moderate concentric hypertrophy.        Neuro/Psych  (+) headaches (occ), numbness (plantar fasciitis), psychiatric history Anxiety and Depression,    (-) seizures, TIA, CVA, weakness  GI/Hepatic/Renal/Endo    (+) obesity,  GERD well controlled,  renal disease CRI, diabetes mellitus (glu 199) type 1 well controlled using insulin,   (-) hepatitis, liver disease, no thyroid disorder    Musculoskeletal     (+) arthralgias,   Abdominal    Substance History   (-) alcohol use, drug use     OB/GYN    (-)  Pregnant        Other   arthritis (hands and back), blood dyscrasia anemia,     (-) history of cancer  ROS/Med Hx Other: Pain currently 7/10                  Anesthesia Plan    ASA 3     general     intravenous induction     Anesthetic plan, all risks, benefits, and alternatives have been provided, discussed and informed consent has been obtained with: patient and spouse/significant other.  Use of blood products discussed with patient  Consented to blood products.       CODE STATUS:

## 2022-05-13 NOTE — OP NOTE
Date of Procedure: 05/12/2022     SURGEON: Familia Infante DPM      Assistant: Claudia Mckinney MA was responsible for performing the following activities: Retraction, Suction, Irrigation and Placing Dressing and their skilled assistance was necessary for the success of this case.      PREOPERATIVE DIAGNOSIS:   1.  Right trimalleolar ankle fracture  2.  Syndesmotic injury left lower extremity     POSTOPERATIVE DIAGNOSIS:   1.  Right trimalleolar ankle fracture     PROCEDURES PERFORMED:   1.  Open reduction and internal fixation of right trimalleolar ankle fracture  2.  Manipulation left ankle under anesthesia     ANESTHESIA: General anesthesia     HEMOSTASIS: Pneumatic thigh tourniquet set at 300 mmHg.      ESTIMATED BLOOD LOSS: 50 mL.     SPECIMEN: none     MATERIALS: Arthrex one third tubular plate x2, 4.0 partially-threaded cannulated screw x2     INJECTABLES:none      COMPLICATIONS: None.      CONDITION: Stable.        INDICATIONS FOR PROCEDURE: Pleasant 51-year-old female presents for scheduled open reduction internal fixation of right trimalleolar ankle fracture..  She agrees to undergo the surgical intervention at this time. Consent is signed and documented in the chart. History and physical exam were reviewed prior to patient being taken to the operating room and n.p.o. status was confirmed. No guarantees were given or implied regarding the outcome of this treatment.      DESCRIPTION OF PROCEDURE: After mild sedation was administered by the anesthesia team in the preoperative holding area the patient was transported to the operating room.  General anesthesia was then administered and placed in a prone position.  The right lower extremity was prepped and draped in usual sterile technique and a timeout was performed to confirm the correct patient, correct site, and correct procedure.      Attention was then directed to the right lower extremity which was exsanguinated using Esmarch bandage and tourniquet  Inflated to 300 mmHg.  Next a longitudinal incision was made midway between of the Achilles tendon and the posterior border of the fibula.  Dissection was carried through subcutaneous tissue taking care to identify and retract all vital neurovascular structures.  Plane developed lateral to the peroneal muscles and dissection carried down to the posterior fibula where the fracture was identifie.  All soft tissues interposed in the fracture site were debrided out.  Fracture was then reduced with a point-to-point bone reduction forcep and fixated with a posterior lateral one third tubular plate with locking and nonlocking screws.  Intraoperative fluoroscopy was used throughout the entire process to ensure the fracture was well reduced and the hardware is well-placed.    Dissection then carried medial to the peroneal muscles.  Flexor hallucis longus muscle belly was elevated off the interosseous membrane and the posterior border of the tibia.  Fracture was identified.  All soft tissues interposed in the fracture site were curetted out.  Fracture was reduced by maximally dorsiflexing the ankle and temporary reduction was held with a Rogelio wire.  Intraoperative fluoroscopy was used to ensure that adequate reduction was obtained.  The fracture was fixated with a one third tubular plate pre-bent and fixated with nonlocking screws.    Next attention was directed medially where the medial malleolus fracture was already well reduced.  It was fixated with 2 parallel partially-threaded 4.0 partially-threaded cannulated screw was inserted percutaneously.  Again intraoperative fluoroscopy was used to entire process to ensure that fracture remained well reduced and the hardware is well-placed.  At This Time Incision Site Was Flushed with Copious Amounts of Normal Saline and Closed in Layers with 3-0 Vicryl and Staples.  The tourniquet was deflated with a rapid hyperemic response noted to the distal digits.  Sterile dressing  followed by well-padded posterior mold splint with stirrups was applied.    Next all after drapes were removed fluoroscopy was brought back in and the left ankle/syndesmosis was stressed and noted to be stable requiring no internal fixation.      Patient tolerated the procedure and anesthesia well and was transported from the operating room to the PACU with vital signs stable and neurovascular status intact to the operative extremity.  Plan to discharge patient home once stable per anesthesia.  She can resume her normal diet.  She can be partial weightbearing to the left lower extremity and nonweightbearing to the right lower extremity.  She will follow-up in clinic within 1 week.            This document has been electronically signed by Familia Infante DPM on May 13, 2022 10:50 CDT

## 2022-05-16 ENCOUNTER — TELEPHONE (OUTPATIENT)
Dept: ENDOCRINOLOGY | Facility: CLINIC | Age: 52
End: 2022-05-16

## 2022-05-17 ENCOUNTER — TELEMEDICINE (OUTPATIENT)
Dept: ENDOCRINOLOGY | Facility: CLINIC | Age: 52
End: 2022-05-17

## 2022-05-17 ENCOUNTER — DOCUMENTATION (OUTPATIENT)
Dept: ENDOCRINOLOGY | Facility: CLINIC | Age: 52
End: 2022-05-17

## 2022-05-17 ENCOUNTER — READMISSION MANAGEMENT (OUTPATIENT)
Dept: CALL CENTER | Facility: HOSPITAL | Age: 52
End: 2022-05-17

## 2022-05-17 ENCOUNTER — OFFICE VISIT (OUTPATIENT)
Dept: PODIATRY | Facility: CLINIC | Age: 52
End: 2022-05-17

## 2022-05-17 VITALS — HEART RATE: 84 BPM | HEIGHT: 63 IN | OXYGEN SATURATION: 100 % | WEIGHT: 174 LBS | BODY MASS INDEX: 30.83 KG/M2

## 2022-05-17 DIAGNOSIS — E10.649 TYPE 1 DIABETES MELLITUS WITH HYPOGLYCEMIA UNAWARENESS: Primary | ICD-10-CM

## 2022-05-17 DIAGNOSIS — E11.59 HYPERTENSION ASSOCIATED WITH DIABETES: ICD-10-CM

## 2022-05-17 DIAGNOSIS — S82.851D CLOSED TRIMALLEOLAR FRACTURE OF RIGHT ANKLE WITH ROUTINE HEALING, SUBSEQUENT ENCOUNTER: Primary | ICD-10-CM

## 2022-05-17 DIAGNOSIS — E10.69 MIXED DIABETIC HYPERLIPIDEMIA ASSOCIATED WITH TYPE 1 DIABETES MELLITUS: ICD-10-CM

## 2022-05-17 DIAGNOSIS — I15.2 HYPERTENSION ASSOCIATED WITH DIABETES: ICD-10-CM

## 2022-05-17 DIAGNOSIS — E78.2 MIXED DIABETIC HYPERLIPIDEMIA ASSOCIATED WITH TYPE 1 DIABETES MELLITUS: ICD-10-CM

## 2022-05-17 DIAGNOSIS — E55.9 VITAMIN D DEFICIENCY: ICD-10-CM

## 2022-05-17 PROCEDURE — 99214 OFFICE O/P EST MOD 30 MIN: CPT | Performed by: INTERNAL MEDICINE

## 2022-05-17 PROCEDURE — 99024 POSTOP FOLLOW-UP VISIT: CPT | Performed by: PODIATRIST

## 2022-05-17 PROCEDURE — 29405 APPL SHORT LEG CAST: CPT | Performed by: PODIATRIST

## 2022-05-17 RX ORDER — INSULIN GLARGINE 300 U/ML
30 INJECTION, SOLUTION SUBCUTANEOUS
Qty: 6 ML | Refills: 11 | Status: SHIPPED | OUTPATIENT
Start: 2022-05-17

## 2022-05-17 RX ORDER — OXYCODONE AND ACETAMINOPHEN 10; 325 MG/1; MG/1
1 TABLET ORAL EVERY 4 HOURS PRN
Qty: 30 TABLET | Refills: 0 | Status: SHIPPED | OUTPATIENT
Start: 2022-05-17 | End: 2022-08-10

## 2022-05-17 NOTE — OUTREACH NOTE
Medical Week 1 Survey    Flowsheet Row Responses   [facility] patient discharged from? [facility]   Does the patient have one of the following disease processes/diagnoses(primary or secondary)? Other   Week 1 attempt successful? Yes   Call start time 1030   Call end time 1035   Discharge diagnosis Displaced Maisonneuve fracture of left lower extremity, Closed trimalleolar fracture of right ankle, S/P Closed reduction of the right ankle   Is patient permission given to speak with other caregiver? Yes   List who call center can speak with    Meds reviewed with patient/caregiver? Yes   Is the patient having any side effects they believe may be caused by any medication additions or changes? No   Does the patient have all medications ordered at discharge? Yes   Is the patient taking all medications as directed (includes completed medication regime)? Yes   Does the patient have a primary care provider?  Yes   Does the patient have an appointment with their PCP within 7 days of discharge? Yes   Has the patient kept scheduled appointments due by today? Yes   DME comments Using tall boot on left foot, blue support device.    Psychosocial issues? No   Comments Pain is well controlled per pt. Unable to bear wt on left yet, non-wt bearing on right at all in cast. Cast in place currently on right and boot on left, keeping elevated as much as possible.    Did the patient receive a copy of their discharge instructions? Yes   Nursing interventions Reviewed instructions with patient   What is the patient's perception of their health status since discharge? Improving   Is the patient/caregiver able to teach back signs and symptoms related to disease process for when to call PCP? Yes   Is the patient/caregiver able to teach back signs and symptoms related to disease process for when to call 911? Yes   Is the patient/caregiver able to teach back the hierarchy of who to call/visit for symptoms/problems? PCP, Specialist,  Home health nurse, Urgent Care, ED, 911 Yes   If the patient is a current smoker, are they able to teach back resources for cessation? Not a smoker   Week 1 call completed? Yes          HORACE LOMELI - Registered Nurse

## 2022-05-17 NOTE — PROGRESS NOTES
Amanda Padron is a 51 y.o. female who presents for  evaluation of   Type 1 diabetes                                       This was a Telehealth Encounter. Benefits and Disadvantages of a Telehealth Visit were discussed and accepted by patient. .  Patient agreed to receive service through Telehealth visit as patient is being compliant with social distancing recommendations imparted by CDC.     You have chosen to receive care through a telehealth visit.  Do you consent to use a video/audio connection for your medical care today? Yes          Primary Care / Referring Provider  Mindy Serrano APRN    History of Present Illness  Duration/Timing:  Diabetes mellitus type 1, Age at onset of diabetes: 11 years    Severity (Complications/Hospitalizations)  Secondary Macrovascular Complications:  No CAD, No CVA, No PAD  Secondary Microvascular Complications:  Diabetic Nephropathy , Diabetic Retinopathy, No Diabetic Neuropathy      Blood Glucose Readings  both hypo and hyperglycemia  Hypoglycemia worsened by Tresiba , she had syncope and fractured her legs   We sent rx for Toujeo but insurance denied  Broken legs should be enough for them to do formulary exceptions     Diet  variable carb intake       Associated Signs/Symptoms  Hyperglycemic Symptoms:  No polyuria, No polydipsia, No polyphagia, Blurred vision  Hypoglycemic Episodes:  Documented symptomatic hypoglycemia, Hypoglycemic unawareness, Seizures and syncope related to hypoglycemia          PE    There were no vitals taken for this visit.  AOx3  No visible goiter  Normal Respiratory Effort , Lung CTA  RRR  Diastolic murmur  No Edema    Labs      Lab Results   Component Value Date    WBC 6.50 05/07/2022    HGB 9.9 (L) 05/07/2022    HCT 29.6 (L) 05/07/2022    MCV 86.8 05/07/2022     05/07/2022     Lab Results   Component Value Date    GLUCOSE 292 (H) 05/07/2022    BUN 24 (H) 05/07/2022    CREATININE 1.40 (H) 05/07/2022    EGFRIFNONA 45 (L) 10/16/2018    BCR  17.1 05/07/2022    K 4.3 05/07/2022    CO2 23.0 05/07/2022    CALCIUM 9.2 05/07/2022    ALBUMIN 3.80 05/07/2022    AST 18 05/07/2022    ALT 13 05/07/2022                 Assessment & Plan       ICD-10-CM ICD-9-CM   1. 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 Hypertension associated with diabetes (HCC)  E11.59 250.80    I15.2 401.9   3. Mixed diabetic hyperlipidemia associated with type 1 diabetes mellitus (HCC)  E10.69 250.81    E78.2 272.2   4. Vitamin D deficiency  E55.9 268.9       Glycemic Mgmt    Lab Results   Component Value Date    HGBA1C 8.8 09/03/2019    HGBA1C 8.3 (H) 10/16/2018    HGBA1C 7.9 (H) 03/09/2018     Lab Results   Component Value Date    MICROALBUR 2.8 10/16/2018    CREATININE 1.40 (H) 05/07/2022     Can't use lantus , tresiba or basaglar, profound hypoglycemia and seizures    No carb counting    She gives insulin based on experience and doesn't like change so she is still using syringe and vials    Has been using rashad 2   Over the phone    so not available       Toujeo 30 prescribed again     Humalog   15 with meals but she adjusts between 10 and 18  It seems that she does a carb ratio of 5 and I suggested to try it and self titration explained.  I sugggested sensitivity of 40     She will let me know when she is ready for omnipod 5                                                                                                                                                                                                                                                                                                                                                                                                                                                                                                                                                          Lipid Management    Sept 2021    LDL 63  HDL 68    Lipitor 40 mg tablet, 1 1/2 tabs daily ---  decrease to 40       Blood Pressure Management:       There were no vitals filed for this visit.        Microvascular Complication Monitoring:       No Diabetic Neuropathy  States takes neurontin for plantar fasciitis ?    following with Dr. Moore in [place] has DR    John Subramanian before but insurance changed     On lasix 20 mg qod - changed to 10 mg qod   On lisinopril 10 mg daily  ( from 20 mg qd ) decided by Moris    I will keep this regimen     Monitor PTH and Red Cell count   If decline in GFR or development of anemia, hyperparathyroidism refer back     Sept 2021 , GFR 41   Sept 2021 alb / creat ratio 13     Immunizations:  Patient prefers not to receive influenza immunization, Patient prefers not to receive pneumococcal immunization  Didn't take COVID vaccine    Preventive Care:  Patient is not smoking    Bone Health    Vit D Def  can't tolerate Vit D3 but can tolerate vit D2    On 50 th u every weeky in the past and levels became high    Off treatment  Vit D from Sept 2021 is 30     Other    neg celiac panel in  2016   Thyroid nl from Sept 2021  B12 elevated    Murmur , asymptomatic, diastolic to my impression , follow clinically

## 2022-05-17 NOTE — PROGRESS NOTES
Amanda Padron  1970  51 y.o. female   PCP: 2022  BC: 206 per pt      2022     Chief Complaint   Patient presents with   • Left Foot - Pain, Follow-up   • Right Foot - Pain, Follow-up       History of Present Illness    Amanda Padron is a 51 y.o.female presents to clinic today for her first post op appointment. She has an ankle ORIF on 22.     Past Medical History:   Diagnosis Date   • Acute conjunctivitis    • Arthritis    • Brittle diabetes mellitus (HCC)     not controlled due to hypoglycemia      • CKD (chronic kidney disease)    • Diabetic retinopathy (HCC)    • Dyslipidemia    • GERD (gastroesophageal reflux disease)    • Hypertensive disorder    • Vitamin D deficiency          Past Surgical History:   Procedure Laterality Date   •  SECTION      with tubal   • DILATATION AND CURETTAGE           Family History   Problem Relation Age of Onset   • Diabetes Sister    • Coronary artery disease Other    • Diabetes Other    • Hypertension Other        Allergies   Allergen Reactions   • Altace [Ramipril] Arrhythmia   • Basaglar Kwikpen [Insulin Glargine] Other (See Comments)     Lantus   • Tresiba [Insulin Degludec] Other (See Comments)     Hypoglycemia         Social History     Socioeconomic History   • Marital status:    Tobacco Use   • Smoking status: Never Smoker   • Smokeless tobacco: Never Used   Vaping Use   • Vaping Use: Never used   Substance and Sexual Activity   • Alcohol use: Yes     Comment: occasionally   • Drug use: Not Currently   • Sexual activity: Defer         Current Outpatient Medications   Medication Sig Dispense Refill   • acetaminophen (TYLENOL) 650 MG 8 hr tablet Take 650 mg by mouth Every 8 (Eight) Hours As Needed for Mild Pain .     • apixaban (ELIQUIS) 2.5 MG tablet tablet Take 1 tablet by mouth Every 12 (Twelve) Hours. 60 tablet 0   • atorvastatin (LIPITOR) 40 MG tablet Take 60 mg by mouth Every Night.     • buPROPion (WELLBUTRIN) 100 MG  tablet Take 100 mg by mouth 2 (Two) Times a Day.     • busPIRone (BUSPAR) 5 MG tablet Take 5 mg by mouth 2 (Two) Times a Day.     • cetirizine (zyrTEC) 10 MG tablet Take 10 mg by mouth Daily.     • Continuous Blood Gluc Sensor (Dexcom G6 Sensor) As Needed (glucose control). Every 10 daysDexcom G6 Sensor Kit [address]-59 Use as directed for continuous glucose monitoring 9 each 3   • Continuous Blood Gluc Sensor (FreeStyle Marquis 2 Sensor) misc 1 each Every 14 (Fourteen) Days. 2 each 4   • famotidine (PEPCID) 40 MG tablet Take 40 mg by mouth Every Night.     • furosemide (LASIX) 20 MG tablet Take 10 mg by mouth Every Other Day.     • gabapentin (NEURONTIN) 100 MG capsule Take 100 mg by mouth Every Night.     • gabapentin (Neurontin) 300 MG capsule Take 1 capsule by mouth 3 (Three) Times a Day. 30 capsule 0   • Glucagon (Gvoke HypoPen 2-Pack) 1 MG/0.2ML solution auto-injector Inject 1 mg under the skin into the appropriate area as directed As Needed (for hypoglycemia). 0.4 mL 1   • Glucose Blood (Blood Glucose Test) strip Use 4 x daily use any brand covered by insurance or same brand as before ICD10 code is E11.9 120 each 11   • hydrALAZINE (APRESOLINE) 50 MG tablet Take 50 mg by mouth 2 (Two) Times a Day.     • Insulin Glargine, 2 Unit Dial, (Toujeo Max SoloStar) 300 UNIT/ML solution pen-injector injection Inject 30 Units under the skin into the appropriate area as directed every night at bedtime. 6 mL 11   • Insulin Lispro, 1 Unit Dial, (HUMALOG) 100 UNIT/ML solution pen-injector Inject 0-20 Units under the skin into the appropriate area as directed 3 (Three) Times a Day.     • Insulin Pen Needle (Advocate Insulin Pen Needles) 31G X 5 MM misc Use to inject insulin 4 times per day 200 each 11   • Insulin Pen Needle (B-D ULTRAFINE III SHORT PEN) 31G X 8 MM misc Use to inject insulin 4 times per day 120 each 11   • Lancets Misc. (Accu-Chek Multiclix Lancet Dev) kit Provide lancing device and 5 lancets per day use as  "needed 150 each 11   • lisinopril (PRINIVIL,ZESTRIL) 10 MG tablet Take 1 tablet by mouth Daily. 30 tablet 11   • oxyCODONE-acetaminophen (Percocet)  MG per tablet Take 1 tablet by mouth Every 4 (Four) Hours As Needed for Moderate Pain. 30 tablet 0   • sertraline (ZOLOFT) 100 MG tablet Take 100 mg by mouth 2 (Two) Times a Day.     • tiZANidine (ZANAFLEX) 4 MG tablet Take 4 mg by mouth At Night As Needed for Muscle Spasms.     • traMADol (ULTRAM) 50 MG tablet Take 50 mg by mouth Every 6 (Six) Hours As Needed for Moderate Pain .       No current facility-administered medications for this visit.       Review of Systems   Constitutional: Negative.    HENT: Negative.    Eyes: Negative.    Respiratory: Negative.    Cardiovascular: Negative.    Gastrointestinal: Negative.    Endocrine: Negative.    Genitourinary: Negative.    Musculoskeletal:        Foot pain  Ankle pain   Skin: Negative.    Allergic/Immunologic: Negative.    Neurological: Negative.    Hematological: Negative.    Psychiatric/Behavioral: Negative.          OBJECTIVE    Pulse 84   Ht 160 cm (63\")   Wt 78.9 kg (174 lb)   SpO2 100%   BMI 30.82 kg/m²     Physical Exam     Right lower extremity.  Incision sites well approximated.  No signs of dehiscence.  Moderate edema and ecchymosis.  Negative Homans.    Procedures        ASSESSMENT AND PLAN    Diagnoses and all orders for this visit:    1. Closed trimalleolar fracture of right ankle with routine healing, subsequent encounter (Primary)  -     oxyCODONE-acetaminophen (Percocet)  MG per tablet; Take 1 tablet by mouth Every 4 (Four) Hours As Needed for Moderate Pain.  Dispense: 30 tablet; Refill: 0        -Patient doing well postoperatively.  Dressing change.  Applied nonweightbearing short leg fiberglass cast.  Refill pain medicine.  Recheck 2 weeks, repeat radiographs        This document has been electronically signed by Familia Infante DPM on May 19, 2022 15:02 CDT     5/19/2022  15:02 CDT    "

## 2022-05-24 LAB
QT INTERVAL: 362 MS
QTC INTERVAL: 450 MS

## 2022-05-25 ENCOUNTER — READMISSION MANAGEMENT (OUTPATIENT)
Dept: CALL CENTER | Facility: HOSPITAL | Age: 52
End: 2022-05-25

## 2022-05-25 NOTE — OUTREACH NOTE
Medical Week 2 Survey    Flowsheet Row Responses   [facility], [place] [facility] patient discharged from? [facility]   Does the patient have one of the following disease processes/diagnoses(primary or secondary)? Other   Week 2 attempt successful? Yes   Call start time 1012   Discharge diagnosis Displaced Maisonneuve fracture of left lower extremity, Closed trimalleolar fracture of right ankle, S/P Closed reduction of the right ankle   Call end time 1014   Person spoke with today (if not patient) and relationship Nael-   Meds reviewed with patient/caregiver? Yes   Is the patient having any side effects they believe may be caused by any medication additions or changes? No   Is the patient taking all medications as directed (includes completed medication regime)? Yes   Comments regarding appointments Podiatry appt on 5/31   Does the patient have a primary care provider?  Yes   Has the patient kept scheduled appointments due by today? Yes   Has home health visited the patient within 72 hours of discharge? N/A   Psychosocial issues? No   Comments Pt uses a walking boot on left foot when ambulating and still has a hard cast on right foot.   What is the patient's perception of their health status since discharge? Improving   Is the patient/caregiver able to teach back the hierarchy of who to call/visit for symptoms/problems? PCP, Specialist, Home health nurse, Urgent Care, ED, 911 Yes   Week 2 Call Completed? Yes          ELENA OATES - Registered Nurse

## 2022-05-27 ENCOUNTER — DOCUMENTATION (OUTPATIENT)
Dept: PHARMACY | Facility: TELEHEALTH | Age: 52
End: 2022-05-27

## 2022-05-31 ENCOUNTER — OFFICE VISIT (OUTPATIENT)
Dept: PODIATRY | Facility: CLINIC | Age: 52
End: 2022-05-31

## 2022-05-31 ENCOUNTER — TELEPHONE (OUTPATIENT)
Dept: PODIATRY | Facility: CLINIC | Age: 52
End: 2022-05-31

## 2022-05-31 VITALS — OXYGEN SATURATION: 98 % | BODY MASS INDEX: 30.83 KG/M2 | HEIGHT: 63 IN | HEART RATE: 74 BPM | WEIGHT: 174 LBS

## 2022-05-31 DIAGNOSIS — S82.851D CLOSED TRIMALLEOLAR FRACTURE OF RIGHT ANKLE WITH ROUTINE HEALING, SUBSEQUENT ENCOUNTER: Primary | ICD-10-CM

## 2022-05-31 DIAGNOSIS — S82.851A CLOSED TRIMALLEOLAR FRACTURE OF RIGHT ANKLE, INITIAL ENCOUNTER: ICD-10-CM

## 2022-05-31 PROCEDURE — 99024 POSTOP FOLLOW-UP VISIT: CPT | Performed by: PODIATRIST

## 2022-05-31 RX ORDER — GABAPENTIN 300 MG/1
300 CAPSULE ORAL 3 TIMES DAILY
Qty: 30 CAPSULE | Refills: 0 | Status: SHIPPED | OUTPATIENT
Start: 2022-05-31

## 2022-05-31 RX ORDER — HYDROCODONE BITARTRATE AND ACETAMINOPHEN 10; 325 MG/1; MG/1
1 TABLET ORAL EVERY 8 HOURS PRN
Qty: 21 TABLET | Refills: 0 | Status: SHIPPED | OUTPATIENT
Start: 2022-05-31 | End: 2022-08-10

## 2022-05-31 NOTE — TELEPHONE ENCOUNTER
Pt wasn't sure if you were going to refill her gabapentin 300 mg? If so she uses the outpatient pharmacy here.

## 2022-05-31 NOTE — PROGRESS NOTES
Amanda Padron  1970  51 y.o. female   PCP: 2022  BC: 206 per pt    2022      Chief Complaint   Patient presents with   • Right Foot - Pain, Follow-up   • Left Foot - Pain, Follow-up       History of Present Illness    Amanda Padron is a 51 y.o.female presents to clinic today for her  post op appointment. She has an ankle ORIF on 22.     Past Medical History:   Diagnosis Date   • Acute conjunctivitis    • Arthritis    • Brittle diabetes mellitus (HCC)     not controlled due to hypoglycemia      • CKD (chronic kidney disease)    • Diabetic retinopathy (HCC)    • Dyslipidemia    • GERD (gastroesophageal reflux disease)    • Hypertensive disorder    • Vitamin D deficiency          Past Surgical History:   Procedure Laterality Date   • ANKLE OPEN REDUCTION INTERNAL FIXATION Right 2022    Procedure: rightANKLE OPEN REDUCTION INTERNAL FIXATION;  Surgeon: Familia Infante DPM;  Location: [facility];  Service: Podiatry;  Laterality: Right;   •  SECTION      with tubal   • DILATATION AND CURETTAGE           Family History   Problem Relation Age of Onset   • Diabetes Sister    • Coronary artery disease Other    • Diabetes Other    • Hypertension Other        Allergies   Allergen Reactions   • Altace [Ramipril] Arrhythmia   • Basaglar Kwikpen [Insulin Glargine] Other (See Comments)     Lantus   • Tresiba [Insulin Degludec] Other (See Comments)     Hypoglycemia         Social History     Socioeconomic History   • Marital status:    Tobacco Use   • Smoking status: Never Smoker   • Smokeless tobacco: Never Used   Vaping Use   • Vaping Use: Never used   Substance and Sexual Activity   • Alcohol use: Yes     Comment: occasionally   • Drug use: Not Currently   • Sexual activity: Defer         Current Outpatient Medications   Medication Sig Dispense Refill   • acetaminophen (TYLENOL) 650 MG 8 hr tablet Take 650 mg by mouth Every 8 (Eight) Hours As Needed for Mild Pain .     • apixaban  (ELIQUIS) 2.5 MG tablet tablet Take 1 tablet by mouth Every 12 (Twelve) Hours. 60 tablet 0   • atorvastatin (LIPITOR) 40 MG tablet Take 60 mg by mouth Every Night.     • buPROPion (WELLBUTRIN) 100 MG tablet Take 100 mg by mouth 2 (Two) Times a Day.     • busPIRone (BUSPAR) 5 MG tablet Take 5 mg by mouth 2 (Two) Times a Day.     • cetirizine (zyrTEC) 10 MG tablet Take 10 mg by mouth Daily.     • Continuous Blood Gluc Sensor (Dexcom G6 Sensor) As Needed (glucose control). Every 10 daysDexcom G6 Sensor Kit [address]-35 Use as directed for continuous glucose monitoring 9 each 3   • Continuous Blood Gluc Sensor (FreeStyle Marquis 2 Sensor) misc 1 each Every 14 (Fourteen) Days. 2 each 4   • famotidine (PEPCID) 40 MG tablet Take 40 mg by mouth Every Night.     • furosemide (LASIX) 20 MG tablet Take 10 mg by mouth Every Other Day.     • gabapentin (NEURONTIN) 100 MG capsule Take 100 mg by mouth Every Night.     • gabapentin (Neurontin) 300 MG capsule Take 1 capsule by mouth 3 (Three) Times a Day. 30 capsule 0   • Glucagon (Gvoke HypoPen 2-Pack) 1 MG/0.2ML solution auto-injector Inject 1 mg under the skin into the appropriate area as directed As Needed (for hypoglycemia). 0.4 mL 1   • Glucose Blood (Blood Glucose Test) strip Use 4 x daily use any brand covered by insurance or same brand as before ICD10 code is E11.9 120 each 11   • hydrALAZINE (APRESOLINE) 50 MG tablet Take 50 mg by mouth 2 (Two) Times a Day.     • Insulin Glargine, 2 Unit Dial, (Toujeo Max SoloStar) 300 UNIT/ML solution pen-injector injection Inject 30 Units under the skin into the appropriate area as directed every night at bedtime. 6 mL 11   • Insulin Lispro, 1 Unit Dial, (HUMALOG) 100 UNIT/ML solution pen-injector Inject 0-20 Units under the skin into the appropriate area as directed 3 (Three) Times a Day.     • Insulin Pen Needle (Advocate Insulin Pen Needles) 31G X 5 MM misc Use to inject insulin 4 times per day 200 each 11   • Insulin Pen Needle (B-D  "ULTRAFINE III SHORT PEN) 31G X 8 MM misc Use to inject insulin 4 times per day 120 each 11   • Lancets Misc. (Accu-Chek Multiclix Lancet Dev) kit Provide lancing device and 5 lancets per day use as needed 150 each 11   • lisinopril (PRINIVIL,ZESTRIL) 10 MG tablet Take 1 tablet by mouth Daily. 30 tablet 11   • oxyCODONE-acetaminophen (Percocet)  MG per tablet Take 1 tablet by mouth Every 4 (Four) Hours As Needed for Moderate Pain. 30 tablet 0   • sertraline (ZOLOFT) 100 MG tablet Take 100 mg by mouth 2 (Two) Times a Day.     • tiZANidine (ZANAFLEX) 4 MG tablet Take 4 mg by mouth At Night As Needed for Muscle Spasms.     • traMADol (ULTRAM) 50 MG tablet Take 50 mg by mouth Every 6 (Six) Hours As Needed for Moderate Pain .       No current facility-administered medications for this visit.       Review of Systems   Constitutional: Negative.    HENT: Negative.    Eyes: Negative.    Respiratory: Negative.    Cardiovascular: Negative.    Gastrointestinal: Negative.    Endocrine: Negative.    Genitourinary: Negative.    Musculoskeletal:        Foot pain  Ankle pain   Skin: Negative.    Allergic/Immunologic: Negative.    Neurological: Negative.    Hematological: Negative.    Psychiatric/Behavioral: Negative.          OBJECTIVE    Pulse 74   Ht 160 cm (63\")   Wt 78.9 kg (174 lb)   SpO2 98%   BMI 30.82 kg/m²     Physical Exam     Right lower extremity.  Incision sites approximated.  Moderate edema. Negative Homans.    Procedures        ASSESSMENT AND PLAN    Diagnoses and all orders for this visit:    1. Closed trimalleolar fracture of right ankle with routine healing, subsequent encounter (Primary)  -     XR Ankle 3+ View Right        -Patient doing well postoperatively.  Staples removed.  Applied Unna boot and nonweightbearing short leg fiberglass cast.  Recheck 1 week          This document has been electronically signed by Familia Infante DPM on May 31, 2022 15:55 CDT     5/31/2022  15:55 CDT    "

## 2022-06-01 ENCOUNTER — TELEPHONE (OUTPATIENT)
Dept: PODIATRY | Facility: CLINIC | Age: 52
End: 2022-06-01

## 2022-06-01 ENCOUNTER — OFFICE VISIT (OUTPATIENT)
Dept: PODIATRY | Facility: CLINIC | Age: 52
End: 2022-06-01

## 2022-06-01 VITALS — HEIGHT: 63 IN | WEIGHT: 174 LBS | OXYGEN SATURATION: 94 % | BODY MASS INDEX: 30.83 KG/M2 | HEART RATE: 74 BPM

## 2022-06-01 DIAGNOSIS — S82.851D CLOSED TRIMALLEOLAR FRACTURE OF RIGHT ANKLE WITH ROUTINE HEALING, SUBSEQUENT ENCOUNTER: Primary | ICD-10-CM

## 2022-06-01 PROCEDURE — 29405 APPL SHORT LEG CAST: CPT | Performed by: PODIATRIST

## 2022-06-01 PROCEDURE — 99024 POSTOP FOLLOW-UP VISIT: CPT | Performed by: PODIATRIST

## 2022-06-01 NOTE — TELEPHONE ENCOUNTER
PATIENT WAS SEEN YESTERDAY AND HAD HER STAPLES REMOVED, STATES HER FOOT HURT ALL  NIGHT AND SHE HAD TO TAKE HER PAIN MEDICINE WHICH SHE HAS NOT BEEN DOING.  ALSO LOOKS LIKE IT DID NOT CLOSE CORRECTLY IN A SPOT, SHE IS DIABETIC AND IS AFRAID IT IS INFECTED AND THINKS SHE NEEDS AN ANTIBIOTIC CALLED IN TO [facility].  SHE WOULD LIKE FOR THE NURSE TO CALL HER.

## 2022-06-01 NOTE — TELEPHONE ENCOUNTER
Called patient, she says her leg burns and stings, after talking to Dr. Infante he said she could come in today and we could look at it. She is coming in this afternoon.

## 2022-06-01 NOTE — PROGRESS NOTES
Amanda Padron  1970  51 y.o. female   PCP: 2022  BC:  60 per pt    2022      Chief Complaint   Patient presents with   • Right Foot - Follow-up       History of Present Illness    Amanda Padron is a 51 y.o.female presents to clinic today for her  post op appointment. She has an ankle ORIF on 22. Patient return to clinic for cast irritation.     Past Medical History:   Diagnosis Date   • Acute conjunctivitis    • Arthritis    • Brittle diabetes mellitus (HCC)     not controlled due to hypoglycemia      • CKD (chronic kidney disease)    • Diabetic retinopathy (HCC)    • Dyslipidemia    • GERD (gastroesophageal reflux disease)    • Hypertensive disorder    • Vitamin D deficiency          Past Surgical History:   Procedure Laterality Date   • ANKLE OPEN REDUCTION INTERNAL FIXATION Right 2022    Procedure: rightANKLE OPEN REDUCTION INTERNAL FIXATION;  Surgeon: Familia Infante DPM;  Location: [facility];  Service: Podiatry;  Laterality: Right;   •  SECTION      with tubal   • DILATATION AND CURETTAGE           Family History   Problem Relation Age of Onset   • Diabetes Sister    • Coronary artery disease Other    • Diabetes Other    • Hypertension Other        Allergies   Allergen Reactions   • Basaglar Kwikpen [Insulin Glargine] Other (See Comments)     Lantus   • Ramipril Arrhythmia and Dizziness   • Tresiba [Insulin Degludec] Other (See Comments)     Hypoglycemia         Social History     Socioeconomic History   • Marital status:    Tobacco Use   • Smoking status: Never Smoker   • Smokeless tobacco: Never Used   Vaping Use   • Vaping Use: Never used   Substance and Sexual Activity   • Alcohol use: Yes     Comment: occasionally   • Drug use: Not Currently   • Sexual activity: Defer         Current Outpatient Medications   Medication Sig Dispense Refill   • acetaminophen (TYLENOL) 650 MG 8 hr tablet Take 650 mg by mouth Every 8 (Eight) Hours As Needed for Mild Pain .      • apixaban (ELIQUIS) 2.5 MG tablet tablet Take 1 tablet by mouth Every 12 (Twelve) Hours. 60 tablet 0   • atorvastatin (LIPITOR) 40 MG tablet Take 60 mg by mouth Every Night.     • buPROPion (WELLBUTRIN) 100 MG tablet Take 100 mg by mouth 2 (Two) Times a Day.     • busPIRone (BUSPAR) 5 MG tablet Take 5 mg by mouth 2 (Two) Times a Day.     • cetirizine (zyrTEC) 10 MG tablet Take 10 mg by mouth Daily.     • Continuous Blood Gluc Sensor (Dexcom G6 Sensor) As Needed (glucose control). Every 10 daysDexcom G6 Sensor Kit [address]-15 Use as directed for continuous glucose monitoring 9 each 3   • Continuous Blood Gluc Sensor (FreeStyle Marquis 2 Sensor) misc 1 each Every 14 (Fourteen) Days. 2 each 4   • famotidine (PEPCID) 40 MG tablet Take 40 mg by mouth Every Night.     • furosemide (LASIX) 20 MG tablet Take 10 mg by mouth Every Other Day.     • gabapentin (NEURONTIN) 100 MG capsule Take 100 mg by mouth Every Night.     • gabapentin (Neurontin) 300 MG capsule Take 1 capsule by mouth 3 (Three) Times a Day. 30 capsule 0   • Glucagon (Gvoke HypoPen 2-Pack) 1 MG/0.2ML solution auto-injector Inject 1 mg under the skin into the appropriate area as directed As Needed (for hypoglycemia). 0.4 mL 1   • Glucose Blood (Blood Glucose Test) strip Use 4 x daily use any brand covered by insurance or same brand as before ICD10 code is E11.9 120 each 11   • hydrALAZINE (APRESOLINE) 50 MG tablet Take 50 mg by mouth 2 (Two) Times a Day.     • HYDROcodone-acetaminophen (Norco)  MG per tablet Take 1 tablet by mouth Every 8 (Eight) Hours As Needed for Moderate Pain . 21 tablet 0   • Insulin Glargine, 2 Unit Dial, (Toujeo Max SoloStar) 300 UNIT/ML solution pen-injector injection Inject 30 Units under the skin into the appropriate area as directed every night at bedtime. 6 mL 11   • Insulin Lispro, 1 Unit Dial, (HUMALOG) 100 UNIT/ML solution pen-injector Inject 0-20 Units under the skin into the appropriate area as directed 3 (Three) Times  "a Day.     • Insulin Pen Needle (Advocate Insulin Pen Needles) 31G X 5 MM misc Use to inject insulin 4 times per day 200 each 11   • Insulin Pen Needle (B-D ULTRAFINE III SHORT PEN) 31G X 8 MM misc Use to inject insulin 4 times per day 120 each 11   • Lancets Misc. (Accu-Chek Multiclix Lancet Dev) kit Provide lancing device and 5 lancets per day use as needed 150 each 11   • lisinopril (PRINIVIL,ZESTRIL) 10 MG tablet Take 1 tablet by mouth Daily. 30 tablet 11   • oxyCODONE-acetaminophen (Percocet)  MG per tablet Take 1 tablet by mouth Every 4 (Four) Hours As Needed for Moderate Pain. 30 tablet 0   • sertraline (ZOLOFT) 100 MG tablet Take 100 mg by mouth 2 (Two) Times a Day.     • tiZANidine (ZANAFLEX) 4 MG tablet Take 4 mg by mouth At Night As Needed for Muscle Spasms.     • traMADol (ULTRAM) 50 MG tablet Take 50 mg by mouth Every 6 (Six) Hours As Needed for Moderate Pain .       No current facility-administered medications for this visit.       Review of Systems   Constitutional: Negative.    Respiratory: Negative.    Cardiovascular: Negative.    Gastrointestinal: Negative.    Musculoskeletal:        Right ankle pain   Skin: Negative.    Psychiatric/Behavioral: Negative.          OBJECTIVE    Pulse 74   Ht 160 cm (63\")   Wt 78.9 kg (174 lb)   SpO2 94%   BMI 30.82 kg/m²     Physical Exam  Vitals reviewed.   Constitutional:       General: She is not in acute distress.     Appearance: She is well-developed.   HENT:      Head: Normocephalic and atraumatic.      Nose: Nose normal.   Eyes:      Conjunctiva/sclera: Conjunctivae normal.      Pupils: Pupils are equal, round, and reactive to light.   Pulmonary:      Effort: Pulmonary effort is normal. No respiratory distress.      Breath sounds: No wheezing.   Musculoskeletal:         General: No deformity. Normal range of motion.   Skin:     General: Skin is warm and dry.      Capillary Refill: Capillary refill takes less than 2 seconds.   Neurological:      " Mental Status: She is alert and oriented to person, place, and time.   Psychiatric:         Behavior: Behavior normal.         Thought Content: Thought content normal.          Right lower extremity.  Delayed incisional healing noted to central aspect of posterior lateral incision. No soi.  Mild edema. Negative Homans.    Procedures        ASSESSMENT AND PLAN    Diagnoses and all orders for this visit:    1. Closed trimalleolar fracture of right ankle with routine healing, subsequent encounter (Primary)        - cast changed. Roge 2 weeks          This document has been electronically signed by Familia Infante DPM on June 1, 2022 18:53 CDT     6/1/2022  18:53 CDT

## 2022-06-03 ENCOUNTER — READMISSION MANAGEMENT (OUTPATIENT)
Dept: CALL CENTER | Facility: HOSPITAL | Age: 52
End: 2022-06-03

## 2022-06-03 NOTE — OUTREACH NOTE
Medical Week 3 Survey    Flowsheet Row Responses   [facility] patient discharged from? [facility]   Does the patient have one of the following disease processes/diagnoses(primary or secondary)? Other   Week 3 attempt successful? Yes   Call start time 1313   Call end time 1317   Discharge diagnosis Displaced Maisonneuve fracture of left lower extremity, Closed trimalleolar fracture of right ankle, S/P Closed reduction of the right ankle   Meds reviewed with patient/caregiver? Yes   Is the patient taking all medications as directed (includes completed medication regime)? Yes   Does the patient have a primary care provider?  Yes   Has the patient kept scheduled appointments due by today? Yes   Psychosocial issues? No   Comments Had new cast on right foot and walking boot on left, non-wt bearing on right at all, still in wheelchair. Pt reports will likely be facing another surgery as her ankles are so small the MD is concerned about all hardware fitting.    What is the patient's perception of their health status since discharge? Improving   Is the patient/caregiver able to teach back signs and symptoms related to disease process for when to call PCP? Yes   Week 3 Call Completed? Yes          HORACE LOMELI - Registered Nurse

## 2022-06-13 ENCOUNTER — TELEPHONE (OUTPATIENT)
Dept: PODIATRY | Facility: CLINIC | Age: 52
End: 2022-06-13

## 2022-06-13 NOTE — TELEPHONE ENCOUNTER
Spoke with patient and let her know that Dr. Infante said she should be fine and doesn't need a refill on the Eliquist.

## 2022-06-13 NOTE — TELEPHONE ENCOUNTER
PT REQUESTS A CALL BACK RE WANTS TO KNOW IF ELIQUIST 2.5 MG THAT SHE COMPLETED NEEDS TO BE REFILLED.  IF IT IS PT WANTS IT SENT TO [facility] [place] [facility].  CALL BACK# [phone].  THANK YOU.

## 2022-06-15 ENCOUNTER — OFFICE VISIT (OUTPATIENT)
Dept: PODIATRY | Facility: CLINIC | Age: 52
End: 2022-06-15

## 2022-06-15 VITALS — BODY MASS INDEX: 30.83 KG/M2 | HEIGHT: 63 IN | OXYGEN SATURATION: 99 % | HEART RATE: 84 BPM | WEIGHT: 174 LBS

## 2022-06-15 DIAGNOSIS — S82.851D CLOSED TRIMALLEOLAR FRACTURE OF RIGHT ANKLE WITH ROUTINE HEALING, SUBSEQUENT ENCOUNTER: Primary | ICD-10-CM

## 2022-06-15 PROCEDURE — 99024 POSTOP FOLLOW-UP VISIT: CPT | Performed by: PODIATRIST

## 2022-06-15 NOTE — PROGRESS NOTES
Amanda Padron  1970  51 y.o. female   PCP: 2022  BC:  60 per pt    06/15/2022       Chief Complaint   Patient presents with   • Right Ankle - Follow-up, Pain       History of Present Illness    Amanda Padron is a 51 y.o.female presents to clinic today for her post op appointment. She has an ankle ORIF on 22.     Past Medical History:   Diagnosis Date   • Acute conjunctivitis    • Arthritis    • Brittle diabetes mellitus (HCC)     not controlled due to hypoglycemia      • CKD (chronic kidney disease)    • Diabetic retinopathy (HCC)    • Dyslipidemia    • GERD (gastroesophageal reflux disease)    • Hypertensive disorder    • Vitamin D deficiency          Past Surgical History:   Procedure Laterality Date   • ANKLE OPEN REDUCTION INTERNAL FIXATION Right 2022    Procedure: rightANKLE OPEN REDUCTION INTERNAL FIXATION;  Surgeon: Familia Infante DPM;  Location: [facility];  Service: Podiatry;  Laterality: Right;   •  SECTION      with tubal   • DILATATION AND CURETTAGE           Family History   Problem Relation Age of Onset   • Diabetes Sister    • Coronary artery disease Other    • Diabetes Other    • Hypertension Other        Allergies   Allergen Reactions   • Basaglar Kwikpen [Insulin Glargine] Other (See Comments)     Lantus   • Ramipril Arrhythmia and Dizziness   • Tresiba [Insulin Degludec] Other (See Comments)     Hypoglycemia         Social History     Socioeconomic History   • Marital status:    Tobacco Use   • Smoking status: Never Smoker   • Smokeless tobacco: Never Used   Vaping Use   • Vaping Use: Never used   Substance and Sexual Activity   • Alcohol use: Yes     Comment: occasionally   • Drug use: Not Currently   • Sexual activity: Defer         Current Outpatient Medications   Medication Sig Dispense Refill   • acetaminophen (TYLENOL) 650 MG 8 hr tablet Take 650 mg by mouth Every 8 (Eight) Hours As Needed for Mild Pain .     • apixaban (ELIQUIS) 2.5 MG tablet  tablet Take 1 tablet by mouth Every 12 (Twelve) Hours. 60 tablet 0   • atorvastatin (LIPITOR) 40 MG tablet Take 60 mg by mouth Every Night.     • buPROPion (WELLBUTRIN) 100 MG tablet Take 100 mg by mouth 2 (Two) Times a Day.     • busPIRone (BUSPAR) 5 MG tablet Take 5 mg by mouth 2 (Two) Times a Day.     • cetirizine (zyrTEC) 10 MG tablet Take 10 mg by mouth Daily.     • Continuous Blood Gluc Sensor (Dexcom G6 Sensor) As Needed (glucose control). Every 10 daysDexcom G6 Sensor Kit [address]-57 Use as directed for continuous glucose monitoring 9 each 3   • Continuous Blood Gluc Sensor (FreeStyle Marquis 2 Sensor) misc 1 each Every 14 (Fourteen) Days. 2 each 4   • famotidine (PEPCID) 40 MG tablet Take 40 mg by mouth Every Night.     • furosemide (LASIX) 20 MG tablet Take 10 mg by mouth Every Other Day.     • gabapentin (NEURONTIN) 100 MG capsule Take 100 mg by mouth Every Night.     • gabapentin (Neurontin) 300 MG capsule Take 1 capsule by mouth 3 (Three) Times a Day. 30 capsule 0   • Glucagon (Gvoke HypoPen 2-Pack) 1 MG/0.2ML solution auto-injector Inject 1 mg under the skin into the appropriate area as directed As Needed (for hypoglycemia). 0.4 mL 1   • Glucose Blood (Blood Glucose Test) strip Use 4 x daily use any brand covered by insurance or same brand as before ICD10 code is E11.9 120 each 11   • hydrALAZINE (APRESOLINE) 50 MG tablet Take 50 mg by mouth 2 (Two) Times a Day.     • HYDROcodone-acetaminophen (Norco)  MG per tablet Take 1 tablet by mouth Every 8 (Eight) Hours As Needed for Moderate Pain . 21 tablet 0   • Insulin Glargine, 2 Unit Dial, (Toujeo Max SoloStar) 300 UNIT/ML solution pen-injector injection Inject 30 Units under the skin into the appropriate area as directed every night at bedtime. 6 mL 11   • Insulin Lispro, 1 Unit Dial, (HUMALOG) 100 UNIT/ML solution pen-injector Inject 0-20 Units under the skin into the appropriate area as directed 3 (Three) Times a Day.     • Insulin Pen Needle  "(Advocate Insulin Pen Needles) 31G X 5 MM misc Use to inject insulin 4 times per day 200 each 11   • Insulin Pen Needle (B-D ULTRAFINE III SHORT PEN) 31G X 8 MM misc Use to inject insulin 4 times per day 120 each 11   • Lancets Misc. (Accu-Chek Multiclix Lancet Dev) kit Provide lancing device and 5 lancets per day use as needed 150 each 11   • lisinopril (PRINIVIL,ZESTRIL) 10 MG tablet Take 1 tablet by mouth Daily. 30 tablet 11   • oxyCODONE-acetaminophen (Percocet)  MG per tablet Take 1 tablet by mouth Every 4 (Four) Hours As Needed for Moderate Pain. 30 tablet 0   • sertraline (ZOLOFT) 100 MG tablet Take 100 mg by mouth 2 (Two) Times a Day.     • tiZANidine (ZANAFLEX) 4 MG tablet Take 4 mg by mouth At Night As Needed for Muscle Spasms.     • traMADol (ULTRAM) 50 MG tablet Take 50 mg by mouth Every 6 (Six) Hours As Needed for Moderate Pain .       No current facility-administered medications for this visit.       Review of Systems   Constitutional: Negative.    Respiratory: Negative.    Cardiovascular: Negative.    Gastrointestinal: Negative.    Musculoskeletal:        Right ankle pain   Skin: Negative.    Psychiatric/Behavioral: Negative.          OBJECTIVE    Pulse 84   Ht 160 cm (63\")   Wt 78.9 kg (174 lb)   SpO2 99%   BMI 30.82 kg/m²     Physical Exam  Vitals reviewed.   Constitutional:       General: She is not in acute distress.     Appearance: She is well-developed.   HENT:      Head: Normocephalic and atraumatic.      Nose: Nose normal.   Eyes:      Conjunctiva/sclera: Conjunctivae normal.      Pupils: Pupils are equal, round, and reactive to light.   Pulmonary:      Effort: Pulmonary effort is normal. No respiratory distress.      Breath sounds: No wheezing.   Musculoskeletal:         General: No deformity. Normal range of motion.   Skin:     General: Skin is warm and dry.      Capillary Refill: Capillary refill takes less than 2 seconds.   Neurological:      Mental Status: She is alert and " oriented to person, place, and time.   Psychiatric:         Behavior: Behavior normal.         Thought Content: Thought content normal.          Right lower extremity.  Incision site appears healed.  Mild edema. Negative Homans.  Ankle joint range of motion is decreased with discomfort.    Procedures        ASSESSMENT AND PLAN    Diagnoses and all orders for this visit:    1. Closed trimalleolar fracture of right ankle with routine healing, subsequent encounter (Primary)  -     Cancel: XR Ankle 3+ View Right        -Patient doing well postoperatively.  Transition out of cam boot on the left into athletic shoe gear.  Transition into cam boot on the right.  Partial weightbearing with crutches.  Recheck 2 weeks, repeat radiographs right ankle          This document has been electronically signed by Familia Infante DPM on June 17, 2022 06:55 CDT     6/17/2022  06:55 CDT

## 2022-06-16 DIAGNOSIS — E10.649 TYPE 1 DIABETES MELLITUS WITH HYPOGLYCEMIA UNAWARENESS: ICD-10-CM

## 2022-06-16 NOTE — TELEPHONE ENCOUNTER
Incoming Refill Request      Medication requested (name and dose):   CONTINOUS BLOOD GLUC SENSOR (FREESTYLE LENNY 2 SENSOR)    Pharmacy where request should be sent: [facility]    Additional details provided by patient: NONE    Best call back number: [phone]    Does the patient have less than a 3 day supply:  [x] Yes  [] No    Shantal Green  06/16/22, 16:51 CDT

## 2022-06-20 ENCOUNTER — DOCUMENTATION (OUTPATIENT)
Dept: ENDOCRINOLOGY | Facility: CLINIC | Age: 52
End: 2022-06-20

## 2022-06-21 ENCOUNTER — SPECIALTY PHARMACY (OUTPATIENT)
Dept: ENDOCRINOLOGY | Facility: CLINIC | Age: 52
End: 2022-06-21

## 2022-06-29 ENCOUNTER — OFFICE VISIT (OUTPATIENT)
Dept: PODIATRY | Facility: CLINIC | Age: 52
End: 2022-06-29

## 2022-06-29 VITALS — BODY MASS INDEX: 30.83 KG/M2 | WEIGHT: 174 LBS | OXYGEN SATURATION: 98 % | HEIGHT: 63 IN | HEART RATE: 80 BPM

## 2022-06-29 DIAGNOSIS — S82.851D CLOSED TRIMALLEOLAR FRACTURE OF RIGHT ANKLE WITH ROUTINE HEALING, SUBSEQUENT ENCOUNTER: Primary | ICD-10-CM

## 2022-06-29 PROCEDURE — 99024 POSTOP FOLLOW-UP VISIT: CPT | Performed by: PODIATRIST

## 2022-06-29 NOTE — PROGRESS NOTES
Amanda Padron  1970  51 y.o. female   PCP: 2022  BC:  78 per pt    2022       Chief Complaint   Patient presents with   • Right Ankle - Follow-up       History of Present Illness    Amanda Padron is a 51 y.o.female presents to clinic today for her post op appointment. She has an ankle ORIF on 22.     Past Medical History:   Diagnosis Date   • Acute conjunctivitis    • Arthritis    • Brittle diabetes mellitus (HCC)     not controlled due to hypoglycemia      • CKD (chronic kidney disease)    • Diabetic retinopathy (HCC)    • Dyslipidemia    • GERD (gastroesophageal reflux disease)    • Hypertensive disorder    • Vitamin D deficiency          Past Surgical History:   Procedure Laterality Date   • ANKLE OPEN REDUCTION INTERNAL FIXATION Right 2022    Procedure: rightANKLE OPEN REDUCTION INTERNAL FIXATION;  Surgeon: Familia Infante DPM;  Location: [facility];  Service: Podiatry;  Laterality: Right;   •  SECTION      with tubal   • DILATATION AND CURETTAGE           Family History   Problem Relation Age of Onset   • Diabetes Sister    • Coronary artery disease Other    • Diabetes Other    • Hypertension Other        Allergies   Allergen Reactions   • Basaglar Kwikpen [Insulin Glargine] Other (See Comments)     Lantus   • Ramipril Arrhythmia and Dizziness   • Tresiba [Insulin Degludec] Other (See Comments)     Hypoglycemia         Social History     Socioeconomic History   • Marital status:    Tobacco Use   • Smoking status: Never Smoker   • Smokeless tobacco: Never Used   Vaping Use   • Vaping Use: Never used   Substance and Sexual Activity   • Alcohol use: Yes     Comment: occasionally   • Drug use: Not Currently   • Sexual activity: Defer         Current Outpatient Medications   Medication Sig Dispense Refill   • acetaminophen (TYLENOL) 650 MG 8 hr tablet Take 650 mg by mouth Every 8 (Eight) Hours As Needed for Mild Pain .     • apixaban (ELIQUIS) 2.5 MG tablet tablet  Take 1 tablet by mouth Every 12 (Twelve) Hours. 60 tablet 0   • atorvastatin (LIPITOR) 40 MG tablet Take 60 mg by mouth Every Night.     • buPROPion (WELLBUTRIN) 100 MG tablet Take 100 mg by mouth 2 (Two) Times a Day.     • busPIRone (BUSPAR) 5 MG tablet Take 5 mg by mouth 2 (Two) Times a Day.     • cetirizine (zyrTEC) 10 MG tablet Take 10 mg by mouth Daily.     • Continuous Blood Gluc Sensor (Dexcom G6 Sensor) As Needed (glucose control). Every 10 daysDexcom G6 Sensor Kit [address]-58 Use as directed for continuous glucose monitoring 9 each 3   • Continuous Blood Gluc Sensor (FreeStyle Marquis 2 Sensor) misc 1 each Every 14 (Fourteen) Days. 2 each 4   • famotidine (PEPCID) 40 MG tablet Take 40 mg by mouth Every Night.     • furosemide (LASIX) 20 MG tablet Take 10 mg by mouth Every Other Day.     • gabapentin (Neurontin) 300 MG capsule Take 1 capsule by mouth 3 (Three) Times a Day. 30 capsule 0   • Glucagon (Gvoke HypoPen 2-Pack) 1 MG/0.2ML solution auto-injector Inject 1 mg under the skin into the appropriate area as directed As Needed (for hypoglycemia). 0.4 mL 1   • Glucose Blood (Blood Glucose Test) strip Use 4 x daily use any brand covered by insurance or same brand as before ICD10 code is E11.9 120 each 11   • hydrALAZINE (APRESOLINE) 50 MG tablet Take 50 mg by mouth 2 (Two) Times a Day.     • HYDROcodone-acetaminophen (Norco)  MG per tablet Take 1 tablet by mouth Every 8 (Eight) Hours As Needed for Moderate Pain . 21 tablet 0   • Insulin Glargine, 2 Unit Dial, (Toujeo Max SoloStar) 300 UNIT/ML solution pen-injector injection Inject 30 Units under the skin into the appropriate area as directed every night at bedtime. 6 mL 11   • Insulin Lispro, 1 Unit Dial, (HUMALOG) 100 UNIT/ML solution pen-injector Inject 0-20 Units under the skin into the appropriate area as directed 3 (Three) Times a Day.     • Insulin Pen Needle (Advocate Insulin Pen Needles) 31G X 5 MM misc Use to inject insulin 4 times per day 200  "each 11   • Insulin Pen Needle (B-D ULTRAFINE III SHORT PEN) 31G X 8 MM misc Use to inject insulin 4 times per day 120 each 11   • Lancets Misc. (Accu-Chek Multiclix Lancet Dev) kit Provide lancing device and 5 lancets per day use as needed 150 each 11   • lisinopril (PRINIVIL,ZESTRIL) 10 MG tablet Take 1 tablet by mouth Daily. 30 tablet 11   • oxyCODONE-acetaminophen (Percocet)  MG per tablet Take 1 tablet by mouth Every 4 (Four) Hours As Needed for Moderate Pain. 30 tablet 0   • sertraline (ZOLOFT) 100 MG tablet Take 100 mg by mouth 2 (Two) Times a Day.     • tiZANidine (ZANAFLEX) 4 MG tablet Take 4 mg by mouth At Night As Needed for Muscle Spasms.     • traMADol (ULTRAM) 50 MG tablet Take 50 mg by mouth Every 6 (Six) Hours As Needed for Moderate Pain .     • gabapentin (NEURONTIN) 100 MG capsule Take 100 mg by mouth Every Night.       No current facility-administered medications for this visit.       Review of Systems   Constitutional: Negative.    Respiratory: Negative.    Cardiovascular: Negative.    Gastrointestinal: Negative.    Musculoskeletal:        Right ankle pain   Skin: Negative.    Psychiatric/Behavioral: Negative.          OBJECTIVE    Pulse 80   Ht 160 cm (63\")   Wt 78.9 kg (174 lb)   SpO2 98%   BMI 30.82 kg/m²     Physical Exam  Vitals reviewed.   Constitutional:       General: She is not in acute distress.     Appearance: She is well-developed.   HENT:      Head: Normocephalic and atraumatic.      Nose: Nose normal.   Eyes:      Conjunctiva/sclera: Conjunctivae normal.      Pupils: Pupils are equal, round, and reactive to light.   Pulmonary:      Effort: Pulmonary effort is normal. No respiratory distress.      Breath sounds: No wheezing.   Musculoskeletal:         General: No deformity. Normal range of motion.   Skin:     General: Skin is warm and dry.      Capillary Refill: Capillary refill takes less than 2 seconds.   Neurological:      Mental Status: She is alert and oriented to " person, place, and time.   Psychiatric:         Behavior: Behavior normal.         Thought Content: Thought content normal.          Right lower extremity.  Incision site with central area of delayed healing.  No signs of infection.  Mild edema. Negative Homans.  Ankle joint range of motion is decreased without pain.    Procedures        ASSESSMENT AND PLAN    Diagnoses and all orders for this visit:    1. Closed trimalleolar fracture of right ankle with routine healing, subsequent encounter (Primary)  -     XR Ankle 3+ View Right        -Patient doing well postoperatively.  Full weightbearing in cam boot on the right.  Recheck 2 weeks          This document has been electronically signed by Familia Infante DPM on June 29, 2022 12:34 CDT     6/29/2022  12:34 CDT

## 2022-07-18 ENCOUNTER — TELEPHONE (OUTPATIENT)
Dept: ENDOCRINOLOGY | Facility: CLINIC | Age: 52
End: 2022-07-18

## 2022-07-18 NOTE — TELEPHONE ENCOUNTER
PT called and is needing samples of the following medication(s): Insulin Glargine, 2 Unit Dial, (Toujeo Max SoloStar) 300 UNIT/ML solution pen-injector injection

## 2022-07-20 ENCOUNTER — OFFICE VISIT (OUTPATIENT)
Dept: PODIATRY | Facility: CLINIC | Age: 52
End: 2022-07-20

## 2022-07-20 VITALS — WEIGHT: 174 LBS | HEIGHT: 63 IN | HEART RATE: 64 BPM | OXYGEN SATURATION: 98 % | BODY MASS INDEX: 30.83 KG/M2

## 2022-07-20 DIAGNOSIS — S82.851D CLOSED TRIMALLEOLAR FRACTURE OF RIGHT ANKLE WITH ROUTINE HEALING, SUBSEQUENT ENCOUNTER: Primary | ICD-10-CM

## 2022-07-20 PROCEDURE — 99024 POSTOP FOLLOW-UP VISIT: CPT | Performed by: PODIATRIST

## 2022-07-20 NOTE — PROGRESS NOTES
Amanda Padron  1970  51 y.o. female   PCP: 2022  BC:  97 per pt    2022       Chief Complaint   Patient presents with   • Right Ankle - Follow-up       History of Present Illness    Amanda Padron is a 51 y.o.female presents to clinic today for her post op appointment. She has an ankle ORIF on 22.      Past Medical History:   Diagnosis Date   • Acute conjunctivitis    • Arthritis    • Brittle diabetes mellitus (HCC)     not controlled due to hypoglycemia      • CKD (chronic kidney disease)    • Diabetic retinopathy (HCC)    • Dyslipidemia    • GERD (gastroesophageal reflux disease)    • Hypertensive disorder    • Vitamin D deficiency          Past Surgical History:   Procedure Laterality Date   • ANKLE OPEN REDUCTION INTERNAL FIXATION Right 2022    Procedure: rightANKLE OPEN REDUCTION INTERNAL FIXATION;  Surgeon: Familia Infante DPM;  Location: [facility];  Service: Podiatry;  Laterality: Right;   •  SECTION      with tubal   • DILATATION AND CURETTAGE           Family History   Problem Relation Age of Onset   • Diabetes Sister    • Coronary artery disease Other    • Diabetes Other    • Hypertension Other        Allergies   Allergen Reactions   • Basaglar Kwikpen [Insulin Glargine] Other (See Comments)     Lantus   • Ramipril Arrhythmia and Dizziness   • Tresiba [Insulin Degludec] Other (See Comments)     Hypoglycemia         Social History     Socioeconomic History   • Marital status:    Tobacco Use   • Smoking status: Never Smoker   • Smokeless tobacco: Never Used   Vaping Use   • Vaping Use: Never used   Substance and Sexual Activity   • Alcohol use: Yes     Comment: occasionally   • Drug use: Not Currently   • Sexual activity: Defer         Current Outpatient Medications   Medication Sig Dispense Refill   • acetaminophen (TYLENOL) 650 MG 8 hr tablet Take 650 mg by mouth Every 8 (Eight) Hours As Needed for Mild Pain .     • apixaban (ELIQUIS) 2.5 MG tablet tablet  Take 1 tablet by mouth Every 12 (Twelve) Hours. 60 tablet 0   • atorvastatin (LIPITOR) 40 MG tablet Take 60 mg by mouth Every Night.     • buPROPion (WELLBUTRIN) 100 MG tablet Take 100 mg by mouth 2 (Two) Times a Day.     • busPIRone (BUSPAR) 5 MG tablet Take 5 mg by mouth 2 (Two) Times a Day.     • cetirizine (zyrTEC) 10 MG tablet Take 10 mg by mouth Daily.     • Continuous Blood Gluc Sensor (Dexcom G6 Sensor) As Needed (glucose control). Every 10 daysDexcom G6 Sensor Kit [address]-09 Use as directed for continuous glucose monitoring 9 each 3   • Continuous Blood Gluc Sensor (FreeStyle Marquis 2 Sensor) misc 1 each Every 14 (Fourteen) Days. 2 each 4   • famotidine (PEPCID) 40 MG tablet Take 40 mg by mouth Every Night.     • furosemide (LASIX) 20 MG tablet Take 10 mg by mouth Every Other Day.     • gabapentin (NEURONTIN) 100 MG capsule Take 100 mg by mouth Every Night.     • gabapentin (Neurontin) 300 MG capsule Take 1 capsule by mouth 3 (Three) Times a Day. 30 capsule 0   • Glucagon (Gvoke HypoPen 2-Pack) 1 MG/0.2ML solution auto-injector Inject 1 mg under the skin into the appropriate area as directed As Needed (for hypoglycemia). 0.4 mL 1   • Glucose Blood (Blood Glucose Test) strip Use 4 x daily use any brand covered by insurance or same brand as before ICD10 code is E11.9 120 each 11   • hydrALAZINE (APRESOLINE) 50 MG tablet Take 50 mg by mouth 2 (Two) Times a Day.     • HYDROcodone-acetaminophen (Norco)  MG per tablet Take 1 tablet by mouth Every 8 (Eight) Hours As Needed for Moderate Pain . 21 tablet 0   • Insulin Glargine, 2 Unit Dial, (Toujeo Max SoloStar) 300 UNIT/ML solution pen-injector injection Inject 30 Units under the skin into the appropriate area as directed every night at bedtime. 6 mL 11   • Insulin Lispro, 1 Unit Dial, (HUMALOG) 100 UNIT/ML solution pen-injector Inject 0-20 Units under the skin into the appropriate area as directed 3 (Three) Times a Day.     • Insulin Pen Needle (Advocate  "Insulin Pen Needles) 31G X 5 MM misc Use to inject insulin 4 times per day 200 each 11   • Insulin Pen Needle (B-D ULTRAFINE III SHORT PEN) 31G X 8 MM misc Use to inject insulin 4 times per day 120 each 11   • Lancets Misc. (Accu-Chek Multiclix Lancet Dev) kit Provide lancing device and 5 lancets per day use as needed 150 each 11   • lisinopril (PRINIVIL,ZESTRIL) 10 MG tablet Take 1 tablet by mouth Daily. 30 tablet 11   • oxyCODONE-acetaminophen (Percocet)  MG per tablet Take 1 tablet by mouth Every 4 (Four) Hours As Needed for Moderate Pain. 30 tablet 0   • sertraline (ZOLOFT) 100 MG tablet Take 100 mg by mouth 2 (Two) Times a Day.     • tiZANidine (ZANAFLEX) 4 MG tablet Take 4 mg by mouth At Night As Needed for Muscle Spasms.     • traMADol (ULTRAM) 50 MG tablet Take 50 mg by mouth Every 6 (Six) Hours As Needed for Moderate Pain .       No current facility-administered medications for this visit.       Review of Systems   Constitutional: Negative.    Respiratory: Negative.    Cardiovascular: Negative.    Gastrointestinal: Negative.    Musculoskeletal:        Right ankle pain   Skin: Negative.    Psychiatric/Behavioral: Negative.          OBJECTIVE    Pulse 64   Ht 160 cm (63\")   Wt 78.9 kg (174 lb)   SpO2 98%   BMI 30.82 kg/m²     Physical Exam  Vitals reviewed.   Constitutional:       General: She is not in acute distress.     Appearance: She is well-developed.   HENT:      Head: Normocephalic and atraumatic.      Nose: Nose normal.   Eyes:      Conjunctiva/sclera: Conjunctivae normal.      Pupils: Pupils are equal, round, and reactive to light.   Pulmonary:      Effort: Pulmonary effort is normal. No respiratory distress.      Breath sounds: No wheezing.   Musculoskeletal:         General: No deformity. Normal range of motion.   Skin:     General: Skin is warm and dry.      Capillary Refill: Capillary refill takes less than 2 seconds.   Neurological:      Mental Status: She is alert and oriented to " person, place, and time.   Psychiatric:         Behavior: Behavior normal.         Thought Content: Thought content normal.          Right lower extremity.  Incision site with central area of delayed healing.  No signs of infection.  Moderate edema. Negative Homans.  Ankle joint range of motion is decreased without pain.    Procedures        ASSESSMENT AND PLAN    Diagnoses and all orders for this visit:    1. Closed trimalleolar fracture of right ankle with routine healing, subsequent encounter (Primary)  -     Ambulatory Referral to Physical Therapy POST OP, Aquatics, Evaluate and treat, Ortho; Strengthening, Stretching, ROM; Full weight bearing        -Patient doing well postoperatively.  Transition to regular shoe.  Encouraged use of compression stocking regularly.  Referral to physical therapy.  Recheck 2 to 3 weeks          This document has been electronically signed by Familia Infante DPM on July 20, 2022 09:36 CDT     7/20/2022  09:36 CDT

## 2022-07-21 ENCOUNTER — APPOINTMENT (OUTPATIENT)
Dept: PHYSICAL THERAPY | Facility: HOSPITAL | Age: 52
End: 2022-07-21

## 2022-07-26 ENCOUNTER — HOSPITAL ENCOUNTER (OUTPATIENT)
Dept: PHYSICAL THERAPY | Facility: HOSPITAL | Age: 52
Setting detail: THERAPIES SERIES
Discharge: HOME | End: 2022-07-26

## 2022-07-26 DIAGNOSIS — S82.851D CLOSED TRIMALLEOLAR FRACTURE OF RIGHT ANKLE WITH ROUTINE HEALING, SUBSEQUENT ENCOUNTER: Primary | ICD-10-CM

## 2022-07-26 PROCEDURE — 97162 PT EVAL MOD COMPLEX 30 MIN: CPT | Performed by: PHYSICAL THERAPIST

## 2022-07-26 NOTE — THERAPY EVALUATION
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)   • Closed trimalleolar fracture of right ankle   • Displaced Maisonneuve fracture of left lower extremity   • Syndesmotic disruption of left ankle        Past Medical History:   Diagnosis Date   • Acute conjunctivitis    • Arthritis    • Brittle diabetes mellitus ([facility])     not controlled due to hypoglycemia      • CKD (chronic kidney disease)    • Diabetic retinopathy ([facility])    • Dyslipidemia    • GERD (gastroesophageal reflux disease)    • Hypertensive disorder    • Vitamin D deficiency         Past Surgical History:   Procedure Laterality Date   • ANKLE OPEN REDUCTION INTERNAL FIXATION Right 2022    Procedure: rightANKLE OPEN REDUCTION INTERNAL FIXATION;  Surgeon: Familia Infante DPM;  Location: [facility];  Service: Podiatry;  Laterality: Right;   •  SECTION      with tubal   • DILATATION AND CURETTAGE         Visit Dx:     ICD-10-CM ICD-9-CM   1. Closed trimalleolar fracture of right ankle with routine healing, subsequent encounter  S82.851D V54.19          Patient History     Row Name 22 1015             History    Type of Pain Ankle pain  -BB      Date Current Problem(s) Began 22  sx 22  -BB      Brief Description of Current Complaint Per patient blood sugar dropped and led to her falling. Reports couldnt get up and father in law helped. Was taken to ED. Reports  breaking a nonwt bearing bone in left knee and multiple fx on right ankle. Reports having blood sugar issues. Notes boot off last week and then on saturday may of over done tasks that led to ankle rolling in some and had high pain that lasted a while and since has had bruising.  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  -BB              Pain     Pain Location Ankle  -BB      Pain at Present 3  -BB      Pain at Worst 8  on saturday  -BB              Fall Risk Assessment    Any falls in the past year: Yes  -BB      Other factors that contributed to the fall: blood sugar drops  -BB            User Key  (r) = Recorded By, (t) = Taken By, (c) = Cosigned By    Initials Name Provider Type    BB Funmi Burdick PT DPT Physical Therapist                 PT Ortho     Row Name 07/26/22 1015       Subjective Comments    Subjective Comments see pt hx  -BB       Precautions and Contraindications    Precautions uncontrolled type 1  -BB    Contraindications HTN- monitor BP and blood sugar  -BB       Subjective Pain    Able to rate subjective pain? yes  -BB    Pre-Treatment Pain Level 3  -BB       Posture/Observations    Posture/Observations Comments bandage present on lateral malleolus. Street shoe with ER and abducted leg with limited ankle motion  -BB       General ROM    RT Lower Ext Rt Ankle Dorsiflexion;Rt Ankle Plantarflexion;Rt Ankle Inversion;Rt Ankle Eversion  -BB       Right Lower Ext    Rt Ankle Dorsiflexion AROM neutral  -BB    Rt Ankle Plantarflexion AROM 25  -BB    Rt Ankle Inversion AROM 5  -BB    Rt Ankle Eversion AROM neutral  -BB       MMT (Manual Muscle Testing)    General MMT Comments deferred  -BB       Sensation    Additional  "Comments hypersensitive of plantar foot, sensation reported felt of dorsal foot but deminished  -BB       Girth    Girth Measured? Right Lower Extremity  -BB       RLE Quick Girth (cm)    Mid foot 22 cm  -BB    Smallest ankle 20.5 cm  -BB    Other 1 49.3 cm  figure 8  -BB    Other 2 25.5 cm  malleoli  -BB    RLE Quick Girth Total 117.3  -BB       Transfers    Comment, (Transfers) independent in all  -BB       Gait/Stairs (Locomotion)    Comment, (Gait/Stairs) antalgics with ER and abducted foot. encouraged use of crutch  -BB          User Key  (r) = Recorded By, (t) = Taken By, (c) = Cosigned By    Initials Name Provider Type    Funmi Skinner, PT DPT Physical Therapist                            Therapy Education  Education Details: tubigrip e, ROM, ABCs      PT OP Goals     Row Name 07/26/22 1015          PT Short Term Goals    STG Date to Achieve 08/18/22  -BB     STG 1 DF 5 degrees AROM CKC  -BB     STG 2 Ambulate heel to toe pattern with or without AD  -BB            Long Term Goals    LTG Date to Achieve 09/08/22  -BB     LTG 1 DF AROM 10 degrees or better  -BB     LTG 2 ambulate without AD without antalgics-heel to toe pattern  -BB     LTG 3 ankle strength ev/inv 4+/5 or better  -BB     LTG 4 SLS 3\" level ground safely  -BB            Time Calculation    PT Goal Re-Cert Due Date 08/16/22  -BB           User Key  (r) = Recorded By, (t) = Taken By, (c) = Cosigned By    Initials Name Provider Type    Funmi Skinner, PT DPT Physical Therapist                 PT Assessment/Plan     Row Name 07/26/22 1015          PT Assessment    Functional Limitations Impaired gait;Limitations in functional capacity and performance;Performance in leisure activities;Limitation in home management;Performance in self-care ADL;Limitations in community activities;Impaired locomotion  -BB     Impairments Balance;Gait;Edema;Pain;Range of motion;Poor body mechanics;Sensation;Muscle strength;Joint mobility  -BB     Assessment " Comments Patient presents today with ORIF ankle due to fall related to blood sugar. Noted to have decreased AROM, decreased strength and balance limiting gait. Patient could benefit from PT to improve functional mobility and gait to return to PLOF.  -BB     Rehab Potential Good  -BB     Patient/caregiver participated in establishment of treatment plan and goals Yes  -BB     Patient would benefit from skilled therapy intervention Yes  -BB            PT Plan    PT Frequency 2x/week;3x/week  -BB     Predicted Duration of Therapy Intervention (PT) 9 weeks  -BB     Planned CPT's? PT EVAL MOD COMPLELITY: 60297;PT RE-EVAL: 99876;PT THER PROC EA 15 MIN: 62159;PT THER ACT EA 15 MIN: 06936;PT MANUAL THERAPY EA 15 MIN: 06478;PT GAIT TRAINING EA 15 MIN: 85254;PT THER SUPP EA 15 MIN;PT INITIAL ORTHOTIC MGMT/TRAIN EA 15 MIN: 35883;PT SELF CARE/HOME MGMT/TRAIN EA 15: 72316;PT AQUATIC THERAPY EA 15 MIN: 86373  -BB     PT Plan Comments ROM, aquatics once wounds closed, strength, gait, manual PRN, ice/heat only, balance  -BB           User Key  (r) = Recorded By, (t) = Taken By, (c) = Cosigned By    Initials Name Provider Type    Funmi Skinner, PT DPT Physical Therapist                   OP Exercises     Row Name 07/26/22 1015             Subjective Comments    Subjective Comments see pt hx  -BB              Subjective Pain    Able to rate subjective pain? yes  -BB      Pre-Treatment Pain Level 3  -BB      Post-Treatment Pain Level 3  -BB              Exercise 1    Exercise Name 1 abcs, HR/TR, tubigrip E, encouraged crutch use at least one for gait  -BB            User Key  (r) = Recorded By, (t) = Taken By, (c) = Cosigned By    Initials Name Provider Type    Funmi Skinner, PT DPT Physical Therapist                                        Time Calculation:     Start Time: 1015  Stop Time: 1100  Time Calculation (min): 45 min     Therapy Charges for Today     Code Description Service Date Service Provider Modifiers Qty     [id number]  PT EVAL MOD COMPLEXITY 3 7/26/2022 Funmi Burdick, PT DPT GP 1                    Funmi Burdick, PT MAYRAT  7/27/2022

## 2022-08-02 ENCOUNTER — HOSPITAL ENCOUNTER (OUTPATIENT)
Dept: PHYSICAL THERAPY | Facility: HOSPITAL | Age: 52
Setting detail: THERAPIES SERIES
Discharge: HOME | End: 2022-08-02

## 2022-08-02 DIAGNOSIS — S82.851D CLOSED TRIMALLEOLAR FRACTURE OF RIGHT ANKLE WITH ROUTINE HEALING, SUBSEQUENT ENCOUNTER: Primary | ICD-10-CM

## 2022-08-02 PROCEDURE — 97110 THERAPEUTIC EXERCISES: CPT | Performed by: PHYSICAL THERAPIST

## 2022-08-02 PROCEDURE — 97140 MANUAL THERAPY 1/> REGIONS: CPT | Performed by: PHYSICAL THERAPIST

## 2022-08-03 NOTE — THERAPY TREATMENT NOTE
Outpatient Physical Therapy Ortho Treatment Note  [facility]     Patient Name: Amanda Padron  : 1970  MRN: [id number]  Today's Date: 8/3/2022      Visit Date: 2022    Visit Dx:    ICD-10-CM ICD-9-CM   1. 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 with type 1 diabetes mellitus (HCC)   • Type 1 diabetes mellitus with hypoglycemia unawareness (HCC)   • Closed trimalleolar fracture of right ankle   • Displaced Maisonneuve fracture of left lower extremity   • Syndesmotic disruption of left ankle        Past Medical History:   Diagnosis Date   • Acute conjunctivitis    • Arthritis    • Brittle diabetes mellitus (HCC)     not controlled due to hypoglycemia      • CKD (chronic kidney disease)    • Diabetic retinopathy (HCC)    • Dyslipidemia    • GERD (gastroesophageal reflux disease)    • Hypertensive disorder    • Vitamin D deficiency         Past Surgical History:   Procedure Laterality Date   • ANKLE OPEN REDUCTION INTERNAL FIXATION Right 2022    Procedure: rightANKLE OPEN REDUCTION INTERNAL FIXATION;  Surgeon: Familia Infante DPM;  Location: [facility];  Service: Podiatry;  Laterality: Right;   •  SECTION      with tubal   • DILATATION AND CURETTAGE          PT Ortho     Row Name 22 1015       Subjective Comments    Subjective Comments Notes burning for a while after doing ROM and stretching,  -BB       Precautions and Contraindications    Precautions uncontrolled type 1  -BB    Contraindications HTN- monitor BP and blood sugar  -BB       Subjective Pain    Able to rate subjective pain?  yes  -BB    Pre-Treatment Pain Level 3  -BB    Post-Treatment Pain Level 3  -BB       Vital Signs    Vitals Comment 140/60 BP Took insulin upon arrival  -BB       Posture/Observations    Posture/Observations Comments Bandage present on ankle. Taut heel cord.  -BB       Right Lower Ext    Rt Ankle Dorsiflexion AROM about 2 degrees from CKC after treatment. prior to tx at neutral  -BB       Sensation    Additional Comments hypersensitive to generalized heel cord area and posterior department of leg  -BB          User Key  (r) = Recorded By, (t) = Taken By, (c) = Cosigned By    Initials Name Provider Type    Funmi Skinner, PT DPT Physical Therapist                             PT Assessment/Plan     Row Name 08/02/22 1015          PT Assessment    Assessment Comments Improving AROM DF from CKC. Notes hypersensitivity of posterior compartment of calf.  -BB            PT Plan    PT Frequency 2x/week;3x/week  -BB     Predicted Duration of Therapy Intervention (PT) 9 weeks  -BB     PT Plan Comments continue gait, strength and ROM  -BB           User Key  (r) = Recorded By, (t) = Taken By, (c) = Cosigned By    Initials Name Provider Type    Funmi Skinner, PT DPT Physical Therapist                   OP Exercises     Row Name 08/02/22 1015             Subjective Comments    Subjective Comments Notes burning for a while after doing ROM and stretching,  -BB              Subjective Pain    Able to rate subjective pain? yes  -BB      Pre-Treatment Pain Level 3  -BB      Post-Treatment Pain Level 3  -BB              Exercise 1    Exercise Name 1 PROM of ankle all planes  -BB      Time 1 5'  -BB              Exercise 2    Exercise Name 2 wobble board all directions  -BB      Reps 2 20x  -BB              Exercise 3    Exercise Name 3 time away from patient  -BB      Time 3 8'  -BB              Exercise 4    Exercise Name 4 MFR to ankle ankle for skin mobility, MFR/cross friction and pin and glides to gastroc complex  -BB       "Time 4 20'  -BB              Exercise 5    Exercise Name 5 PROM ankle all planes  -BB      Reps 5 about 10 each way  -BB            User Key  (r) = Recorded By, (t) = Taken By, (c) = Cosigned By    Initials Name Provider Type    Funmi Skinner PT DPT Physical Therapist                              PT OP Goals     Row Name 08/02/22 1015          PT Short Term Goals    STG Date to Achieve 08/18/22  -BB     STG 1 DF 5 degrees AROM CKC  -BB     STG 1 Progress Progressing  2  -BB     STG 2 Ambulate heel to toe pattern with or without AD  -BB     STG 2 Progress Not Met  -BB            Long Term Goals    LTG Date to Achieve 09/08/22  -BB     LTG 1 DF AROM 10 degrees or better  -BB     LTG 2 ambulate without AD without antalgics-heel to toe pattern  -BB     LTG 3 ankle strength ev/inv 4+/5 or better  -BB     LTG 4 SLS 3\" level ground safely  -BB            Time Calculation    PT Goal Re-Cert Due Date 08/16/22  -BB           User Key  (r) = Recorded By, (t) = Taken By, (c) = Cosigned By    Initials Name Provider Type    Funmi Skinner, PT DPT Physical Therapist                               Time Calculation:   Start Time: 1015  Stop Time: 1105  Time Calculation (min): 50 min  PT Non-Billable Time (min): 8 min  Therapy Charges for Today     Code Description Service Date Service Provider Modifiers Qty    95785722999 HC PT MANUAL THERAPY EA 15 MIN 8/2/2022 Funmi Burdick PT DPT GP 1    88417433162 HC PT THER PROC EA 15 MIN 8/2/2022 Funmi Burdick, PT DPT GP 1    96093610499 HC PT THER SUPP EA 15 MIN 8/2/2022 Funmi Burdick, PT DPT GP 1                    Funmi Burdick PT DPT  8/3/2022     "

## 2022-08-04 ENCOUNTER — HOSPITAL ENCOUNTER (OUTPATIENT)
Dept: PHYSICAL THERAPY | Facility: HOSPITAL | Age: 52
Setting detail: THERAPIES SERIES
Discharge: HOME | End: 2022-08-04

## 2022-08-04 DIAGNOSIS — S82.851D CLOSED TRIMALLEOLAR FRACTURE OF RIGHT ANKLE WITH ROUTINE HEALING, SUBSEQUENT ENCOUNTER: Primary | ICD-10-CM

## 2022-08-04 PROCEDURE — 97110 THERAPEUTIC EXERCISES: CPT

## 2022-08-04 NOTE — THERAPY TREATMENT NOTE
Outpatient Physical Therapy Ortho Treatment Note  [facility]     Patient Name: Amadna Padron  : 1970  MRN: [id number]  Today's Date: 2022      Visit Date: 2022   Attendance: 3/3  Subjective improvement: n/a  Recert: 22  MD Appointment: 8/10/22      Visit Dx:    ICD-10-CM ICD-9-CM   1. 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 with type 1 diabetes mellitus (HCC)   • Type 1 diabetes mellitus with hypoglycemia unawareness (HCC)   • Closed trimalleolar fracture of right ankle   • Displaced Maisonneuve fracture of left lower extremity   • Syndesmotic disruption of left ankle        Past Medical History:   Diagnosis Date   • Acute conjunctivitis    • Arthritis    • Brittle diabetes mellitus ([facility])     not controlled due to hypoglycemia      • CKD (chronic kidney disease)    • Diabetic retinopathy ([facility])    • Dyslipidemia    • GERD (gastroesophageal reflux disease)    • Hypertensive disorder    • Vitamin D deficiency         Past Surgical History:   Procedure Laterality Date   • ANKLE OPEN REDUCTION INTERNAL FIXATION Right 2022    Procedure: rightANKLE OPEN REDUCTION INTERNAL FIXATION;  Surgeon: Familia Infante DPM;  Location: [facility];  Service: Podiatry;  Laterality: Right;   •  SECTION      with tubal   • DILATATION AND CURETTAGE          PT Ortho     Row Name 22 0900       Precautions and Contraindications    Precautions uncontrolled type 1  -EM    Contraindications HTN- monitor BP and blood sugar  -EM       Vital Signs    Vitals Comment glucose 64->81  -EM        Posture/Observations    Posture/Observations Comments Bandage present on ankle. Taut heel cord. Pt amb with unilateral crutch with tubigrip on R ankle  -EM          User Key  (r) = Recorded By, (t) = Taken By, (c) = Cosigned By    Initials Name Provider Type    Richy Mcgill PTA Physical Therapist Assistant                             PT Assessment/Plan     Row Name 08/04/22 0900          PT Assessment    Functional Limitations Impaired gait;Limitations in functional capacity and performance;Performance in leisure activities;Limitation in home management;Performance in self-care ADL;Limitations in community activities;Impaired locomotion  -EM     Impairments Balance;Gait;Edema;Pain;Range of motion;Poor body mechanics;Sensation;Muscle strength;Joint mobility  -EM     Assessment Comments Pt arin tx well. Tx limited to OCK therex in supine due to low glucose this date. ROM deficits noted.  -EM     Rehab Potential Good  -EM     Patient/caregiver participated in establishment of treatment plan and goals Yes  -EM     Patient would benefit from skilled therapy intervention Yes  -EM            PT Plan    PT Frequency 2x/week;3x/week  -EM     Predicted Duration of Therapy Intervention (PT) 9 weeks  -EM     PT Plan Comments MD note next tx. Cont current POC, progress as arin   -EM           User Key  (r) = Recorded By, (t) = Taken By, (c) = Cosigned By    Initials Name Provider Type    Richy Mcgill PTA Physical Therapist Assistant                 Modalities     Row Name 08/04/22 0900             Subjective Pain    Pre-Treatment Pain Level 0  -EM      Post-Treatment Pain Level 3  -EM              Ice    Patient denies application of Ice Yes  -EM      Patient reports will apply ice at home to involved area Yes  -EM            User Key  (r) = Recorded By, (t) = Taken By, (c) = Cosigned By    Initials Name Provider Type    Richy Mcgill PTA Physical Therapist Assistant               OP Exercises     Row Name  "08/04/22 0900             Subjective Comments    Subjective Comments Glucose running low this AM (64) up to 81 by tx end  -EM              Subjective Pain    Able to rate subjective pain? yes  -EM      Pre-Treatment Pain Level 0  -EM      Post-Treatment Pain Level 3  -EM              Exercise 1    Exercise Name 1 Ankle Circles: CW, CCW  -EM      Sets 1 1  -EM      Reps 1 20  -EM              Exercise 2    Exercise Name 2 4 Way Ankle ROM(isometric with eversion)  -EM      Sets 2 1  -EM      Reps 2 20  -EM              Exercise 3    Exercise Name 3 Seated CR/TR  -EM      Sets 3 1  -EM      Reps 3 20  -EM              Exercise 4    Exercise Name 4 PROM  -EM      Time 4 10'  -EM              Exercise 5    Exercise Name 5 deferrs ice  -EM            User Key  (r) = Recorded By, (t) = Taken By, (c) = Cosigned By    Initials Name Provider Type    EM Richy Crawford, PTA Physical Therapist Assistant                              PT OP Goals     Row Name 08/04/22 1000          PT Short Term Goals    STG Date to Achieve 08/18/22  -EM     STG 1 DF 5 degrees AROM CKC  -EM     STG 1 Progress Progressing  2  -EM     STG 2 Ambulate heel to toe pattern with or without AD  -EM     STG 2 Progress Not Met  -EM            Long Term Goals    LTG Date to Achieve 09/08/22  -EM     LTG 1 DF AROM 10 degrees or better  -EM     LTG 1 Progress Not Met  -EM     LTG 2 ambulate without AD without antalgics-heel to toe pattern  -EM     LTG 2 Progress Not Met  -EM     LTG 3 ankle strength ev/inv 4+/5 or better  -EM     LTG 3 Progress Not Met  -EM     LTG 4 SLS 3\" level ground safely  -EM     LTG 4 Progress Not Met  -EM            Time Calculation    PT Goal Re-Cert Due Date 08/16/22  -EM           User Key  (r) = Recorded By, (t) = Taken By, (c) = Cosigned By    Initials Name Provider Type    EM Richy Crawford, PTA Physical Therapist Assistant                               Time Calculation:   Start Time: 0932  Stop Time: 1015  Time Calculation (min): " 43 min  Total Timed Code Minutes- PT: 43 minute(s)  Therapy Charges for Today     Code Description Service Date Service Provider Modifiers Qty    97437586320 HC PT THER PROC EA 15 MIN 8/4/2022 Richy Crawford, PTA GP, CQ 3                    Richy Crawford, RADHA  8/4/2022

## 2022-08-09 ENCOUNTER — HOSPITAL ENCOUNTER (OUTPATIENT)
Dept: PHYSICAL THERAPY | Facility: HOSPITAL | Age: 52
Setting detail: THERAPIES SERIES
Discharge: HOME | End: 2022-08-09

## 2022-08-09 DIAGNOSIS — S82.851D CLOSED TRIMALLEOLAR FRACTURE OF RIGHT ANKLE WITH ROUTINE HEALING, SUBSEQUENT ENCOUNTER: Primary | ICD-10-CM

## 2022-08-09 PROCEDURE — 97110 THERAPEUTIC EXERCISES: CPT

## 2022-08-09 NOTE — THERAPY TREATMENT NOTE
Outpatient Physical Therapy Ortho Treatment Note  [facility]     Patient Name: Amanda Padron  : 1970  MRN: [id number]  Today's Date: 2022      Visit Date: 2022  Attendance:  Subjective improvement:45%  Recert: 22  MD Appointment: 8/10/22    Visit Dx:    ICD-10-CM ICD-9-CM   1. 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 with type 1 diabetes mellitus (HCC)   • Type 1 diabetes mellitus with hypoglycemia unawareness ([facility])   • Closed trimalleolar fracture of right ankle   • Displaced Maisonneuve fracture of left lower extremity   • Syndesmotic disruption of left ankle        Past Medical History:   Diagnosis Date   • Acute conjunctivitis    • Arthritis    • Brittle diabetes mellitus ([facility])     not controlled due to hypoglycemia      • CKD (chronic kidney disease)    • Diabetic retinopathy ([facility])    • Dyslipidemia    • GERD (gastroesophageal reflux disease)    • Hypertensive disorder    • Vitamin D deficiency         Past Surgical History:   Procedure Laterality Date   • ANKLE OPEN REDUCTION INTERNAL FIXATION Right 2022    Procedure: rightANKLE OPEN REDUCTION INTERNAL FIXATION;  Surgeon: Familia Infante DPM;  Location: [facility];  Service: Podiatry;  Laterality: Right;   •  SECTION      with tubal   • DILATATION AND CURETTAGE          PT Ortho     Row Name 22 1000       Precautions and Contraindications    Precautions uncontrolled type 1   -EM    Contraindications HTN- monitor BP and blood sugar  -EM       Vital Signs    Vitals Comment Glucose 147  -EM       Posture/Observations     Posture/Observations Comments Bandage present on ankle. Taut heel cord. Pt amb with unilateral crutch with tubigrip on R ankle  -EM       Right Lower Ext    Rt Ankle Dorsiflexion AROM 85  -EM    Rt Ankle Plantarflexion AROM 40  -EM    Rt Ankle Inversion AROM 18  -EM    Rt Ankle Eversion AROM 9  -EM       MMT Right Lower Ext    Rt Ankle Plantarflexion MMT, Gross Movement (5/5) normal  -EM    Rt Ankle Dorsiflexion MMT, Gross Movement (4+/5) good plus  -EM    Rt Ankle Subtalar Inversion MT, Gross Movement (4-/5) good minus  -EM    Rt Ankle Subtalar Eversion MMT, Gross Movement (3+/5) fair plus  -EM    Rt Lower Extremity Comments  available ROM  -EM          User Key  (r) = Recorded By, (t) = Taken By, (c) = Cosigned By    Initials Name Provider Type    EM Richy Crawford, PTA Physical Therapist Assistant                             PT Assessment/Plan     Row Name 08/09/22 1000          PT Assessment    Functional Limitations Impaired gait;Limitations in functional capacity and performance;Performance in leisure activities;Limitation in home management;Performance in self-care ADL;Limitations in community activities;Impaired locomotion  -EM     Impairments Balance;Gait;Edema;Pain;Range of motion;Poor body mechanics;Sensation;Muscle strength;Joint mobility  -EM     Assessment Comments Pt arin tx well with improved AROM with PF, IV, and EV this date. No goals met, but good progress is noted.  -EM     Rehab Potential Good  -EM     Patient/caregiver participated in establishment of treatment plan and goals Yes  -EM     Patient would benefit from skilled therapy intervention Yes  -EM            PT Plan    PT Frequency 2x/week;3x/week  -EM     Predicted Duration of Therapy Intervention (PT) 9 weeks  -EM     PT Plan Comments Add manual to ankle  inverters. Initiate ankle TB and balance activities. Gt train with SC vs no AD as indicated.  -EM           User Key  (r) = Recorded By, (t) = Taken By, (c) = Cosigned By    Initials  "Name Provider Type    Richy Mcgill, RADHA Physical Therapist Assistant                   OP Exercises     Row Name 08/09/22 1000             Subjective Comments    Subjective Comments Glucose is 147 upon arrival. Pt reports last tx went well. She been having restless legs and cramps at night.  -EM              Subjective Pain    Able to rate subjective pain? yes  -EM      Pre-Treatment Pain Level 0  -EM      Post-Treatment Pain Level 2  -EM              Exercise 1    Exercise Name 1 PRO II-4.0  -EM      Time 1 10'  -EM              Exercise 2    Exercise Name 2 Incline Calf S  -EM      Sets 2 3  -EM      Time 2 30\"  -EM              Exercise 3    Exercise Name 3 Ankle Circles: CW, CCW  -EM              Exercise 4    Exercise Name 4 Seated /TR  -EM      Sets 4 1  -EM      Reps 4 20  -EM            User Key  (r) = Recorded By, (t) = Taken By, (c) = Cosigned By    Initials Name Provider Type    Richy Mcgill, PTA Physical Therapist Assistant                              PT OP Goals     Row Name 08/09/22 1100          PT Short Term Goals    STG Date to Achieve 08/18/22  -EM     STG 1 DF 5 degrees AROM CKC  -EM     STG 1 Progress Progressing  2  -EM     STG 2 Ambulate heel to toe pattern with or without AD  -EM     STG 2 Progress Not Met  -EM            Long Term Goals    LTG Date to Achieve 09/08/22  -EM     LTG 1 DF AROM 10 degrees or better  -EM     LTG 1 Progress Not Met  -EM     LTG 2 ambulate without AD without antalgics-heel to toe pattern  -EM     LTG 2 Progress Not Met  -EM     LTG 3 ankle strength ev/inv 4+/5 or better  -EM     LTG 3 Progress Not Met  -EM     LTG 4 SLS 3\" level ground safely  -EM     LTG 4 Progress Not Met  -EM            Time Calculation    PT Goal Re-Cert Due Date 08/16/22  -EM           User Key  (r) = Recorded By, (t) = Taken By, (c) = Cosigned By    Initials Name Provider Type    Richy Mcgill, RADHA Physical Therapist Assistant                               Time Calculation: "   Start Time: 1018  Stop Time: 1106  Time Calculation (min): 48 min  Total Timed Code Minutes- PT: 48 minute(s)  Therapy Charges for Today     Code Description Service Date Service Provider Modifiers Qty    69952014661 HC PT THER PROC EA 15 MIN 8/9/2022 Richy Crawford, PTA GP, CQ 3                    Richy Crawford, RADHA  8/9/2022

## 2022-08-10 ENCOUNTER — OFFICE VISIT (OUTPATIENT)
Dept: PODIATRY | Facility: CLINIC | Age: 52
End: 2022-08-10

## 2022-08-10 VITALS — HEIGHT: 63 IN | WEIGHT: 174 LBS | BODY MASS INDEX: 30.83 KG/M2 | OXYGEN SATURATION: 99 % | HEART RATE: 72 BPM

## 2022-08-10 DIAGNOSIS — S82.851D CLOSED TRIMALLEOLAR FRACTURE OF RIGHT ANKLE WITH ROUTINE HEALING, SUBSEQUENT ENCOUNTER: Primary | ICD-10-CM

## 2022-08-10 PROCEDURE — 99024 POSTOP FOLLOW-UP VISIT: CPT | Performed by: PODIATRIST

## 2022-08-10 NOTE — PROGRESS NOTES
Amanda Padron  1970  51 y.o. female   PCP: 2022  BC:  218 per pt    08/10/2022         Chief Complaint   Patient presents with   • Follow-up       History of Present Illness    Amanda Padron is a 51 y.o.female presents to clinic today for her post op appointment. She has an ankle ORIF on 22.      Past Medical History:   Diagnosis Date   • Acute conjunctivitis    • Arthritis    • Brittle diabetes mellitus (HCC)     not controlled due to hypoglycemia      • CKD (chronic kidney disease)    • Diabetic retinopathy (HCC)    • Dyslipidemia    • GERD (gastroesophageal reflux disease)    • Hypertensive disorder    • Vitamin D deficiency          Past Surgical History:   Procedure Laterality Date   • ANKLE OPEN REDUCTION INTERNAL FIXATION Right 2022    Procedure: rightANKLE OPEN REDUCTION INTERNAL FIXATION;  Surgeon: Familia Infante DPM;  Location: [facility];  Service: Podiatry;  Laterality: Right;   •  SECTION      with tubal   • DILATATION AND CURETTAGE           Family History   Problem Relation Age of Onset   • Diabetes Sister    • Coronary artery disease Other    • Diabetes Other    • Hypertension Other        Allergies   Allergen Reactions   • Basaglar Kwikpen [Insulin Glargine] Other (See Comments)     Lantus   • Ramipril Arrhythmia and Dizziness   • Tresiba [Insulin Degludec] Other (See Comments)     Hypoglycemia         Social History     Socioeconomic History   • Marital status:    Tobacco Use   • Smoking status: Never Smoker   • Smokeless tobacco: Never Used   Vaping Use   • Vaping Use: Never used   Substance and Sexual Activity   • Alcohol use: Yes     Comment: occasionally   • Drug use: Not Currently   • Sexual activity: Defer         Current Outpatient Medications   Medication Sig Dispense Refill   • acetaminophen (TYLENOL) 650 MG 8 hr tablet Take 650 mg by mouth Every 8 (Eight) Hours As Needed for Mild Pain .     • atorvastatin (LIPITOR) 40 MG tablet Take 60 mg by  mouth Every Night.     • buPROPion (WELLBUTRIN) 100 MG tablet Take 100 mg by mouth 2 (Two) Times a Day.     • busPIRone (BUSPAR) 5 MG tablet Take 5 mg by mouth 2 (Two) Times a Day.     • cetirizine (zyrTEC) 10 MG tablet Take 10 mg by mouth Daily.     • Continuous Blood Gluc Sensor (Dexcom G6 Sensor) As Needed (glucose control). Every 10 daysDexcom G6 Sensor Kit [address]-11 Use as directed for continuous glucose monitoring 9 each 3   • Continuous Blood Gluc Sensor (FreeStyle Marquis 2 Sensor) misc 1 each Every 14 (Fourteen) Days. 2 each 4   • famotidine (PEPCID) 40 MG tablet Take 40 mg by mouth Every Night.     • furosemide (LASIX) 20 MG tablet Take 10 mg by mouth Every Other Day.     • gabapentin (Neurontin) 300 MG capsule Take 1 capsule by mouth 3 (Three) Times a Day. 30 capsule 0   • Glucagon (Gvoke HypoPen 2-Pack) 1 MG/0.2ML solution auto-injector Inject 1 mg under the skin into the appropriate area as directed As Needed (for hypoglycemia). 0.4 mL 1   • Glucose Blood (Blood Glucose Test) strip Use 4 x daily use any brand covered by insurance or same brand as before ICD10 code is E11.9 120 each 11   • hydrALAZINE (APRESOLINE) 50 MG tablet Take 50 mg by mouth 2 (Two) Times a Day.     • Insulin Glargine, 2 Unit Dial, (Toujeo Max SoloStar) 300 UNIT/ML solution pen-injector injection Inject 30 Units under the skin into the appropriate area as directed every night at bedtime. 6 mL 11   • Insulin Lispro, 1 Unit Dial, (HUMALOG) 100 UNIT/ML solution pen-injector Inject 0-20 Units under the skin into the appropriate area as directed 3 (Three) Times a Day.     • Insulin Pen Needle (Advocate Insulin Pen Needles) 31G X 5 MM misc Use to inject insulin 4 times per day 200 each 11   • Insulin Pen Needle (B-D ULTRAFINE III SHORT PEN) 31G X 8 MM misc Use to inject insulin 4 times per day 120 each 11   • Lancets Misc. (Accu-Chek Multiclix Lancet Dev) kit Provide lancing device and 5 lancets per day use as needed 150 each 11   •  "lisinopril (PRINIVIL,ZESTRIL) 10 MG tablet Take 1 tablet by mouth Daily. 30 tablet 11   • sertraline (ZOLOFT) 100 MG tablet Take 100 mg by mouth 2 (Two) Times a Day.     • tiZANidine (ZANAFLEX) 4 MG tablet Take 4 mg by mouth At Night As Needed for Muscle Spasms.       No current facility-administered medications for this visit.       Review of Systems   Constitutional: Negative.    Respiratory: Negative.    Cardiovascular: Negative.    Gastrointestinal: Negative.    Musculoskeletal:        Right ankle pain   Skin: Negative.    Psychiatric/Behavioral: Negative.          OBJECTIVE    Pulse 72   Ht 160 cm (63\")   Wt 78.9 kg (174 lb)   SpO2 99%   BMI 30.82 kg/m²     Physical Exam  Vitals reviewed.   Constitutional:       General: She is not in acute distress.     Appearance: She is well-developed.   HENT:      Head: Normocephalic and atraumatic.      Nose: Nose normal.   Eyes:      Conjunctiva/sclera: Conjunctivae normal.      Pupils: Pupils are equal, round, and reactive to light.   Pulmonary:      Effort: Pulmonary effort is normal. No respiratory distress.      Breath sounds: No wheezing.   Musculoskeletal:         General: No deformity. Normal range of motion.   Skin:     General: Skin is warm and dry.      Capillary Refill: Capillary refill takes less than 2 seconds.   Neurological:      Mental Status: She is alert and oriented to person, place, and time.   Psychiatric:         Behavior: Behavior normal.         Thought Content: Thought content normal.          Right lower extremity.Moderate edema. Negative Homans.  Ankle joint range of motion is decreased without pain.  Procedures        ASSESSMENT AND PLAN    Diagnoses and all orders for this visit:    1. Closed trimalleolar fracture of right ankle with routine healing, subsequent encounter (Primary)  -     XR Ankle 3+ View Right        -Patient doing well postoperatively.  Continue to progress activity as tolerated.  Recheck 4 weeks          This document " has been electronically signed by Familia Infante DPM on August 13, 2022 09:34 CDT     8/13/2022  09:34 CDT

## 2022-08-11 ENCOUNTER — HOSPITAL ENCOUNTER (OUTPATIENT)
Dept: PHYSICAL THERAPY | Facility: HOSPITAL | Age: 52
Setting detail: THERAPIES SERIES
Discharge: HOME | End: 2022-08-11

## 2022-08-11 DIAGNOSIS — S82.851D CLOSED TRIMALLEOLAR FRACTURE OF RIGHT ANKLE WITH ROUTINE HEALING, SUBSEQUENT ENCOUNTER: Primary | ICD-10-CM

## 2022-08-11 PROCEDURE — 97110 THERAPEUTIC EXERCISES: CPT | Performed by: PHYSICAL THERAPIST

## 2022-08-11 PROCEDURE — 97112 NEUROMUSCULAR REEDUCATION: CPT | Performed by: PHYSICAL THERAPIST

## 2022-08-11 NOTE — THERAPY TREATMENT NOTE
Outpatient Physical Therapy Ortho Treatment Note  [facility]     Patient Name: Amanda Padron  : 1970  MRN: [id number]  Today's Date: 2022      Visit Date: 2022  Attendance:  Subjective improvement:45%  Recert: 22  MD Appointment: 8/10/22  Visit Dx:    ICD-10-CM ICD-9-CM   1. 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 with type 1 diabetes mellitus (HCC)   • Type 1 diabetes mellitus with hypoglycemia unawareness (HCC)   • Closed trimalleolar fracture of right ankle   • Displaced Maisonneuve fracture of left lower extremity   • Syndesmotic disruption of left ankle        Past Medical History:   Diagnosis Date   • Acute conjunctivitis    • Arthritis    • Brittle diabetes mellitus (HCC)     not controlled due to hypoglycemia      • CKD (chronic kidney disease)    • Diabetic retinopathy (HCC)    • Dyslipidemia    • GERD (gastroesophageal reflux disease)    • Hypertensive disorder    • Vitamin D deficiency         Past Surgical History:   Procedure Laterality Date   • ANKLE OPEN REDUCTION INTERNAL FIXATION Right 2022    Procedure: rightANKLE OPEN REDUCTION INTERNAL FIXATION;  Surgeon: Familia Infante DPM;  Location: [facility];  Service: Podiatry;  Laterality: Right;   •  SECTION      with tubal   • DILATATION AND CURETTAGE          PT Ortho     Row Name 22 0930       Subjective Comments    Subjective Comments Glucose levels 313 at the start of the treatment. Patient reports not symptomatic.  -BS       Precautions and Contraindications    Precautions uncontrolled type 1  -BS     "Contraindications HTN- monitor BP and blood sugar  -BS       Subjective Pain    Able to rate subjective pain? yes  -BS    Pre-Treatment Pain Level 0  -BS          User Key  (r) = Recorded By, (t) = Taken By, (c) = Cosigned By    Initials Name Provider Type    Montana Cedeño, PT Physical Therapist                             PT Assessment/Plan     Row Name 08/11/22 0930          PT Assessment    Assessment Comments Good tolerance to resisted 4 way ankle. Limited most of session to mat work due to high (331) glucose levels during the session. Pt asymptomatic.  -BS     Rehab Potential Good  -BS     Patient/caregiver participated in establishment of treatment plan and goals Yes  -BS     Patient would benefit from skilled therapy intervention Yes  -BS            PT Plan    PT Frequency 2x/week;3x/week  -BS     Predicted Duration of Therapy Intervention (PT) 9 wks  -BS     PT Plan Comments advance per poc.  -BS           User Key  (r) = Recorded By, (t) = Taken By, (c) = Cosigned By    Initials Name Provider Type    Montana Cedeño, PT Physical Therapist                   OP Exercises     Row Name 08/11/22 0930             Subjective Comments    Subjective Comments Glucose levels 313 at the start of the treatment. Patient reports not symptomatic.  -BS              Subjective Pain    Able to rate subjective pain? yes  -BS      Pre-Treatment Pain Level 0  -BS      Post-Treatment Pain Level 3  -BS              Exercise 1    Exercise Name 1 PRO II-4.0  -BS      Time 1 10'  -BS              Exercise 2    Exercise Name 2 4 way ankle w/ red TB  -BS      Sets 2 1  -BS      Reps 2 20 ea  -BS              Exercise 3    Exercise Name 3 Ankle Circles: CW, CCW  -BS      Sets 3 1  -BS      Reps 3 20 ea  -BS              Exercise 4    Exercise Name 4 long sitting gastroc S w/ strap  -BS      Sets 4 1  -BS      Reps 4 3  -BS      Time 4 30\" hold  -BS              Exercise 5    Exercise Name 5 standing NBOS on Airex  -BS      Time 5 " "2 mins  -BS              Exercise 6    Exercise Name 6 standing in partial tandem on Airex  -BS      Time 6 3 min total, leading with each LE separately  -BS            User Key  (r) = Recorded By, (t) = Taken By, (c) = Cosigned By    Initials Name Provider Type    Montana Cedeño, PT Physical Therapist                              PT OP Goals     Row Name 08/11/22 0930          PT Short Term Goals    STG Date to Achieve 08/18/22  -BS     STG 1 DF 5 degrees AROM CKC  -BS     STG 1 Progress Progressing  2  -BS     STG 2 Ambulate heel to toe pattern with or without AD  -BS     STG 2 Progress Not Met  -BS            Long Term Goals    LTG Date to Achieve 09/08/22  -BS     LTG 1 DF AROM 10 degrees or better  -BS     LTG 1 Progress Not Met  -BS     LTG 2 ambulate without AD without antalgics-heel to toe pattern  -BS     LTG 2 Progress Not Met  -BS     LTG 3 ankle strength ev/inv 4+/5 or better  -BS     LTG 3 Progress Not Met  -BS     LTG 4 SLS 3\" level ground safely  -BS     LTG 4 Progress Not Met  -BS            Time Calculation    PT Goal Re-Cert Due Date 08/16/22  -BS           User Key  (r) = Recorded By, (t) = Taken By, (c) = Cosigned By    Initials Name Provider Type    Montana Cedeño PT Physical Therapist                               Time Calculation:   Start Time: 0930  Stop Time: 1015  Time Calculation (min): 45 min  Total Timed Code Minutes- PT: 45 minute(s)  Therapy Charges for Today     Code Description Service Date Service Provider Modifiers Qty    57678592877  PT NEUROMUSC RE EDUCATION EA 15 MIN 8/11/2022 Montana Argueta, PT GP 1    42579081549 HC PT THER PROC EA 15 MIN 8/11/2022 Montana Argueta, PT GP 2                    Montana Argueta, PT  8/11/2022     "

## 2022-08-16 ENCOUNTER — APPOINTMENT (OUTPATIENT)
Dept: PHYSICAL THERAPY | Facility: HOSPITAL | Age: 52
End: 2022-08-16

## 2022-08-18 ENCOUNTER — HOSPITAL ENCOUNTER (OUTPATIENT)
Dept: PHYSICAL THERAPY | Facility: HOSPITAL | Age: 52
Setting detail: THERAPIES SERIES
Discharge: HOME | End: 2022-08-18

## 2022-08-18 DIAGNOSIS — S82.851D CLOSED TRIMALLEOLAR FRACTURE OF RIGHT ANKLE WITH ROUTINE HEALING, SUBSEQUENT ENCOUNTER: Primary | ICD-10-CM

## 2022-08-18 PROCEDURE — 97535 SELF CARE MNGMENT TRAINING: CPT | Performed by: PHYSICAL THERAPIST

## 2022-08-18 PROCEDURE — 97140 MANUAL THERAPY 1/> REGIONS: CPT | Performed by: PHYSICAL THERAPIST

## 2022-08-19 NOTE — THERAPY PROGRESS REPORT/RE-CERT
Outpatient Physical Therapy Ortho Progress Note  [facility]     Patient Name: Amanda Padron  : 1970  MRN: [id number]  Today's Date: 2022      Visit Date: 2022    Visit Dx:    ICD-10-CM ICD-9-CM   1. 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 with type 1 diabetes mellitus (HCC)   • Type 1 diabetes mellitus with hypoglycemia unawareness ([facility])   • Closed trimalleolar fracture of right ankle   • Displaced Maisonneuve fracture of left lower extremity   • Syndesmotic disruption of left ankle        Past Medical History:   Diagnosis Date   • Acute conjunctivitis    • Arthritis    • Brittle diabetes mellitus ([facility])     not controlled due to hypoglycemia      • CKD (chronic kidney disease)    • Diabetic retinopathy ([facility])    • Dyslipidemia    • GERD (gastroesophageal reflux disease)    • Hypertensive disorder    • Vitamin D deficiency         Past Surgical History:   Procedure Laterality Date   • ANKLE OPEN REDUCTION INTERNAL FIXATION Right 2022    Procedure: rightANKLE OPEN REDUCTION INTERNAL FIXATION;  Surgeon: Familia Infante DPM;  Location: [facility];  Service: Podiatry;  Laterality: Right;   •  SECTION      with tubal   • DILATATION AND CURETTAGE          PT Ortho     Row Name 22 1015       Precautions and Contraindications    Precautions uncontrolled type 1  -BB    Contraindications HTN- monitor BP and blood sugar  -BB       Subjective Pain    Post-Treatment Pain Level 3  -BB       Vital Signs    Vitals Comment Glucose: 241  -BB       Right Lower Ext    Rt Ankle Dorsiflexion AROM 3  degrees from CKC  -BB    Rt Ankle Plantarflexion AROM 35  -BB    Rt Ankle Inversion AROM 25  -BB    Rt Ankle Eversion AROM 3  -BB       MMT Right Lower Ext    Rt Ankle Plantarflexion MMT, Gross Movement (4/5) good  unable to Calf raise  -BB    Rt Ankle Dorsiflexion MMT, Gross Movement (4-/5) good minus  in available range  -BB    Rt Ankle Subtalar Inversion MT, Gross Movement --  deferred  -BB    Rt Ankle Subtalar Eversion MMT, Gross Movement --  deferred  -BB       Sensation    Additional Comments Notes decreased sensation of toe area but reports less than before. No hypersenstivity noted  -BB          User Key  (r) = Recorded By, (t) = Taken By, (c) = Cosigned By    Initials Name Provider Type    Funmi Skinner PT DPT Physical Therapist                             PT Assessment/Plan     Row Name 08/18/22 1015          PT Assessment    Functional Limitations Impaired gait;Limitations in functional capacity and performance;Performance in leisure activities;Limitation in home management;Performance in self-care ADL;Limitations in community activities;Impaired locomotion  -BB     Impairments Balance;Gait;Edema;Pain;Range of motion;Poor body mechanics;Sensation;Muscle strength;Joint mobility  -BB     Assessment Comments Patient presents today with improving gait, balance and strength but remains a significant deficit limiting endurance and gait pattern. PAtient remains to functional ankle strength and stability and ROM limiting gait and gait safety. PAtient could continue to benefit from skilled PT to improve gait safety and mobility.  -BB     Rehab Potential Good  -BB     Patient/caregiver participated in establishment of treatment plan and goals Yes  -BB     Patient would benefit from skilled therapy intervention Yes  -BB            PT Plan    PT Frequency 2x/week  -BB     Predicted Duration of Therapy Intervention (PT) 6 weeks  -BB     PT Plan Comments start doing ROM sitting edge of pool PRN for mobility (to  "monitor skin reaction to chemicals) then progress with strength, ROM, manual, stretching, evertor strength, dynamic gait and balance  -BB           User Key  (r) = Recorded By, (t) = Taken By, (c) = Cosigned By    Initials Name Provider Type    Funmi Skinner, PT DPT Physical Therapist                   OP Exercises     Row Name 08/18/22 1015             Subjective Comments    Subjective Comments Reports feeling 50% better. Notes last session when MMT noted a pull in ankle on outside and been sore since. Notes walking at car show in uneven terrain over weekend. Does HEP for ROM.  -BB              Subjective Pain    Able to rate subjective pain? yes  -BB      Pre-Treatment Pain Level 3  \"burning\" outside of ankle  -BB      Post-Treatment Pain Level 3  -BB              Exercise 1    Exercise Name 1 recheck/poc  -BB              Exercise 2    Exercise Name 2 MFR with free up for ankle ev/inv and gastroc complex  -BB      Time 2 30'  -BB              Exercise 3    Exercise Name 3 DF stretch  -BB      Sets 3 3  -BB      Time 3 30'  -BB              Exercise 4    Exercise Name 4 gait mechanics education  -BB      Time 4 5'  -BB              Exercise 5    Exercise Name 5 HEP reviewed  -BB            User Key  (r) = Recorded By, (t) = Taken By, (c) = Cosigned By    Initials Name Provider Type    Funmi Skinner, PT DPT Physical Therapist                              PT OP Goals     Row Name 08/18/22 1015          PT Short Term Goals    STG Date to Achieve 08/18/22  -BB     STG 1 DF 5 degrees AROM CKC  -BB     STG 1 Progress Progressing  2  -BB     STG 2 Ambulate heel to toe pattern with or without AD  -BB     STG 2 Progress Not Met  -BB            Long Term Goals    LTG Date to Achieve 09/08/22  -BB     LTG 1 DF AROM 10 degrees or better  -BB     LTG 1 Progress Not Met  -BB     LTG 2 ambulate without AD without antalgics-heel to toe pattern  -BB     LTG 2 Progress Not Met  -BB     LTG 3 ankle strength ev/inv 4+/5 or " "better  -BB     LTG 3 Progress Not Met  -BB     LTG 4 SLS 3\" level ground safely  -BB     LTG 4 Progress Not Met  -BB            Time Calculation    PT Goal Re-Cert Due Date 09/16/22  -BB           User Key  (r) = Recorded By, (t) = Taken By, (c) = Cosigned By    Initials Name Provider Type    Funmi Skinner, PT DPT Physical Therapist                               Time Calculation:   Start Time: 1015  Stop Time: 1100  Time Calculation (min): 45 min  Therapy Charges for Today     Code Description Service Date Service Provider Modifiers Qty    42501624713 HC PT SELF CARE/MGMT/TRAIN EA 15 MIN 8/18/2022 Funmi Burdick, PT DPT GP 1    04566815455 HC PT MANUAL THERAPY EA 15 MIN 8/18/2022 Funmi Burdick, PT DPT GP 2                    Funmi Burdick, PT DPT  8/19/2022     "

## 2022-08-22 ENCOUNTER — HOSPITAL ENCOUNTER (OUTPATIENT)
Dept: PHYSICAL THERAPY | Facility: HOSPITAL | Age: 52
Setting detail: THERAPIES SERIES
Discharge: HOME | End: 2022-08-22

## 2022-08-22 DIAGNOSIS — S82.851D CLOSED TRIMALLEOLAR FRACTURE OF RIGHT ANKLE WITH ROUTINE HEALING, SUBSEQUENT ENCOUNTER: Primary | ICD-10-CM

## 2022-08-22 PROCEDURE — 97110 THERAPEUTIC EXERCISES: CPT | Performed by: PHYSICAL THERAPIST

## 2022-08-22 PROCEDURE — 97140 MANUAL THERAPY 1/> REGIONS: CPT | Performed by: PHYSICAL THERAPIST

## 2022-08-22 NOTE — THERAPY TREATMENT NOTE
Outpatient Physical Therapy Ortho Treatment Note  [facility]     Patient Name: Amanda Padron  : 1970  MRN: [id number]  Today's Date: 2022      Visit Date: 2022  Attendance:   Subjective % Improvement: not noted  Recert Date: 22   MD appointment: tbd      Visit Dx:    ICD-10-CM ICD-9-CM   1. Closed trimalleolar fracture of right ankle with routine healing, subsequent encounter  S82.851D V54.19       Patient Active Problem List   Diagnosis   • Hypertension associated with diabetes ([facility])   • Mixed diabetic hyperlipidemia associated with type 1 diabetes mellitus (HCC)   • Diabetic nephropathy associated with type 1 diabetes mellitus (HCC)   • Severe nonproliferative diabetic retinopathy with macular edema associated with type 1 diabetes mellitus (HCC)   • Type 1 diabetes mellitus with hypoglycemia unawareness ([facility])   • Closed trimalleolar fracture of right ankle   • Displaced Maisonneuve fracture of left lower extremity   • Syndesmotic disruption of left ankle        Past Medical History:   Diagnosis Date   • Acute conjunctivitis    • Arthritis    • Brittle diabetes mellitus (HCC)     not controlled due to hypoglycemia      • CKD (chronic kidney disease)    • Diabetic retinopathy ([facility])    • Dyslipidemia    • GERD (gastroesophageal reflux disease)    • Hypertensive disorder    • Vitamin D deficiency         Past Surgical History:   Procedure Laterality Date   • ANKLE OPEN REDUCTION INTERNAL FIXATION Right 2022    Procedure: rightANKLE OPEN REDUCTION INTERNAL FIXATION;  Surgeon: Familia Infante DPM;  Location: [facility];  Service: Podiatry;  Laterality: Right;   •  SECTION      with tubal   • DILATATION AND CURETTAGE          PT Ortho     Row Name 22 1011       Precautions and Contraindications    Precautions uncontrolled type 1  -BB    Contraindications HTN- monitor BP and blood sugar  -BB       Subjective Pain    Able to rate subjective pain? yes  -BB     "Pre-Treatment Pain Level 3  -BB    Post-Treatment Pain Level 3  -BB       Vital Signs    Vitals Comment glucose: 212, mid stance antalgics with wide base of support  -BB       Posture/Observations    Posture/Observations Comments small scab on lateral incision covered with bandaide  -BB          User Key  (r) = Recorded By, (t) = Taken By, (c) = Cosigned By    Initials Name Provider Type    Funmi Skinner, PT DPT Physical Therapist                             PT Assessment/Plan     Row Name 08/22/22 1011          PT Assessment    Assessment Comments Started fludio this date with AROM with DF at about 5 degrees CKC after treatment. No adverse skin reaction to fludio noted this date. Remains tender of evertors and lateral malleolus  -BB            PT Plan    PT Frequency 2x/week  -BB     Predicted Duration of Therapy Intervention (PT) 6 weeks  -BB     PT Plan Comments monitor response to fluido. Continue ROM for DF with gait cues  -BB           User Key  (r) = Recorded By, (t) = Taken By, (c) = Cosigned By    Initials Name Provider Type    Funmi Skinner, PT DPT Physical Therapist                   OP Exercises     Row Name 08/22/22 1011             Subjective Comments    Subjective Comments Present today with reports of a sharp pain this AM on the back of the malleolus walking. Notes achilles area is hurting too. Reports concered about pool chemicals and skin reaction.  -BB              Subjective Pain    Able to rate subjective pain? yes  -BB      Pre-Treatment Pain Level 3  -BB      Post-Treatment Pain Level 3  -BB              Exercise 1    Exercise Name 1 Fluido with AROM  -BB      Time 1 12'45\"  -BB      Additional Comments ended due to feeling like toes were \"burning\" No abnormal redness noted  -BB              Exercise 2    Exercise Name 2 MFR with free up for skin, tissue and muscular mobility  -BB      Time 2 25'  -BB              Exercise 3    Exercise Name 3 DF stretch  -BB      Sets 3 3  -BB      " "Time 3 30'  -BB              Exercise 4    Exercise Name 4 ROM check CKC  -BB      Additional Comments 5 degrees  -BB            User Key  (r) = Recorded By, (t) = Taken By, (c) = Cosigned By    Initials Name Provider Type    Funmi Skinner PT DPT Physical Therapist                              PT OP Goals     Row Name 08/22/22 1011          PT Short Term Goals    STG Date to Achieve 08/18/22  -BB     STG 1 DF 5 degrees AROM CKC  -BB     STG 1 Progress Progressing;Partially Met;Ongoing  -BB     STG 1 Progress Comments 5 degrees noted after treatment  -BB     STG 2 Ambulate heel to toe pattern with or without AD  -BB     STG 2 Progress Not Met  -BB            Long Term Goals    LTG Date to Achieve 09/08/22  -BB     LTG 1 DF AROM 10 degrees or better  -BB     LTG 1 Progress Not Met  -BB     LTG 2 ambulate without AD without antalgics-heel to toe pattern  -BB     LTG 2 Progress Not Met  -BB     LTG 3 ankle strength ev/inv 4+/5 or better  -BB     LTG 3 Progress Not Met  -BB     LTG 4 SLS 3\" level ground safely  -BB     LTG 4 Progress Not Met  -BB            Time Calculation    PT Goal Re-Cert Due Date 09/16/22  -BB           User Key  (r) = Recorded By, (t) = Taken By, (c) = Cosigned By    Initials Name Provider Type    Funmi Skinner PT DPT Physical Therapist                               Time Calculation:   Start Time: 1011  Stop Time: 1100  Time Calculation (min): 49 min  Therapy Charges for Today     Code Description Service Date Service Provider Modifiers Qty    81522237979 HC PT THER PROC EA 15 MIN 8/22/2022 Funmi Burdick PT DPT GP 1    02412860769 HC PT MANUAL THERAPY EA 15 MIN 8/22/2022 Funmi Burdick PT DPT GP 1    [id number] HC PT THER SUPP EA 15 MIN 8/22/2022 Funmi Burdick PT DPT GP 1                    Funmi Burdick PT DPT  8/22/2022     "

## 2022-08-24 ENCOUNTER — HOSPITAL ENCOUNTER (OUTPATIENT)
Dept: PHYSICAL THERAPY | Facility: HOSPITAL | Age: 52
Setting detail: THERAPIES SERIES
Discharge: HOME | End: 2022-08-24

## 2022-08-24 DIAGNOSIS — S82.851D CLOSED TRIMALLEOLAR FRACTURE OF RIGHT ANKLE WITH ROUTINE HEALING, SUBSEQUENT ENCOUNTER: Primary | ICD-10-CM

## 2022-08-24 PROCEDURE — 97110 THERAPEUTIC EXERCISES: CPT | Performed by: PHYSICAL THERAPIST

## 2022-08-24 NOTE — THERAPY TREATMENT NOTE
"    Outpatient Physical Therapy Ortho Treatment Note  [facility]     Patient Name: Amanda Padron  : 1970  MRN: [id number]  Today's Date: 2022      Visit Date: 2022    Visit Dx:    ICD-10-CM ICD-9-CM   1. 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 with type 1 diabetes mellitus (HCC)   • Type 1 diabetes mellitus with hypoglycemia unawareness ([facility])   • Closed trimalleolar fracture of right ankle   • Displaced Maisonneuve fracture of left lower extremity   • Syndesmotic disruption of left ankle        Past Medical History:   Diagnosis Date   • Acute conjunctivitis    • Arthritis    • Brittle diabetes mellitus (HCC)     not controlled due to hypoglycemia      • CKD (chronic kidney disease)    • Diabetic retinopathy (HCC)    • Dyslipidemia    • GERD (gastroesophageal reflux disease)    • Hypertensive disorder    • Vitamin D deficiency         Past Surgical History:   Procedure Laterality Date   • ANKLE OPEN REDUCTION INTERNAL FIXATION Right 2022    Procedure: rightANKLE OPEN REDUCTION INTERNAL FIXATION;  Surgeon: Familia Infante DPM;  Location: [facility];  Service: Podiatry;  Laterality: Right;   •  SECTION      with tubal   • DILATATION AND CURETTAGE          PT Ortho     Row Name 22 1224       Subjective Comments    Subjective Comments Notes at a certain point with motion its like a \"abrupt stop\". Notes medial ankle \"grabbing\" type pain.  -BB       Precautions and Contraindications    Precautions uncontrolled type 1  -BB    Contraindications HTN- monitor BP and blood sugar  -BB       " "Subjective Pain    Able to rate subjective pain? yes  -BB    Pre-Treatment Pain Level 2  -BB    Post-Treatment Pain Level 2  -BB       Vital Signs    Vitals Comment glucose: 243  -BB       Posture/Observations    Posture/Observations Comments small scab incision pin size. ambulates with wide base of support with ER foot.  -BB       Right Lower Ext    Rt Ankle Dorsiflexion AROM 4 degrees CKC DF  -BB          User Key  (r) = Recorded By, (t) = Taken By, (c) = Cosigned By    Initials Name Provider Type    Funmi Skinner PT DPT Physical Therapist                             PT Assessment/Plan     Row Name 08/24/22 1105          PT Assessment    Assessment Comments Slow improving DF AROM noted with a firm endfeel. Cues improves gait pattern. Fluido with AROM appears to assist mobility.  -BB            PT Plan    PT Frequency 2x/week  -BB     Predicted Duration of Therapy Intervention (PT) 6 weeks  -BB     PT Plan Comments progress DF AROM and gait mechanics  -BB           User Key  (r) = Recorded By, (t) = Taken By, (c) = Cosigned By    Initials Name Provider Type    Funmi Skinner, PT DPT Physical Therapist                   OP Exercises     Row Name 08/24/22 1105             Subjective Comments    Subjective Comments Notes at a certain point with motion its like a \"abrupt stop\". Notes medial ankle \"grabbing\" type pain.  -BB              Subjective Pain    Able to rate subjective pain? yes  -BB      Pre-Treatment Pain Level 2  -BB      Post-Treatment Pain Level 2  -BB              Exercise 1    Exercise Name 1 DF AROM CKC  -BB              Exercise 2    Exercise Name 2 Fludio with AROM  -BB      Time 2 12'  -BB              Exercise 3    Exercise Name 3 wobble board DF/PF  -BB      Time 3 30  -BB      Additional Comments DF/PF and ev/inv  -BB              Exercise 4    Exercise Name 4 standing DF stretch  -BB      Sets 4 3  -BB      Time 4 30\"  -BB      Additional Comments HEP daily 2-3x  -BB              " "Exercise 5    Exercise Name 5 HR standing narrow base of support  -BB      Reps 5 10  -BB              Exercise 6    Exercise Name 6 standing TR  -BB      Reps 6 10  -BB              Exercise 7    Exercise Name 7 gait with mirror for avoiding ER of foot  -BB      Time 7 3'  -BB            User Key  (r) = Recorded By, (t) = Taken By, (c) = Cosigned By    Initials Name Provider Type    Funmi Skinner, PT DPT Physical Therapist                              PT OP Goals     Row Name 08/24/22 1105          PT Short Term Goals    STG Date to Achieve 08/18/22  -BB     STG 1 DF 5 degrees AROM CKC  -BB     STG 1 Progress Progressing;Partially Met;Ongoing  -BB     STG 2 Ambulate heel to toe pattern with or without AD  -BB     STG 2 Progress Not Met  -BB            Long Term Goals    LTG Date to Achieve 09/08/22  -BB     LTG 1 DF AROM 10 degrees or better  -BB     LTG 1 Progress Not Met  -BB     LTG 2 ambulate without AD without antalgics-heel to toe pattern  -BB     LTG 2 Progress Not Met  -BB     LTG 3 ankle strength ev/inv 4+/5 or better  -BB     LTG 3 Progress Not Met  -BB     LTG 4 SLS 3\" level ground safely  -BB     LTG 4 Progress Not Met  -BB            Time Calculation    PT Goal Re-Cert Due Date 09/16/22  -BB           User Key  (r) = Recorded By, (t) = Taken By, (c) = Cosigned By    Initials Name Provider Type    Funmi Skinner, PT DPT Physical Therapist                               Time Calculation:   Start Time: 1105  Stop Time: 1145  Time Calculation (min): 40 min  Therapy Charges for Today     Code Description Service Date Service Provider Modifiers Qty    [id number]  PT THER PROC EA 15 MIN 8/24/2022 Funmi Burdick, PT DPT GP 3                    Funmi Burdick PT DPT  8/25/2022     "

## 2022-09-07 ENCOUNTER — OFFICE VISIT (OUTPATIENT)
Dept: PODIATRY | Facility: CLINIC | Age: 52
End: 2022-09-07

## 2022-09-07 VITALS — WEIGHT: 174 LBS | HEART RATE: 94 BPM | OXYGEN SATURATION: 99 % | BODY MASS INDEX: 30.83 KG/M2 | HEIGHT: 63 IN

## 2022-09-07 DIAGNOSIS — S82.851D CLOSED TRIMALLEOLAR FRACTURE OF RIGHT ANKLE WITH ROUTINE HEALING, SUBSEQUENT ENCOUNTER: Primary | ICD-10-CM

## 2022-09-07 PROCEDURE — 99213 OFFICE O/P EST LOW 20 MIN: CPT | Performed by: PODIATRIST

## 2022-09-07 RX ORDER — HYDROCODONE BITARTRATE AND ACETAMINOPHEN 7.5; 325 MG/1; MG/1
1 TABLET ORAL EVERY 8 HOURS PRN
Qty: 30 TABLET | Refills: 0 | Status: SHIPPED | OUTPATIENT
Start: 2022-09-07

## 2022-09-07 NOTE — PROGRESS NOTES
Amanda Padron  1970  52 y.o. female   PCP: 2022  BC: 263 per pt    2022       Chief Complaint   Patient presents with   • Right Ankle - Follow-up       History of Present Illness    Amanda Padron is a 52 y.o.female presents to clinic today for her post op appointment. She has an ankle ORIF on 22.  Finished PT two weeks ago.     Past Medical History:   Diagnosis Date   • Acute conjunctivitis    • Arthritis    • Brittle diabetes mellitus (HCC)     not controlled due to hypoglycemia      • CKD (chronic kidney disease)    • Diabetic retinopathy (HCC)    • Dyslipidemia    • GERD (gastroesophageal reflux disease)    • Hypertensive disorder    • Vitamin D deficiency          Past Surgical History:   Procedure Laterality Date   • ANKLE OPEN REDUCTION INTERNAL FIXATION Right 2022    Procedure: rightANKLE OPEN REDUCTION INTERNAL FIXATION;  Surgeon: Familia Infante DPM;  Location: [facility];  Service: Podiatry;  Laterality: Right;   •  SECTION      with tubal   • DILATATION AND CURETTAGE           Family History   Problem Relation Age of Onset   • Diabetes Sister    • Coronary artery disease Other    • Diabetes Other    • Hypertension Other        Allergies   Allergen Reactions   • Basaglar Kwikpen [Insulin Glargine] Other (See Comments)     Lantus   • Ramipril Arrhythmia and Dizziness   • Tresiba [Insulin Degludec] Other (See Comments)     Hypoglycemia         Social History     Socioeconomic History   • Marital status:    Tobacco Use   • Smoking status: Never Smoker   • Smokeless tobacco: Never Used   Vaping Use   • Vaping Use: Never used   Substance and Sexual Activity   • Alcohol use: Yes     Comment: occasionally   • Drug use: Not Currently   • Sexual activity: Defer         Current Outpatient Medications   Medication Sig Dispense Refill   • acetaminophen (TYLENOL) 650 MG 8 hr tablet Take 650 mg by mouth Every 8 (Eight) Hours As Needed for Mild Pain .     • atorvastatin  (LIPITOR) 40 MG tablet Take 60 mg by mouth Every Night.     • buPROPion (WELLBUTRIN) 100 MG tablet Take 100 mg by mouth 2 (Two) Times a Day.     • busPIRone (BUSPAR) 5 MG tablet Take 5 mg by mouth 2 (Two) Times a Day.     • cetirizine (zyrTEC) 10 MG tablet Take 10 mg by mouth Daily.     • Continuous Blood Gluc Sensor (Dexcom G6 Sensor) As Needed (glucose control). Every 10 daysDexcom G6 Sensor Kit [address]-85 Use as directed for continuous glucose monitoring 9 each 3   • Continuous Blood Gluc Sensor (FreeStyle Marquis 2 Sensor) misc 1 each Every 14 (Fourteen) Days. 2 each 4   • famotidine (PEPCID) 40 MG tablet Take 40 mg by mouth Every Night.     • furosemide (LASIX) 20 MG tablet Take 10 mg by mouth Every Other Day.     • gabapentin (Neurontin) 300 MG capsule Take 1 capsule by mouth 3 (Three) Times a Day. 30 capsule 0   • Glucagon (Gvoke HypoPen 2-Pack) 1 MG/0.2ML solution auto-injector Inject 1 mg under the skin into the appropriate area as directed As Needed (for hypoglycemia). 0.4 mL 1   • Glucose Blood (Blood Glucose Test) strip Use 4 x daily use any brand covered by insurance or same brand as before ICD10 code is E11.9 120 each 11   • hydrALAZINE (APRESOLINE) 50 MG tablet Take 50 mg by mouth 2 (Two) Times a Day.     • Insulin Glargine, 2 Unit Dial, (Toujeo Max SoloStar) 300 UNIT/ML solution pen-injector injection Inject 30 Units under the skin into the appropriate area as directed every night at bedtime. 6 mL 11   • Insulin Pen Needle (Advocate Insulin Pen Needles) 31G X 5 MM misc Use to inject insulin 4 times per day 200 each 11   • Insulin Pen Needle (B-D ULTRAFINE III SHORT PEN) 31G X 8 MM misc Use to inject insulin 4 times per day 120 each 11   • Lancets Misc. (Accu-Chek Multiclix Lancet Dev) kit Provide lancing device and 5 lancets per day use as needed 150 each 11   • lisinopril (PRINIVIL,ZESTRIL) 10 MG tablet Take 1 tablet by mouth Daily. 30 tablet 11   • sertraline (ZOLOFT) 100 MG tablet Take 100 mg by  "mouth 2 (Two) Times a Day.     • tiZANidine (ZANAFLEX) 4 MG tablet Take 4 mg by mouth At Night As Needed for Muscle Spasms.     • HumaLOG KwikPen 100 UNIT/ML solution pen-injector INJECT UP TO 20 UNITS SQ WITH MEALS 15 mL 0   • HYDROcodone-acetaminophen (Norco) 7.5-325 MG per tablet Take 1 tablet by mouth Every 8 (Eight) Hours As Needed for Moderate Pain. 30 tablet 0     No current facility-administered medications for this visit.       Review of Systems   Constitutional: Negative.    Respiratory: Negative.    Cardiovascular: Negative.    Gastrointestinal: Negative.    Musculoskeletal:        Right ankle pain   Skin: Negative.    Psychiatric/Behavioral: Negative.          OBJECTIVE    Pulse 94   Ht 160 cm (63\")   Wt 78.9 kg (174 lb)   SpO2 99%   BMI 30.82 kg/m²     Physical Exam  Vitals reviewed.   Constitutional:       General: She is not in acute distress.     Appearance: She is well-developed.   HENT:      Head: Normocephalic and atraumatic.      Nose: Nose normal.   Eyes:      Conjunctiva/sclera: Conjunctivae normal.      Pupils: Pupils are equal, round, and reactive to light.   Pulmonary:      Effort: Pulmonary effort is normal. No respiratory distress.      Breath sounds: No wheezing.   Musculoskeletal:         General: No deformity. Normal range of motion.   Skin:     General: Skin is warm and dry.      Capillary Refill: Capillary refill takes less than 2 seconds.   Neurological:      Mental Status: She is alert and oriented to person, place, and time.   Psychiatric:         Behavior: Behavior normal.         Thought Content: Thought content normal.          Right lower extremity. Trace edema. Negative Homans.  Ankle joint range of motion is decreased without pain.    Procedures        ASSESSMENT AND PLAN    Diagnoses and all orders for this visit:    1. Closed trimalleolar fracture of right ankle with routine healing, subsequent encounter (Primary)  -     XR Ankle 3+ View Right  -     " HYDROcodone-acetaminophen (Norco) 7.5-325 MG per tablet; Take 1 tablet by mouth Every 8 (Eight) Hours As Needed for Moderate Pain.  Dispense: 30 tablet; Refill: 0        -Patient doing well postoperatively.  Continue to progress activity as tolerated.  Recheck 4 weeks          This document has been electronically signed by Familia Infante DPM on September 9, 2022 09:11 CDT     9/9/2022  09:11 CDT

## 2022-09-08 RX ORDER — INSULIN LISPRO 100 [IU]/ML
INJECTION, SOLUTION INTRAVENOUS; SUBCUTANEOUS
Qty: 15 ML | Refills: 0 | Status: SHIPPED | OUTPATIENT
Start: 2022-09-08 | End: 2022-10-17

## 2022-09-09 DIAGNOSIS — S82.851D CLOSED TRIMALLEOLAR FRACTURE OF RIGHT ANKLE WITH ROUTINE HEALING, SUBSEQUENT ENCOUNTER: Primary | ICD-10-CM

## 2022-09-15 ENCOUNTER — TELEPHONE (OUTPATIENT)
Dept: PODIATRY | Facility: CLINIC | Age: 52
End: 2022-09-15

## 2022-09-15 NOTE — TELEPHONE ENCOUNTER
PT REQUESTS A CALL BACK RE PT SAYS DR OBREGON WAS CALLING IN A COMPOUND CREAM TO [facility] IN [place]  FOR PT ANKLE  SHE CALLED  [facility] TODAY AND AND THEY TOLD HER THEY HAVENT RECEIVED IT YET.  CALL BACK # [phone]. PT SAYS LEAVE A VMAIL.    THANK YOU.

## 2022-09-16 ENCOUNTER — TELEPHONE (OUTPATIENT)
Dept: ENDOCRINOLOGY | Facility: CLINIC | Age: 52
End: 2022-09-16

## 2022-09-16 NOTE — TELEPHONE ENCOUNTER
Patient called wanting to know if we have samples of Toujeo Max Solostar. She said the sensors she got this month have stopped working and she cant get a refill until next month she wants to know if we have any Marquis 2 sensors she can get.    Please Advise    Thanks

## 2022-09-19 NOTE — TELEPHONE ENCOUNTER
Pt notified to call [employer] for sensor replacement, pt verbalized that she had done this on Friday and they were sending her a new sensor. Pt also informed she could  a Toujeo Max pen sample.

## 2022-10-17 ENCOUNTER — PRE-ADMISSION TESTING (OUTPATIENT)
Dept: PREADMISSION TESTING | Facility: HOSPITAL | Age: 52
End: 2022-10-17

## 2022-10-17 VITALS
RESPIRATION RATE: 16 BRPM | DIASTOLIC BLOOD PRESSURE: 70 MMHG | BODY MASS INDEX: 32.96 KG/M2 | SYSTOLIC BLOOD PRESSURE: 158 MMHG | HEART RATE: 90 BPM | HEIGHT: 63 IN | WEIGHT: 186 LBS | OXYGEN SATURATION: 97 %

## 2022-10-17 DIAGNOSIS — E11.59 HYPERTENSION ASSOCIATED WITH DIABETES: Primary | ICD-10-CM

## 2022-10-17 DIAGNOSIS — E10.649 TYPE 1 DIABETES MELLITUS WITH HYPOGLYCEMIA UNAWARENESS: ICD-10-CM

## 2022-10-17 DIAGNOSIS — E10.649 TYPE 1 DIABETES MELLITUS WITH HYPOGLYCEMIA UNAWARENESS: Primary | ICD-10-CM

## 2022-10-17 DIAGNOSIS — I15.2 HYPERTENSION ASSOCIATED WITH DIABETES: Primary | ICD-10-CM

## 2022-10-17 LAB
ANION GAP SERPL CALCULATED.3IONS-SCNC: 14 MMOL/L (ref 5–15)
BUN SERPL-MCNC: 20 MG/DL (ref 6–20)
BUN/CREAT SERPL: 14.3 (ref 7–25)
CALCIUM SPEC-SCNC: 9.5 MG/DL (ref 8.6–10.5)
CHLORIDE SERPL-SCNC: 102 MMOL/L (ref 98–107)
CO2 SERPL-SCNC: 23 MMOL/L (ref 22–29)
CREAT SERPL-MCNC: 1.4 MG/DL (ref 0.57–1)
EGFRCR SERPLBLD CKD-EPI 2021: 44.8 ML/MIN/1.73
GLUCOSE SERPL-MCNC: 210 MG/DL (ref 65–99)
POTASSIUM SERPL-SCNC: 4 MMOL/L (ref 3.5–5.2)
SODIUM SERPL-SCNC: 139 MMOL/L (ref 136–145)

## 2022-10-17 PROCEDURE — 36415 COLL VENOUS BLD VENIPUNCTURE: CPT

## 2022-10-17 PROCEDURE — 80048 BASIC METABOLIC PNL TOTAL CA: CPT

## 2022-10-17 RX ORDER — INSULIN LISPRO 100 [IU]/ML
INJECTION, SOLUTION INTRAVENOUS; SUBCUTANEOUS
Qty: 15 ML | Refills: 0 | Status: SHIPPED | OUTPATIENT
Start: 2022-10-17

## 2022-10-17 RX ORDER — SODIUM CHLORIDE 9 MG/ML
1000 INJECTION, SOLUTION INTRAVENOUS CONTINUOUS
Status: CANCELLED | OUTPATIENT
Start: 2022-10-20

## 2022-10-17 RX ORDER — PEN NEEDLE, DIABETIC 32GX 5/32"
NEEDLE, DISPOSABLE MISCELLANEOUS
Qty: 100 EACH | Refills: 5 | Status: SHIPPED | OUTPATIENT
Start: 2022-10-17

## 2022-10-17 RX ORDER — FUROSEMIDE 20 MG/1
TABLET ORAL
Qty: 30 TABLET | Refills: 0 | Status: SHIPPED | OUTPATIENT
Start: 2022-10-17 | End: 2023-03-14 | Stop reason: SDUPTHER

## 2022-10-19 RX ORDER — BUPIVACAINE HCL/0.9 % NACL/PF 0.1 %
2 PLASTIC BAG, INJECTION (ML) EPIDURAL ONCE
Status: COMPLETED | OUTPATIENT
Start: 2022-10-20 | End: 2022-10-20

## 2022-10-20 ENCOUNTER — APPOINTMENT (OUTPATIENT)
Dept: GENERAL RADIOLOGY | Facility: HOSPITAL | Age: 52
End: 2022-10-20

## 2022-10-20 ENCOUNTER — ANESTHESIA EVENT (OUTPATIENT)
Dept: PERIOP | Facility: HOSPITAL | Age: 52
End: 2022-10-20

## 2022-10-20 ENCOUNTER — HOSPITAL ENCOUNTER (OUTPATIENT)
Facility: HOSPITAL | Age: 52
Setting detail: HOSPITAL OUTPATIENT SURGERY
Discharge: HOME | End: 2022-10-20
Attending: PODIATRIST | Admitting: PODIATRIST

## 2022-10-20 ENCOUNTER — ANESTHESIA (OUTPATIENT)
Dept: PERIOP | Facility: HOSPITAL | Age: 52
End: 2022-10-20

## 2022-10-20 VITALS
TEMPERATURE: 97.5 F | WEIGHT: 181.66 LBS | OXYGEN SATURATION: 96 % | BODY MASS INDEX: 31.01 KG/M2 | SYSTOLIC BLOOD PRESSURE: 114 MMHG | HEIGHT: 64 IN | HEART RATE: 88 BPM | RESPIRATION RATE: 20 BRPM | DIASTOLIC BLOOD PRESSURE: 55 MMHG

## 2022-10-20 DIAGNOSIS — S82.851D CLOSED TRIMALLEOLAR FRACTURE OF RIGHT ANKLE WITH ROUTINE HEALING, SUBSEQUENT ENCOUNTER: ICD-10-CM

## 2022-10-20 LAB
GLUCOSE BLDC GLUCOMTR-MCNC: 75 MG/DL (ref 70–130)
GLUCOSE BLDC GLUCOMTR-MCNC: 81 MG/DL (ref 70–130)

## 2022-10-20 PROCEDURE — 25010000002 PROPOFOL 10 MG/ML EMULSION

## 2022-10-20 PROCEDURE — 25010000002 FENTANYL CITRATE (PF) 50 MCG/ML SOLUTION

## 2022-10-20 PROCEDURE — 25010000002 CEFAZOLIN PER 500 MG: Performed by: PODIATRIST

## 2022-10-20 PROCEDURE — 25010000002 HYDROMORPHONE 1 MG/ML SOLUTION

## 2022-10-20 PROCEDURE — 76000 FLUOROSCOPY <1 HR PHYS/QHP: CPT

## 2022-10-20 PROCEDURE — 82962 GLUCOSE BLOOD TEST: CPT

## 2022-10-20 PROCEDURE — 25010000002 KETOROLAC TROMETHAMINE PER 15 MG: Performed by: ANESTHESIOLOGY

## 2022-10-20 PROCEDURE — S0260 H&P FOR SURGERY: HCPCS | Performed by: PODIATRIST

## 2022-10-20 PROCEDURE — 20680 REMOVAL OF IMPLANT DEEP: CPT | Performed by: PODIATRIST

## 2022-10-20 PROCEDURE — 25010000002 MIDAZOLAM PER 1 MG

## 2022-10-20 PROCEDURE — 25010000002 ONDANSETRON PER 1 MG

## 2022-10-20 RX ORDER — DIPHENHYDRAMINE HYDROCHLORIDE 50 MG/ML
12.5 INJECTION INTRAMUSCULAR; INTRAVENOUS
Status: DISCONTINUED | OUTPATIENT
Start: 2022-10-20 | End: 2022-10-20 | Stop reason: HOSPADM

## 2022-10-20 RX ORDER — ONDANSETRON 2 MG/ML
INJECTION INTRAMUSCULAR; INTRAVENOUS AS NEEDED
Status: DISCONTINUED | OUTPATIENT
Start: 2022-10-20 | End: 2022-10-20 | Stop reason: SURG

## 2022-10-20 RX ORDER — FLUMAZENIL 0.1 MG/ML
0.2 INJECTION INTRAVENOUS AS NEEDED
Status: DISCONTINUED | OUTPATIENT
Start: 2022-10-20 | End: 2022-10-20 | Stop reason: HOSPADM

## 2022-10-20 RX ORDER — ACETAMINOPHEN 325 MG/1
650 TABLET ORAL ONCE AS NEEDED
Status: DISCONTINUED | OUTPATIENT
Start: 2022-10-20 | End: 2022-10-20 | Stop reason: HOSPADM

## 2022-10-20 RX ORDER — LIDOCAINE HYDROCHLORIDE 20 MG/ML
INJECTION, SOLUTION INFILTRATION; PERINEURAL AS NEEDED
Status: DISCONTINUED | OUTPATIENT
Start: 2022-10-20 | End: 2022-10-20 | Stop reason: SURG

## 2022-10-20 RX ORDER — BUPIVACAINE HYDROCHLORIDE 5 MG/ML
INJECTION, SOLUTION EPIDURAL; INTRACAUDAL AS NEEDED
Status: DISCONTINUED | OUTPATIENT
Start: 2022-10-20 | End: 2022-10-20 | Stop reason: HOSPADM

## 2022-10-20 RX ORDER — PROMETHAZINE HYDROCHLORIDE 25 MG/1
25 TABLET ORAL ONCE AS NEEDED
Status: DISCONTINUED | OUTPATIENT
Start: 2022-10-20 | End: 2022-10-20 | Stop reason: HOSPADM

## 2022-10-20 RX ORDER — ONDANSETRON 2 MG/ML
4 INJECTION INTRAMUSCULAR; INTRAVENOUS ONCE AS NEEDED
Status: DISCONTINUED | OUTPATIENT
Start: 2022-10-20 | End: 2022-10-20 | Stop reason: HOSPADM

## 2022-10-20 RX ORDER — EPHEDRINE SULFATE 50 MG/ML
5 INJECTION, SOLUTION INTRAVENOUS ONCE AS NEEDED
Status: DISCONTINUED | OUTPATIENT
Start: 2022-10-20 | End: 2022-10-20 | Stop reason: HOSPADM

## 2022-10-20 RX ORDER — PROPOFOL 10 MG/ML
VIAL (ML) INTRAVENOUS AS NEEDED
Status: DISCONTINUED | OUTPATIENT
Start: 2022-10-20 | End: 2022-10-20 | Stop reason: SURG

## 2022-10-20 RX ORDER — KETOROLAC TROMETHAMINE 30 MG/ML
30 INJECTION, SOLUTION INTRAMUSCULAR; INTRAVENOUS ONCE
Status: COMPLETED | OUTPATIENT
Start: 2022-10-20 | End: 2022-10-20

## 2022-10-20 RX ORDER — FENTANYL CITRATE 50 UG/ML
INJECTION, SOLUTION INTRAMUSCULAR; INTRAVENOUS AS NEEDED
Status: DISCONTINUED | OUTPATIENT
Start: 2022-10-20 | End: 2022-10-20 | Stop reason: SURG

## 2022-10-20 RX ORDER — ACETAMINOPHEN 650 MG/1
650 SUPPOSITORY RECTAL ONCE AS NEEDED
Status: DISCONTINUED | OUTPATIENT
Start: 2022-10-20 | End: 2022-10-20 | Stop reason: HOSPADM

## 2022-10-20 RX ORDER — MIDAZOLAM HYDROCHLORIDE 1 MG/ML
INJECTION INTRAMUSCULAR; INTRAVENOUS AS NEEDED
Status: DISCONTINUED | OUTPATIENT
Start: 2022-10-20 | End: 2022-10-20 | Stop reason: SURG

## 2022-10-20 RX ORDER — SODIUM CHLORIDE 9 MG/ML
1000 INJECTION, SOLUTION INTRAVENOUS CONTINUOUS
Status: DISCONTINUED | OUTPATIENT
Start: 2022-10-20 | End: 2022-10-20 | Stop reason: HOSPADM

## 2022-10-20 RX ORDER — EPHEDRINE SULFATE 50 MG/ML
INJECTION, SOLUTION INTRAVENOUS AS NEEDED
Status: DISCONTINUED | OUTPATIENT
Start: 2022-10-20 | End: 2022-10-20 | Stop reason: SURG

## 2022-10-20 RX ORDER — PROMETHAZINE HYDROCHLORIDE 25 MG/1
25 SUPPOSITORY RECTAL ONCE AS NEEDED
Status: DISCONTINUED | OUTPATIENT
Start: 2022-10-20 | End: 2022-10-20 | Stop reason: HOSPADM

## 2022-10-20 RX ORDER — NALOXONE HCL 0.4 MG/ML
0.4 VIAL (ML) INJECTION AS NEEDED
Status: DISCONTINUED | OUTPATIENT
Start: 2022-10-20 | End: 2022-10-20 | Stop reason: HOSPADM

## 2022-10-20 RX ORDER — BACITRACIN ZINC 500 [USP'U]/G
OINTMENT TOPICAL AS NEEDED
Status: DISCONTINUED | OUTPATIENT
Start: 2022-10-20 | End: 2022-10-20 | Stop reason: HOSPADM

## 2022-10-20 RX ADMIN — EPHEDRINE SULFATE 10 MG: 50 INJECTION INTRAVENOUS at 07:28

## 2022-10-20 RX ADMIN — FENTANYL CITRATE 50 MCG: 50 INJECTION INTRAMUSCULAR; INTRAVENOUS at 07:11

## 2022-10-20 RX ADMIN — Medication 2 G: at 07:20

## 2022-10-20 RX ADMIN — HYDROMORPHONE HYDROCHLORIDE 0.5 MG: 1 INJECTION, SOLUTION INTRAMUSCULAR; INTRAVENOUS; SUBCUTANEOUS at 08:01

## 2022-10-20 RX ADMIN — HYDROMORPHONE HYDROCHLORIDE 0.5 MG: 1 INJECTION, SOLUTION INTRAMUSCULAR; INTRAVENOUS; SUBCUTANEOUS at 08:13

## 2022-10-20 RX ADMIN — PROPOFOL 50 MG: 10 INJECTION, EMULSION INTRAVENOUS at 07:18

## 2022-10-20 RX ADMIN — ONDANSETRON 4 MG: 2 INJECTION INTRAMUSCULAR; INTRAVENOUS at 07:18

## 2022-10-20 RX ADMIN — LIDOCAINE HYDROCHLORIDE 100 MG: 20 INJECTION, SOLUTION INFILTRATION; PERINEURAL at 07:13

## 2022-10-20 RX ADMIN — MIDAZOLAM HYDROCHLORIDE 2 MG: 1 INJECTION, SOLUTION INTRAMUSCULAR; INTRAVENOUS at 07:06

## 2022-10-20 RX ADMIN — PROPOFOL 150 MG: 10 INJECTION, EMULSION INTRAVENOUS at 07:15

## 2022-10-20 RX ADMIN — KETOROLAC TROMETHAMINE 30 MG: 30 INJECTION, SOLUTION INTRAMUSCULAR; INTRAVENOUS at 08:28

## 2022-10-20 RX ADMIN — SODIUM CHLORIDE 1000 ML: 9 INJECTION, SOLUTION INTRAVENOUS at 06:34

## 2022-10-20 RX ADMIN — PROPOFOL 30 MG: 10 INJECTION, EMULSION INTRAVENOUS at 07:33

## 2022-10-20 NOTE — ANESTHESIA POSTPROCEDURE EVALUATION
Patient: Amanda Padron    Procedure Summary     Date: 10/20/22 Room / Location: [facility] OR  / [facility] OR    Anesthesia Start: 0708 Anesthesia Stop: 0757    Procedure: HARDWARE REMOVAL RIGHT ANKLE (Right) Diagnosis:       Closed trimalleolar fracture of right ankle with routine healing, subsequent encounter      (Closed trimalleolar fracture of right ankle with routine healing, subsequent encounter [V65.957Q])    Surgeons: Familia Infante DPM Provider: Yvon Murry MD    Anesthesia Type: general ASA Status: 3          Anesthesia Type: general    Vitals  No vitals data found for the desired time range.          Post Anesthesia Care and Evaluation    Patient location during evaluation: PACU  Patient participation: complete - patient cannot participate  Level of consciousness: sleepy but conscious  Pain score: 0  Pain management: adequate    Airway patency: patent  Anesthetic complications: No anesthetic complications  PONV Status: none  Cardiovascular status: acceptable  Respiratory status: acceptable and room air  Hydration status: acceptable    Comments: Vital signs:     HR: 94  BP: 154/70  SpO2: 96  RR: 17  Temp: 97.3  No anesthesia care post op

## 2022-10-20 NOTE — ANESTHESIA PROCEDURE NOTES
Airway  Urgency: elective      General Information and Staff    Patient location during procedure: OR  CRNA/CAA: Latonia Corona CRNA    Indications and Patient Condition  Indications for airway management: airway protection    Preoxygenated: yes  MILS maintained throughout  Mask difficulty assessment: 0 - not attempted    Final Airway Details  Final airway type: supraglottic airway      Successful airway: I-gel  Size 3     Number of attempts at approach: 1  Assessment: lips, teeth, and gum same as pre-op

## 2022-10-20 NOTE — ANESTHESIA PREPROCEDURE EVALUATION
Anesthesia Evaluation     no history of anesthetic complications:  NPO Solid Status: > 8 hours  NPO Liquid Status: > 2 hours           Airway   Mallampati: I  TM distance: >3 FB  Neck ROM: full  No difficulty expected  Dental    (+) poor dentition    Pulmonary - normal exam    breath sounds clear to auscultation  (-) COPD, asthma, sleep apnea, not a smoker    ROS comment: snores  Cardiovascular   Exercise tolerance: good (4-7 METS)    ECG reviewed  Rhythm: regular  Rate: normal    (+) hypertension well controlled 2 medications or greater, murmur, hyperlipidemia,   (-) valvular problems/murmurs, dysrhythmias, angina, cardiac stents, DVT    ROS comment: · Left ventricular systolic function is hyperdynamic (EF > 70%).  · Left ventricular diastolic function was normal.  · Left ventricular wall thickness is consistent with moderate concentric hypertrophy.    NSR rate 93    Neuro/Psych  (+) headaches (occ), numbness (plantar fasciitis improved), psychiatric history Anxiety and Depression,    (-) seizures, TIA, CVA, weakness  GI/Hepatic/Renal/Endo    (+) obesity,  GERD well controlled,  renal disease CRI, diabetes mellitus (glu 81) type 1 well controlled using insulin,   (-) hepatitis, liver disease, no thyroid disorder    Musculoskeletal     (+) arthralgias,   Abdominal    Substance History   (-) alcohol use, drug use     OB/GYN    (-)  Pregnant        Other   arthritis (hands and back), blood dyscrasia anemia,     (-) history of cancer  ROS/Med Hx Other: Right ankle hardware                    Anesthesia Plan    ASA 3     general     intravenous induction     Anesthetic plan, risks, benefits, and alternatives have been provided, discussed and informed consent has been obtained with: patient and spouse/significant other.        CODE STATUS:

## 2022-10-22 LAB — REF LAB TEST METHOD: NORMAL

## 2022-10-24 ENCOUNTER — OFFICE VISIT (OUTPATIENT)
Dept: PODIATRY | Facility: CLINIC | Age: 52
End: 2022-10-24

## 2022-10-24 VITALS — BODY MASS INDEX: 30.9 KG/M2 | HEIGHT: 64 IN | WEIGHT: 181 LBS | OXYGEN SATURATION: 99 % | HEART RATE: 86 BPM

## 2022-10-24 DIAGNOSIS — T84.84XA PAINFUL ORTHOPAEDIC HARDWARE: Primary | ICD-10-CM

## 2022-10-24 PROCEDURE — 99024 POSTOP FOLLOW-UP VISIT: CPT | Performed by: PODIATRIST

## 2022-11-02 ENCOUNTER — OFFICE VISIT (OUTPATIENT)
Dept: PODIATRY | Facility: CLINIC | Age: 52
End: 2022-11-02

## 2022-11-02 VITALS
WEIGHT: 181 LBS | OXYGEN SATURATION: 98 % | HEART RATE: 80 BPM | HEIGHT: 64 IN | SYSTOLIC BLOOD PRESSURE: 120 MMHG | BODY MASS INDEX: 30.9 KG/M2 | DIASTOLIC BLOOD PRESSURE: 78 MMHG

## 2022-11-02 DIAGNOSIS — T84.84XA PAINFUL ORTHOPAEDIC HARDWARE: Primary | ICD-10-CM

## 2022-11-02 PROCEDURE — 99024 POSTOP FOLLOW-UP VISIT: CPT | Performed by: PODIATRIST

## 2022-11-02 NOTE — PROGRESS NOTES
Amanda Padron  1970  52 y.o. female   PCP: 2022  BC: 280 per pt    2022     Chief Complaint   Patient presents with   • Right Foot - Follow-up     Post op       History of Present Illness    Amanda Padron is a 52 y.o.female presents to clinic today for her post op appointment. She had an ankle hardware removal on 10/20/22.  .     Past Medical History:   Diagnosis Date   • Acute conjunctivitis    • Anxiety    • Arthritis    • Brittle diabetes mellitus (HCC)     not controlled due to hypoglycemia      • CKD (chronic kidney disease)    • Depression    • Diabetic retinopathy (HCC)    • Dyslipidemia    • GERD (gastroesophageal reflux disease)    • Hypertensive disorder    • Plantar fasciitis    • Vitamin D deficiency          Past Surgical History:   Procedure Laterality Date   • ANKLE OPEN REDUCTION INTERNAL FIXATION Right 2022    Procedure: rightANKLE OPEN REDUCTION INTERNAL FIXATION;  Surgeon: Familia Infante DPM;  Location: [facility];  Service: Podiatry;  Laterality: Right;   •  SECTION      with tubal   • DILATATION AND CURETTAGE     • HARDWARE REMOVAL Right 10/20/2022    Procedure: HARDWARE REMOVAL RIGHT ANKLE;  Surgeon: Familia Infante DPM;  Location: [facility];  Service: Podiatry;  Laterality: Right;         Family History   Problem Relation Age of Onset   • Diabetes Sister    • Coronary artery disease Other    • Diabetes Other    • Hypertension Other        Allergies   Allergen Reactions   • Basaglar Kwikpen [Insulin Glargine] Other (See Comments)     Lantus   • Ramipril Arrhythmia and Dizziness   • Tresiba [Insulin Degludec] Other (See Comments)     Hypoglycemia         Social History     Socioeconomic History   • Marital status:    Tobacco Use   • Smoking status: Never   • Smokeless tobacco: Never   Vaping Use   • Vaping Use: Never used   Substance and Sexual Activity   • Alcohol use: Yes     Comment: occasionally   • Drug use: Never   • Sexual activity: Defer          Current Outpatient Medications   Medication Sig Dispense Refill   • acetaminophen (TYLENOL) 650 MG 8 hr tablet Take 650 mg by mouth Every 8 (Eight) Hours As Needed for Mild Pain .     • atorvastatin (LIPITOR) 40 MG tablet Take 1 tablet by mouth Every Night.     • buPROPion (WELLBUTRIN) 100 MG tablet Take 100 mg by mouth 2 (Two) Times a Day.     • busPIRone (BUSPAR) 5 MG tablet Take 5 mg by mouth 2 (Two) Times a Day.     • cetirizine (zyrTEC) 10 MG tablet Take 10 mg by mouth Daily.     • Continuous Blood Gluc Sensor (FreeStyle Marquis 2 Sensor) misc USE AS DIRECTED EVERY  14  DAYS 2 each 0   • famotidine (PEPCID) 40 MG tablet Take 40 mg by mouth Every Night.     • furosemide (LASIX) 20 MG tablet Take 1 tablet by mouth once daily 30 tablet 0   • gabapentin (Neurontin) 300 MG capsule Take 1 capsule by mouth 3 (Three) Times a Day. 30 capsule 0   • Glucagon (Gvoke HypoPen 2-Pack) 1 MG/0.2ML solution auto-injector Inject 1 mg under the skin into the appropriate area as directed As Needed (for hypoglycemia). 0.4 mL 1   • Glucose Blood (Blood Glucose Test) strip Use 4 x daily use any brand covered by insurance or same brand as before ICD10 code is E11.9 120 each 11   • HumaLOG KwikPen 100 UNIT/ML solution pen-injector INJECT UP TO 20 UNITS SUBCUTANEOUSLY ONCE DAILY WITH MEALS 15 mL 0   • hydrALAZINE (APRESOLINE) 50 MG tablet Take 50 mg by mouth 2 (Two) Times a Day.     • HYDROcodone-acetaminophen (Norco) 7.5-325 MG per tablet Take 1 tablet by mouth Every 8 (Eight) Hours As Needed for Moderate Pain. 30 tablet 0   • Insulin Glargine, 2 Unit Dial, (Toujeo Max SoloStar) 300 UNIT/ML solution pen-injector injection Inject 30 Units under the skin into the appropriate area as directed every night at bedtime. (Patient taking differently: Inject 28 Units under the skin into the appropriate area as directed every night at bedtime.) 6 mL 11   • Insulin Pen Needle (B-D ULTRAFINE III SHORT PEN) 31G X 8 MM misc Use to inject insulin  "4 times per day 120 each 11   • Lancets Misc. (Accu-Chek Multiclix Lancet Dev) kit Provide lancing device and 5 lancets per day use as needed 150 each 11   • lisinopril (PRINIVIL,ZESTRIL) 10 MG tablet Take 1 tablet by mouth Daily. 30 tablet 11   • ReliOn Pen Needles 31G X 6 MM misc USE  4 TIMES DAILY 100 each 5   • sertraline (ZOLOFT) 100 MG tablet Take 100 mg by mouth 2 (Two) Times a Day.     • tiZANidine (ZANAFLEX) 4 MG tablet Take 4 mg by mouth At Night As Needed for Muscle Spasms.       No current facility-administered medications for this visit.       Review of Systems   Constitutional: Negative.    Respiratory: Negative.    Cardiovascular: Negative.    Gastrointestinal: Negative.    Musculoskeletal:        Right ankle pain   Skin: Negative.    Psychiatric/Behavioral: Negative.          OBJECTIVE    /78   Pulse 80   Ht 162.6 cm (64\")   Wt 82.1 kg (181 lb)   SpO2 98%   BMI 31.07 kg/m²     Physical Exam  Vitals reviewed.   Constitutional:       General: She is not in acute distress.     Appearance: She is well-developed.   HENT:      Head: Normocephalic and atraumatic.      Nose: Nose normal.   Eyes:      Conjunctiva/sclera: Conjunctivae normal.      Pupils: Pupils are equal, round, and reactive to light.   Pulmonary:      Effort: Pulmonary effort is normal. No respiratory distress.      Breath sounds: No wheezing.   Musculoskeletal:         General: No deformity. Normal range of motion.   Skin:     General: Skin is warm and dry.      Capillary Refill: Capillary refill takes less than 2 seconds.   Neurological:      Mental Status: She is alert and oriented to person, place, and time.   Psychiatric:         Behavior: Behavior normal.         Thought Content: Thought content normal.          Right lower extremity.  Incision site well approximated.  No signs of dehiscence.    Procedures        ASSESSMENT AND PLAN    Diagnoses and all orders for this visit:    1. Painful orthopaedic hardware (HCC) " (Primary)        -Patient doing well postoperatively.  Sutures removed.  Compression stocking for edema control.  Recheck 4 weeks          This document has been electronically signed by Familia Infante DPM on November 6, 2022 10:58 CST     11/6/2022  10:58 CST

## 2022-11-03 ENCOUNTER — TELEPHONE (OUTPATIENT)
Dept: ENDOCRINOLOGY | Facility: CLINIC | Age: 52
End: 2022-11-03

## 2022-11-03 NOTE — TELEPHONE ENCOUNTER
Patient called and is asking if she can have samples of Dionna Shaffer? Her ins co will not cover it and and maya states that is the only thing working for her. Also requested call back.    Thank you.

## 2022-11-08 DIAGNOSIS — E10.649 TYPE 1 DIABETES MELLITUS WITH HYPOGLYCEMIA UNAWARENESS: ICD-10-CM

## 2022-11-14 ENCOUNTER — OFFICE VISIT (OUTPATIENT)
Dept: ENDOCRINOLOGY | Facility: CLINIC | Age: 52
End: 2022-11-14

## 2022-11-14 ENCOUNTER — SPECIALTY PHARMACY (OUTPATIENT)
Dept: ENDOCRINOLOGY | Facility: CLINIC | Age: 52
End: 2022-11-14

## 2022-11-14 VITALS
SYSTOLIC BLOOD PRESSURE: 130 MMHG | HEART RATE: 116 BPM | DIASTOLIC BLOOD PRESSURE: 50 MMHG | BODY MASS INDEX: 31.92 KG/M2 | WEIGHT: 187 LBS | OXYGEN SATURATION: 98 % | HEIGHT: 64 IN

## 2022-11-14 DIAGNOSIS — I15.2 HYPERTENSION ASSOCIATED WITH DIABETES: ICD-10-CM

## 2022-11-14 DIAGNOSIS — E10.69 MIXED DIABETIC HYPERLIPIDEMIA ASSOCIATED WITH TYPE 1 DIABETES MELLITUS: ICD-10-CM

## 2022-11-14 DIAGNOSIS — E10.649 TYPE 1 DIABETES MELLITUS WITH HYPOGLYCEMIA UNAWARENESS: Primary | ICD-10-CM

## 2022-11-14 DIAGNOSIS — E11.59 HYPERTENSION ASSOCIATED WITH DIABETES: ICD-10-CM

## 2022-11-14 DIAGNOSIS — E10.21 DIABETIC NEPHROPATHY ASSOCIATED WITH TYPE 1 DIABETES MELLITUS: ICD-10-CM

## 2022-11-14 DIAGNOSIS — E78.2 MIXED DIABETIC HYPERLIPIDEMIA ASSOCIATED WITH TYPE 1 DIABETES MELLITUS: ICD-10-CM

## 2022-11-14 LAB — HBA1C MFR BLD: 6.9 %

## 2022-11-14 PROCEDURE — 95251 CONT GLUC MNTR ANALYSIS I&R: CPT | Performed by: INTERNAL MEDICINE

## 2022-11-14 PROCEDURE — 99214 OFFICE O/P EST MOD 30 MIN: CPT | Performed by: INTERNAL MEDICINE

## 2022-11-14 NOTE — PROGRESS NOTES
" Amanda Padron is a 52 y.o. female who presents for  evaluation of   Type 1 diabetes                           Primary Care / Referring Provider  Mindy Serrano APRN    History of Present Illness  Duration/Timing:  Diabetes mellitus type 1, Age at onset of diabetes: 11 years    Severity (Complications/Hospitalizations)  Secondary Macrovascular Complications:  No CAD, No CVA, No PAD  Secondary Microvascular Complications:  Diabetic Nephropathy , Diabetic Retinopathy, No Diabetic Neuropathy      Blood Glucose Readings  both hypo and hyperglycemia      Diet  variable carb intake       Associated Signs/Symptoms  Hyperglycemic Symptoms:  No polyuria, No polydipsia, No polyphagia, Blurred vision  Hypoglycemic Episodes:  Documented symptomatic hypoglycemia, Hypoglycemic unawareness, Seizures and syncope related to hypoglycemia          PE    /50   Pulse 116   Ht 162.6 cm (64\")   Wt 84.8 kg (187 lb)   SpO2 98%   BMI 32.10 kg/m²   AOx3  No visible goiter  Normal Respiratory Effort , Lung CTA  RRR  Diastolic murmur  No Edema    Labs      Lab Results   Component Value Date    WBC 6.50 05/07/2022    HGB 9.9 (L) 05/07/2022    HCT 29.6 (L) 05/07/2022    MCV 86.8 05/07/2022     05/07/2022     Lab Results   Component Value Date    GLUCOSE 210 (H) 10/17/2022    BUN 20 10/17/2022    CREATININE 1.40 (H) 10/17/2022    EGFRIFNONA 45 (L) 10/16/2018    BCR 14.3 10/17/2022    K 4.0 10/17/2022    CO2 23.0 10/17/2022    CALCIUM 9.5 10/17/2022    ALBUMIN 3.80 05/07/2022    AST 18 05/07/2022    ALT 13 05/07/2022                 Assessment & Plan       ICD-10-CM ICD-9-CM   1. 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 Hypertension associated with diabetes (HCC)  E11.59 250.80    I15.2 401.9   3. Mixed diabetic hyperlipidemia associated with type 1 diabetes mellitus (HCC)  E10.69 250.81    E78.2 272.2   4. Diabetic nephropathy associated with type 1 diabetes mellitus (HCC)  E10.21 " 250.41     583.81       Glycemic Mgmt    Lab Results   Component Value Date    HGBA1C 6.9 11/14/2022    HGBA1C 8.8 09/03/2019    HGBA1C 8.3 (H) 10/16/2018     Lab Results   Component Value Date    MICROALBUR 2.8 10/16/2018    CREATININE 1.40 (H) 10/17/2022     Can't use lantus , tresiba or basaglar, profound hypoglycemia and seizures    No carb counting    She gives insulin based on experience and doesn't like change so she is still using syringe and vials    Has been using rashad 2   Type 1 dm w hypoglycemia based on 2 week report       Toujeo 30 = decrease to 26     Humalog   15 with meals but she adjusts between 6 to 8   It seems that she does a carb ratio of 5 and I suggested to try it and self titration explained.  I sugggested sensitivity of 40, she does 75       ready for omnipod 5 , waiting for coverage                                                                                                                                                                                                                                                                                                                                                                                                                                                                                                                                                          Lipid Management    Sept 2021    LDL 63  HDL 68    Lipitor 40 mg tablet, 1 1/2 tabs daily --- decrease to 40       Blood Pressure Management:       Vitals:    11/14/22 0853   BP: 130/50   Pulse: 116   SpO2: 98%           Microvascular Complication Monitoring:       No Diabetic Neuropathy  States takes neurontin for plantar fasciitis ?  Taking 300 mg by Arelis nathan with Dr. Moore in [place] has DR John Subramanian before but insurance changed     On lasix 20 mg qod - changed to 10 mg qod   On lisinopril 10 mg daily  ( from 20 mg qd ) decided by Moris    I will keep  this regimen     Monitor PTH and Red Cell count   If decline in GFR or development of anemia, hyperparathyroidism refer back     Sept 2021 , GFR 41   Sept 2021 alb / creat ratio 13     Immunizations:  Patient prefers not to receive influenza immunization, Patient prefers not to receive pneumococcal immunization  Didn't take COVID vaccine    Preventive Care:  Patient is not smoking    Bone Health    Vit D Def  can't tolerate Vit D3 but can tolerate vit D2    On 50 th u every weeky in the past and levels became high    Off treatment  Vit D from Sept 2021 is 30     Other    neg celiac panel in  2016   Thyroid nl from Sept 2021  B12 elevated    Murmur , asymptomatic, diastolic to my impression , follow clinically           This document has been electronically signed by Satinder Ledezma MD on November 14, 2022 09:29 CST

## 2022-11-30 ENCOUNTER — OFFICE VISIT (OUTPATIENT)
Dept: PODIATRY | Facility: CLINIC | Age: 52
End: 2022-11-30

## 2022-11-30 VITALS — OXYGEN SATURATION: 98 % | BODY MASS INDEX: 31.92 KG/M2 | HEIGHT: 64 IN | HEART RATE: 86 BPM | WEIGHT: 187 LBS

## 2022-11-30 DIAGNOSIS — T84.84XA PAINFUL ORTHOPAEDIC HARDWARE: Primary | ICD-10-CM

## 2022-11-30 PROCEDURE — 99024 POSTOP FOLLOW-UP VISIT: CPT | Performed by: PODIATRIST

## 2022-12-11 DIAGNOSIS — E10.649 TYPE 1 DIABETES MELLITUS WITH HYPOGLYCEMIA UNAWARENESS: ICD-10-CM

## 2022-12-13 DIAGNOSIS — E10.649 TYPE 1 DIABETES MELLITUS WITH HYPOGLYCEMIA UNAWARENESS: ICD-10-CM

## 2023-03-14 ENCOUNTER — TELEMEDICINE (OUTPATIENT)
Dept: ENDOCRINOLOGY | Facility: CLINIC | Age: 53
End: 2023-03-14
Payer: COMMERCIAL

## 2023-03-14 DIAGNOSIS — E55.9 VITAMIN D DEFICIENCY: ICD-10-CM

## 2023-03-14 DIAGNOSIS — I15.2 HYPERTENSION ASSOCIATED WITH DIABETES: ICD-10-CM

## 2023-03-14 DIAGNOSIS — E11.59 HYPERTENSION ASSOCIATED WITH DIABETES: ICD-10-CM

## 2023-03-14 DIAGNOSIS — E78.2 MIXED DIABETIC HYPERLIPIDEMIA ASSOCIATED WITH TYPE 1 DIABETES MELLITUS: ICD-10-CM

## 2023-03-14 DIAGNOSIS — N18.32 CHRONIC RENAL FAILURE, STAGE 3B: ICD-10-CM

## 2023-03-14 DIAGNOSIS — E10.69 MIXED DIABETIC HYPERLIPIDEMIA ASSOCIATED WITH TYPE 1 DIABETES MELLITUS: ICD-10-CM

## 2023-03-14 DIAGNOSIS — E10.21 DIABETIC NEPHROPATHY ASSOCIATED WITH TYPE 1 DIABETES MELLITUS: ICD-10-CM

## 2023-03-14 DIAGNOSIS — E10.649 TYPE 1 DIABETES MELLITUS WITH HYPOGLYCEMIA UNAWARENESS: Primary | ICD-10-CM

## 2023-03-14 PROCEDURE — 99214 OFFICE O/P EST MOD 30 MIN: CPT | Performed by: INTERNAL MEDICINE

## 2023-03-14 RX ORDER — FUROSEMIDE 20 MG/1
20 TABLET ORAL DAILY
Qty: 45 TABLET | Refills: 3 | Status: SHIPPED | OUTPATIENT
Start: 2023-03-14

## 2023-03-14 RX ORDER — HYDRALAZINE HYDROCHLORIDE 50 MG/1
TABLET, FILM COATED ORAL
Qty: 180 TABLET | Refills: 3 | Status: SHIPPED | OUTPATIENT
Start: 2023-03-14

## 2023-03-14 RX ORDER — LISINOPRIL 10 MG/1
10 TABLET ORAL DAILY
Qty: 90 TABLET | Refills: 3 | Status: SHIPPED | OUTPATIENT
Start: 2023-03-14

## 2023-03-14 RX ORDER — INSULIN ASPART 100 [IU]/ML
INJECTION, SOLUTION INTRAVENOUS; SUBCUTANEOUS
Qty: 15 ML | Refills: 11 | Status: SHIPPED | OUTPATIENT
Start: 2023-03-14

## 2023-03-14 NOTE — PROGRESS NOTES
Amanda Padron is a 52 y.o. female who presents for  evaluation of   Type 1 diabetes                                              This was a Telehealth Encounter. Benefits and Disadvantages of a Telehealth Visit were discussed and accepted by patient.  Mode of Visit: Video  Location of patient: Home  You have chosen to receive care through a telehealth visit.  Does the patient consent to use a video/audio connection for your medical care today? Yes  The visit included audio and video interaction. No technical issues occurred during this visit                 Primary Care / Referring Provider  Mindy Serrano APRN    History of Present Illness  Duration/Timing:  Diabetes mellitus type 1, Age at onset of diabetes: 11 years    Severity (Complications/Hospitalizations)  Secondary Macrovascular Complications:  No CAD, No CVA, No PAD  Secondary Microvascular Complications:  Diabetic Nephropathy , Diabetic Retinopathy, No Diabetic Neuropathy      Blood Glucose Readings  both hypo and hyperglycemia      Diet  variable carb intake       Associated Signs/Symptoms  Hyperglycemic Symptoms:  No polyuria, No polydipsia, No polyphagia, Blurred vision  Hypoglycemic Episodes:  Documented symptomatic hypoglycemia, Hypoglycemic unawareness, Seizures and syncope related to hypoglycemia          PE    There were no vitals taken for this visit.  AOx3  No visible goiter  Normal Respiratory Effort , Lung CTA  RRR  Diastolic murmur  No Edema    Labs      Lab Results   Component Value Date    WBC 6.50 05/07/2022    HGB 9.9 (L) 05/07/2022    HCT 29.6 (L) 05/07/2022    MCV 86.8 05/07/2022     05/07/2022     Lab Results   Component Value Date    GLUCOSE 210 (H) 10/17/2022    BUN 20 10/17/2022    CREATININE 1.40 (H) 10/17/2022    EGFRIFNONA 45 (L) 10/16/2018    BCR 14.3 10/17/2022    K 4.0 10/17/2022    CO2 23.0 10/17/2022    CALCIUM 9.5 10/17/2022    ALBUMIN 3.80 05/07/2022    AST 18 05/07/2022    ALT 13 05/07/2022                  Assessment & Plan       ICD-10-CM ICD-9-CM   1. 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 Hypertension associated with diabetes (HCC)  E11.59 250.80    I15.2 401.9   3. Mixed diabetic hyperlipidemia associated with type 1 diabetes mellitus (HCC)  E10.69 250.81    E78.2 272.2   4. Diabetic nephropathy associated with type 1 diabetes mellitus (HCC)  E10.21 250.41     583.81   5. Vitamin D deficiency  E55.9 268.9       Glycemic Mgmt    Lab Results   Component Value Date    HGBA1C 6.9 11/14/2022    HGBA1C 8.8 09/03/2019    HGBA1C 8.3 (H) 10/16/2018     Lab Results   Component Value Date    MICROALBUR 2.8 10/16/2018    CREATININE 1.40 (H) 10/17/2022     Can't use lantus , tresiba or basaglar, profound hypoglycemia and seizures    No carb counting    She gives insulin based on experience and doesn't like change so she is still using syringe and vials    Has been using rashad 2   Last time , Type 1 dm w hypoglycemia    States much less lows   Toujeo 26 to 32  = decrease to 26 and keep it there   toujeo max , 3 pens to  , samples     Novolog  15 with meals but she adjusts between 6 to 8   It seems that she does a carb ratio of 5 and I suggested to try it and self titration explained.  I sugggested sensitivity of 40, she does 75       ready for omnipod 5 , waiting for deductilble , 4 th deductible                                                                                                                                                                                                                                                                                                                                                                                                                                                                                                                                                         Lipid Management  Lab Results   Component  Value Date    CHOL 154 10/16/2018    CHLPL 132 01/09/2017    TRIG 96 10/16/2018    HDL 65 10/16/2018    LDL 60 10/16/2018       Sept 2021    LDL 63  HDL 68    Lipitor 40 mg tablet every night       Blood Pressure Management:       There were no vitals filed for this visit.    Lisinopril 10 mg daily   Hydralazine 50 mg bid   Lasix 10 mg qod, states if not , she swells   Microvascular Complication Monitoring:       No Diabetic Neuropathy  States takes neurontin for plantar fasciitis ?  Taking 300 mg by Arelis      following with Dr. Moore in [place] has DR    John Subramanian before but insurance changed     On lasix 20 mg qod - changed to 10 mg qod   On lisinopril 10 mg daily  ( from 20 mg qd ) decided by Moris    I will keep this regimen     Monitor PTH and Red Cell count   If decline in GFR or development of anemia, hyperparathyroidism refer back     Sept 2021 , GFR 41   Sept 2021 alb / creat ratio 13     Immunizations:  Patient prefers not to receive influenza immunization, Patient prefers not to receive pneumococcal immunization  Didn't take COVID vaccine    Preventive Care:  Patient is not smoking    Bone Health    Vit D Def  can't tolerate Vit D3 but can tolerate vit D2    On 50 th u every weeky in the past and levels became high    Off treatment  Vit D from Sept 2021 is 30     Other    neg celiac panel in  2016   Thyroid nl from Sept 2021  B12 elevated    Murmur , asymptomatic, diastolic to my impression , follow clinically     New Medications Ordered This Visit   Medications   • lisinopril (PRINIVIL,ZESTRIL) 10 MG tablet     Sig: Take 1 tablet by mouth Daily.     Dispense:  90 tablet     Refill:  3   • hydrALAZINE (APRESOLINE) 50 MG tablet     Sig: One tab po bid     Dispense:  180 tablet     Refill:  3   • furosemide (LASIX) 20 MG tablet     Sig: Take 1 tablet by mouth Daily. 1 tab every other day     Dispense:  45 tablet     Refill:  3   • insulin aspart (NovoLOG FlexPen) 100 UNIT/ML solution  pen-injector sc pen     Si units TID     Dispense:  15 mL     Refill:  11       Orders Placed This Encounter   Procedures   • CBC Auto Differential   • Comprehensive Metabolic Panel   • Hemoglobin A1c   • Lipid Panel   • Microalbumin / Creatinine Urine Ratio - Urine, Clean Catch   • TSH   • Vitamin B12             This document has been electronically signed by Satinder Ledezma MD on 2023 09:49 CDT

## 2023-04-14 DIAGNOSIS — E10.649 TYPE 1 DIABETES MELLITUS WITH HYPOGLYCEMIA UNAWARENESS: ICD-10-CM

## 2023-06-05 ENCOUNTER — TELEPHONE (OUTPATIENT)
Dept: ENDOCRINOLOGY | Facility: CLINIC | Age: 53
End: 2023-06-05
Payer: COMMERCIAL

## 2023-06-06 DIAGNOSIS — E10.649 TYPE 1 DIABETES MELLITUS WITH HYPOGLYCEMIA UNAWARENESS: ICD-10-CM

## 2023-08-23 DIAGNOSIS — E10.649 TYPE 1 DIABETES MELLITUS WITH HYPOGLYCEMIA UNAWARENESS: ICD-10-CM

## 2023-08-31 ENCOUNTER — TELEPHONE (OUTPATIENT)
Dept: ENDOCRINOLOGY | Facility: CLINIC | Age: 53
End: 2023-08-31
Payer: COMMERCIAL

## 2023-08-31 NOTE — TELEPHONE ENCOUNTER
Ms. Amanda Padron called and is needing more of the Insulin Glargine, 2 Unit Dial, (Toujeo Max SoloStar) 300 UNIT/ML solution pen-injector injection samples if Dr. Ledezma has any.    Pt call back [phone]    Has an appt in    If he has any samples please call and let her know so they can come and .

## 2023-09-20 ENCOUNTER — TELEPHONE (OUTPATIENT)
Dept: ENDOCRINOLOGY | Facility: CLINIC | Age: 53
End: 2023-09-20
Payer: COMMERCIAL

## 2023-09-20 NOTE — TELEPHONE ENCOUNTER
Pt called stating she has 2 days left of Toujeo and and wants to know if we have any samples.    Please advise    Thank you

## 2023-09-22 ENCOUNTER — TELEPHONE (OUTPATIENT)
Dept: ENDOCRINOLOGY | Facility: CLINIC | Age: 53
End: 2023-09-22

## 2023-09-22 RX ORDER — INSULIN GLARGINE 300 U/ML
32 INJECTION, SOLUTION SUBCUTANEOUS
Qty: 10 ML | Refills: 11 | Status: SHIPPED | OUTPATIENT
Start: 2023-09-22

## 2023-09-22 NOTE — TELEPHONE ENCOUNTER
Patient left a voicemail requesting a call back from Dr James's nurse regarding her insulin. This is all the information she left.  Please call patient

## 2023-09-25 ENCOUNTER — DOCUMENTATION (OUTPATIENT)
Dept: ENDOCRINOLOGY | Facility: CLINIC | Age: 53
End: 2023-09-25

## 2023-09-25 ENCOUNTER — TELEPHONE (OUTPATIENT)
Dept: ENDOCRINOLOGY | Facility: CLINIC | Age: 53
End: 2023-09-25
Payer: COMMERCIAL

## 2023-09-25 NOTE — TELEPHONE ENCOUNTER
Patient left a VM stating that her Toujeo Max Solostar is needing a PA. Pharmacy will send over a PA form. Please advise.     Thank you

## 2024-11-12 NOTE — TELEPHONE ENCOUNTER
Please see the attached refill request.   Pt called to see if she can get any samples of Toujeo max.    Please advise.    Thanks

## (undated) DEVICE — GLV SURG ORTHO BIOGEL OPTIFIT PF LTX SZ8

## (undated) DEVICE — PATIENT RETURN ELECTRODE, SINGLE-USE, CONTACT QUALITY MONITORING, ADULT, WITH 9FT CORD, FOR PATIENTS WEIGING OVER 33LBS. (15KG): Brand: MEGADYNE

## (undated) DEVICE — CVR FLUOROSCOPE C/ARM W/TP 36X28IN

## (undated) DEVICE — IMPLANTABLE DEVICE
Type: IMPLANTABLE DEVICE | Site: FOOT | Status: NON-FUNCTIONAL
Removed: 2022-05-12

## (undated) DEVICE — COVER,MAYO STAND,STERILE: Brand: MEDLINE

## (undated) DEVICE — SCRW CORT LP SS 3.5X16MM
Type: IMPLANTABLE DEVICE | Site: FOOT | Status: NON-FUNCTIONAL
Removed: 2022-05-12

## (undated) DEVICE — GLV SURG TRIUMPH PF LTX 7 STRL

## (undated) DEVICE — SPNG GZ WOVN 4X4IN 12PLY 10/BX STRL

## (undated) DEVICE — PK POD 60

## (undated) DEVICE — TBG PENCL TELESCP MEGADYNE SMOKE EVAC 10FT

## (undated) DEVICE — UNDRPD BREATH 23X36 BG/10

## (undated) DEVICE — BNDG ELAS CO-FLEX SLF ADHR 3IN5YD LF2 STRL

## (undated) DEVICE — PRECISION THIN (9.0 X 0.38 X 31.0MM)

## (undated) DEVICE — IMPLANTABLE DEVICE
Type: IMPLANTABLE DEVICE | Site: FOOT | Status: NON-FUNCTIONAL
Brand: MICROAIRE®
Removed: 2022-05-12

## (undated) DEVICE — DRSNG WND GZ CURAD OIL EMULSION 3X3IN STRL

## (undated) DEVICE — GLV SURG TRIUMPH ORTHO W/ALOE PF LTX 7.5 STRL

## (undated) DEVICE — BNDG ELAS ELITE V/CLOSE 4IN 5YD LF STRL

## (undated) DEVICE — SOL IRR NACL 0.9PCT BT 1000ML

## (undated) DEVICE — DRAPE,EXTREMITY,89X128,STERILE: Brand: MEDLINE

## (undated) DEVICE — SUT ETHLN 3/0 FS1 663G

## (undated) DEVICE — GOWN,AURORA,NOREINF,RAGLAN,XL,STERILE: Brand: MEDLINE

## (undated) DEVICE — GOWN,PREVENTION PLUS,XLNG/XXLARGE,STRL: Brand: MEDLINE

## (undated) DEVICE — CONTAINER,SPECIMEN,OR STERILE,4OZ: Brand: MEDLINE

## (undated) DEVICE — PAD UNDERCAST WYTEX 4IN 4YD LF STRL

## (undated) DEVICE — SYRINGE, LUER LOCK, 10ML: Brand: MEDLINE

## (undated) DEVICE — GLV SURG SENSICARE PI ORTHO SZ6.5 LF STRL

## (undated) DEVICE — GLV SURG SENSICARE PI ORTHO PF SZ7 LF STRL

## (undated) DEVICE — STERILE POLYISOPRENE POWDER-FREE SURGICAL GLOVES WITH EMOLLIENT COATING: Brand: PROTEXIS

## (undated) DEVICE — SPNG LAP 18X18IN LF STRL PK/5

## (undated) DEVICE — NDL HYPO ECLPS SFTY 25G 1 1/2IN

## (undated) DEVICE — POSTN ELEV LEG PROCARE FM UNIV 45DEG 31.5X10IN

## (undated) DEVICE — GLV SURG SENSICARE PI ORTHO SZ7.5 LF STRL

## (undated) DEVICE — Device

## (undated) DEVICE — BANDAGE,GAUZE,BULKEE II,4.5"X4.1YD,STRL: Brand: MEDLINE

## (undated) DEVICE — GLV SURG SENSICARE POLYISPRN W/ALOE PF LF 6.5 GRN STRL

## (undated) DEVICE — BIT DRL TRIMIT 2.5MM

## (undated) DEVICE — 1010 S-DRAPE TOWEL DRAPE 10/BX: Brand: STERI-DRAPE™

## (undated) DEVICE — PROXIMATE SKIN STAPLERS (35 WIDE) CONTAINS 35 STAINLESS STEEL STAPLES (FIXED HEAD): Brand: PROXIMATE

## (undated) DEVICE — SHEET,DRAPE,53X77,STERILE: Brand: MEDLINE

## (undated) DEVICE — SCRW CORT LP SS 3.5X20MM
Type: IMPLANTABLE DEVICE | Site: FOOT | Status: NON-FUNCTIONAL
Removed: 2022-05-12